# Patient Record
Sex: FEMALE | Race: WHITE | NOT HISPANIC OR LATINO | Employment: UNEMPLOYED | URBAN - METROPOLITAN AREA
[De-identification: names, ages, dates, MRNs, and addresses within clinical notes are randomized per-mention and may not be internally consistent; named-entity substitution may affect disease eponyms.]

---

## 2022-04-11 ENCOUNTER — HISTORICAL (OUTPATIENT)
Dept: ADMINISTRATIVE | Facility: HOSPITAL | Age: 62
End: 2022-04-11
Payer: COMMERCIAL

## 2022-04-27 VITALS
DIASTOLIC BLOOD PRESSURE: 72 MMHG | BODY MASS INDEX: 37.65 KG/M2 | WEIGHT: 212.5 LBS | SYSTOLIC BLOOD PRESSURE: 110 MMHG | OXYGEN SATURATION: 98 % | HEIGHT: 63 IN

## 2022-06-17 ENCOUNTER — CLINICAL SUPPORT (OUTPATIENT)
Dept: INTERNAL MEDICINE | Facility: CLINIC | Age: 62
End: 2022-06-17
Payer: COMMERCIAL

## 2022-06-17 VITALS — TEMPERATURE: 98 F

## 2022-06-17 DIAGNOSIS — Z23 VACCINE FOR VZV (VARICELLA-ZOSTER VIRUS): Primary | ICD-10-CM

## 2022-06-17 DIAGNOSIS — Z23 NEED FOR VACCINATION: Primary | ICD-10-CM

## 2022-06-17 PROCEDURE — 99211 OFF/OP EST MAY X REQ PHY/QHP: CPT | Mod: PBBFAC

## 2022-06-17 PROCEDURE — 90750 HZV VACC RECOMBINANT IM: CPT | Mod: PBBFAC

## 2022-06-17 NOTE — PROGRESS NOTES
[place] [facility] [place] Shingrix Vaccine  Shingrix  Powder(NDC 20767-762-15,  Lot # [id number], Expires 08/31/2023. MFTD by: Unocoin)  reconstituted with one vial of adujvant suspension component .  NDC 34949-808-21, Lot # [id number], expires : 08/31/2023 . 0.5 ml administered IM to left deltoid using aseptic technique. Patient reviewed and give copy of VIS statement. Patient tolerated procedure well with no adverse reactions noted. Patient will take second dose of vaccine at next apointment with provider . (09/ 23/2022 at 0715 am.)

## 2022-09-23 ENCOUNTER — OFFICE VISIT (OUTPATIENT)
Dept: INTERNAL MEDICINE | Facility: CLINIC | Age: 62
End: 2022-09-23
Payer: COMMERCIAL

## 2022-09-23 VITALS
WEIGHT: 201 LBS | DIASTOLIC BLOOD PRESSURE: 80 MMHG | RESPIRATION RATE: 20 BRPM | TEMPERATURE: 98 F | SYSTOLIC BLOOD PRESSURE: 120 MMHG | HEIGHT: 63 IN | HEART RATE: 80 BPM | BODY MASS INDEX: 35.61 KG/M2

## 2022-09-23 DIAGNOSIS — L40.50 PSORIATIC ARTHRITIS: ICD-10-CM

## 2022-09-23 DIAGNOSIS — E78.2 MIXED HYPERLIPIDEMIA: ICD-10-CM

## 2022-09-23 DIAGNOSIS — Z23 NEED FOR VACCINATION: Primary | ICD-10-CM

## 2022-09-23 DIAGNOSIS — Z00.00 WELLNESS EXAMINATION: ICD-10-CM

## 2022-09-23 DIAGNOSIS — Z12.39 ENCOUNTER FOR SCREENING FOR MALIGNANT NEOPLASM OF BREAST, UNSPECIFIED SCREENING MODALITY: ICD-10-CM

## 2022-09-23 PROBLEM — F17.200 TOBACCO DEPENDENCE: Status: ACTIVE | Noted: 2022-09-23

## 2022-09-23 PROCEDURE — 3008F PR BODY MASS INDEX (BMI) DOCUMENTED: ICD-10-PCS | Mod: CPTII,S$PBB,, | Performed by: NURSE PRACTITIONER

## 2022-09-23 PROCEDURE — 3074F PR MOST RECENT SYSTOLIC BLOOD PRESSURE < 130 MM HG: ICD-10-PCS | Mod: CPTII,S$PBB,, | Performed by: NURSE PRACTITIONER

## 2022-09-23 PROCEDURE — 3074F SYST BP LT 130 MM HG: CPT | Mod: CPTII,S$PBB,, | Performed by: NURSE PRACTITIONER

## 2022-09-23 PROCEDURE — 1160F PR REVIEW ALL MEDS BY PRESCRIBER/CLIN PHARMACIST DOCUMENTED: ICD-10-PCS | Mod: CPTII,S$PBB,, | Performed by: NURSE PRACTITIONER

## 2022-09-23 PROCEDURE — 90471 IMMUNIZATION ADMIN: CPT | Mod: PBBFAC | Performed by: NURSE PRACTITIONER

## 2022-09-23 PROCEDURE — 1159F PR MEDICATION LIST DOCUMENTED IN MEDICAL RECORD: ICD-10-PCS | Mod: CPTII,S$PBB,, | Performed by: NURSE PRACTITIONER

## 2022-09-23 PROCEDURE — 3079F PR MOST RECENT DIASTOLIC BLOOD PRESSURE 80-89 MM HG: ICD-10-PCS | Mod: CPTII,S$PBB,, | Performed by: NURSE PRACTITIONER

## 2022-09-23 PROCEDURE — 90686 IIV4 VACC NO PRSV 0.5 ML IM: CPT | Mod: PBBFAC | Performed by: NURSE PRACTITIONER

## 2022-09-23 PROCEDURE — 99213 PR OFFICE/OUTPT VISIT, EST, LEVL III, 20-29 MIN: ICD-10-PCS | Mod: S$PBB,,, | Performed by: NURSE PRACTITIONER

## 2022-09-23 PROCEDURE — 3079F DIAST BP 80-89 MM HG: CPT | Mod: CPTII,S$PBB,, | Performed by: NURSE PRACTITIONER

## 2022-09-23 PROCEDURE — 1160F RVW MEDS BY RX/DR IN RCRD: CPT | Mod: CPTII,S$PBB,, | Performed by: NURSE PRACTITIONER

## 2022-09-23 PROCEDURE — 3008F BODY MASS INDEX DOCD: CPT | Mod: CPTII,S$PBB,, | Performed by: NURSE PRACTITIONER

## 2022-09-23 PROCEDURE — 1159F MED LIST DOCD IN RCRD: CPT | Mod: CPTII,S$PBB,, | Performed by: NURSE PRACTITIONER

## 2022-09-23 PROCEDURE — 90472 IMMUNIZATION ADMIN EACH ADD: CPT | Mod: PBBFAC | Performed by: NURSE PRACTITIONER

## 2022-09-23 PROCEDURE — 90472 IMMUNIZATION ADMIN EACH ADD: CPT | Mod: PBBFAC

## 2022-09-23 PROCEDURE — 99215 OFFICE O/P EST HI 40 MIN: CPT | Mod: PBBFAC | Performed by: NURSE PRACTITIONER

## 2022-09-23 PROCEDURE — 99213 OFFICE O/P EST LOW 20 MIN: CPT | Mod: S$PBB,,, | Performed by: NURSE PRACTITIONER

## 2022-09-23 PROCEDURE — 90686 IIV4 VACC NO PRSV 0.5 ML IM: CPT | Mod: PBBFAC

## 2022-09-23 PROCEDURE — 90750 HZV VACC RECOMBINANT IM: CPT | Mod: PBBFAC | Performed by: NURSE PRACTITIONER

## 2022-09-23 RX ORDER — OMEGA-3-ACID ETHYL ESTERS 1 G/1
2 CAPSULE, LIQUID FILLED ORAL 2 TIMES DAILY
COMMUNITY

## 2022-09-23 NOTE — PROGRESS NOTES
ANGI Bruce   [facility]  [address] [facility] [address]      PATIENT NAME: Alma Rosa Disla  : 1960  DATE: 22  MRN: [id number]      Billing Provider: ANGI Bruce  Level of Service: VA OFFICE/OUTPT VISIT, EST, LEVL III, 20-29 MIN  Patient PCP Information       Provider PCP Type    ANGI Bruce General            Reason for Visit / Chief Complaint: Hyperlipidemia (Fasting, right elbow/knee pain, wants flu and 2nd shingrix )       History of Present Illness / Problem Focused Workflow     Alma Rosa Disla is a 62 y.o. White female presents to the clinic for HLD f/u. PMH obesity, and psoriatic arthritis (formerly seeing Dr. Baum). Followed by Dr. Feli Aleman, gyn. Continues to follow dash diet. Reports generalized joint and muscle pain daily with flare right elbow and right knee; stiffness lasting  mins. Used to have problems mostly during winter months but attributes current joint pain on age and weight. Previously seeing Rheumatology, stated was on humira at last visit; today reports was on remicade in the past with significant improvement. Last rheum visit approx 15 yrs. States has been drinking Almased powder in almond milk with some improvement. Had used about 7 yrs ago and was down to 140 lbs. Amenable to workup and referral today. Amendable to flu and shingrix #2 today. Reports taking female comfort (15 yrs) and thyroid support (2 yrs) supplements OTC. Denies chest pain, shortness of breath, cough, fever, headache, dizziness, weakness, abdominal pain, nausea, vomiting, diarrhea, constipation, dysuria, depression, anxiety, SI/HI.    Review of Systems     Review of Systems   Constitutional:  Negative for activity change and unexpected weight change.   HENT:  Negative for hearing loss, rhinorrhea and trouble swallowing.    Eyes:  Negative for discharge and visual disturbance.  "  Respiratory:  Negative for chest tightness and wheezing.    Cardiovascular:  Negative for chest pain and palpitations.   Gastrointestinal:  Negative for blood in stool, constipation, diarrhea and vomiting.   Endocrine: Negative for polydipsia and polyuria.   Genitourinary:  Negative for difficulty urinating, dysuria, hematuria and menstrual problem.   Musculoskeletal:  Positive for arthralgias and joint swelling. Negative for neck pain.   Neurological:  Negative for weakness and headaches.   Psychiatric/Behavioral:  Negative for dysphoric mood.       Medical / Social / Family History     Past Medical History:   Diagnosis Date    Hyperlipidemia        Past Surgical History:   Procedure Laterality Date    ADENOIDECTOMY      CHOLECYSTECTOMY      COLONOSCOPY      with biopsy    TONSILLECTOMY         Social History  Ms. Disla reports that she has never smoked. She has never used smokeless tobacco. She reports current alcohol use. She reports that she does not use drugs.    Family History  's family history includes Alzheimer's disease in her mother; Cancer in her father and sister; Diabetes in her brother and mother; Epilepsy in her brother; Heart disease in her mother.    Medications and Allergies     Medications  Medication List with Changes/Refills   Current Medications    CHOLECALCIFEROL, VITAMIN D3, 325 MCG (13,000 UNIT) CAP CAPSULE    Take by mouth 2 (two) times a day.    MULTIVITAMIN CAPSULE    Take 1 capsule by mouth once daily.    OMEGA-3 ACID ETHYL ESTERS (LOVAZA) 1 GRAM CAPSULE    Take 2 g by mouth 2 (two) times daily.         Allergies  Review of patient's allergies indicates:  No Known Allergies    Physical Examination   Vital Signs  Temp: 98.1 °F (36.7 °C)  Temp src: Oral  Pulse: 80  Resp: 20  BP: 120/80  BP Location: Left arm  Patient Position: Sitting  Pain Score:   4  Pain Loc: Elbow  Height and Weight  Height: 5' 3.39" (161 cm)  Weight: 91.2 kg (201 lb)  BSA (Calculated - sq m): 2.02 sq " meters  BMI (Calculated): 35.2  Weight in (lb) to have BMI = 25: 142.6]   Physical Exam  Vitals reviewed.   Constitutional:       Appearance: Normal appearance. She is obese.   HENT:      Head: Normocephalic.   Eyes:      Pupils: Pupils are equal, round, and reactive to light.   Cardiovascular:      Rate and Rhythm: Normal rate and regular rhythm.      Heart sounds: Normal heart sounds.   Pulmonary:      Effort: Pulmonary effort is normal.      Breath sounds: Normal breath sounds.   Abdominal:      General: Bowel sounds are normal.      Palpations: Abdomen is soft.   Musculoskeletal:      Right elbow: Decreased range of motion. Tenderness present in lateral epicondyle.      Right knee: Crepitus present. Decreased range of motion. Tenderness present over the patellar tendon.   Skin:     General: Skin is warm.   Neurological:      Mental Status: She is alert and oriented to person, place, and time.   Psychiatric:         Mood and Affect: Mood normal.         Speech: Speech normal.         Behavior: Behavior is cooperative.         Judgment: Judgment normal.         Results     Most recent labs on 3/25/22 in [facility] reviewed.    Assessment and Plan (including Health Maintenance)     Plan: Virtual visit in 4-6 weeks to discuss lab results.         Health Maintenance Due   Topic Date Due    Hepatitis C Screening  Never done    HIV Screening  Never done    TETANUS VACCINE  Never done    Mammogram  09/28/2021    COVID-19 Vaccine (3 - Booster for Pfizer series) 10/02/2021       Problem List Items Addressed This Visit          Cardiac/Vascular    Mixed hyperlipidemia    Current Assessment & Plan     FLP ordered.  Follow a low cholesterol, low saturated fat diet with less than 200 mg of cholesterol a day.   Avoid fried foods and high saturated fats (cameron, sausage, cookies, cakes, chips, cheese, whole milk, butter, mayonnaise, creamy dressings, gravy and cream sauces).  Add flax seed or fish oil supplements to diet.   Increase  dietary fiber.   Regular exercise improves cholesterol levels.  Physical activity 5 times a week for 30 minutes per day (or 150 minutes per week).   Stressed importance of dietary modifications.              ID    Need for vaccination - Primary    Current Assessment & Plan     Flu and shingles vaccines given.         Relevant Orders    Influenza - Quadrivalent (PF) (Completed)    Zoster Recombinant Vaccine (Completed)       Endocrine    BMI 35.0-35.9,adult    Current Assessment & Plan     BMI 35. Goal BMI <30.  Aerobic exercise 150 minutes per week.  Avoid soda, simple sugars, sweets, excessive rice, pasta, potatoes or bread.   Choose brown options when available and portion control.  Limit fast foods and fried foods.   Choose complex carbs in moderation (ex: green, leafy vegetables, beans, oatmeal).  Eat plenty of fresh fruits and vegetables with lean meats daily.   Consider permanent healthy lifestyle changes.              Orthopedic    Psoriatic arthritis    Current Assessment & Plan     Autoimmune workup ordered.  Will refer to rheumatology if indicated.          Relevant Orders    C-Reactive Protein (Completed)    Sedimentation rate (Completed)    RHEUMATOID ARTHRITIS PANEL    Anti-Smith Antibody    Uric Acid (Completed)     Other Visit Diagnoses       Encounter for screening for malignant neoplasm of breast, unspecified screening modality        Relevant Orders    Mammo Digital Screening Bilat    Wellness examination        Relevant Orders    Ambulatory referral/consult to Gynecology            Health Maintenance Topics with due status: Not Due       Topic Last Completion Date    Colorectal Cancer Screening 04/13/2018    Cervical Cancer Screening 07/29/2020    Lipid Panel 07/31/2020       Future Appointments   Date Time Provider Department Center   10/26/2022  1:15 PM ANGI Banda [facility]une[facility]            Signature:  ANGI Bruce  [facility]  [address] [facility] [address]    Date of encounter: 9/23/22    Answers submitted by the patient for this visit:  Review of Systems Questionnaire (Submitted on 9/16/2022)  activity change: No  unexpected weight change: No  neck pain: No  hearing loss: No  rhinorrhea: No  trouble swallowing: No  eye discharge: No  visual disturbance: No  chest tightness: No  wheezing: No  chest pain: No  palpitations: No  blood in stool: No  constipation: No  vomiting: No  diarrhea: No  polydipsia: No  polyuria: No  difficulty urinating: No  hematuria: No  menstrual problem: No  dysuria: No  joint swelling: Yes  arthralgias: Yes  headaches: No  weakness: No  dysphoric mood: No

## 2022-09-26 ENCOUNTER — LAB VISIT (OUTPATIENT)
Dept: LAB | Facility: HOSPITAL | Age: 62
End: 2022-09-26
Attending: NURSE PRACTITIONER
Payer: COMMERCIAL

## 2022-09-26 DIAGNOSIS — L40.50 PSORIATIC ARTHRITIS: ICD-10-CM

## 2022-09-26 LAB
CRP SERPL-MCNC: 12.8 MG/L
ERYTHROCYTE [SEDIMENTATION RATE] IN BLOOD: 49 MM/HR (ref 0–20)
URATE SERPL-MCNC: 5.6 MG/DL (ref 2.6–6)

## 2022-09-26 PROCEDURE — 86140 C-REACTIVE PROTEIN: CPT

## 2022-09-26 PROCEDURE — 86235 NUCLEAR ANTIGEN ANTIBODY: CPT

## 2022-09-26 PROCEDURE — 84550 ASSAY OF BLOOD/URIC ACID: CPT

## 2022-09-26 PROCEDURE — 36415 COLL VENOUS BLD VENIPUNCTURE: CPT

## 2022-09-26 PROCEDURE — 85651 RBC SED RATE NONAUTOMATED: CPT

## 2022-09-26 PROCEDURE — 86431 RHEUMATOID FACTOR QUANT: CPT

## 2022-09-26 PROCEDURE — 86200 CCP ANTIBODY: CPT

## 2022-09-27 LAB — ENA SM IGG SER-ACNC: <0.2 U

## 2022-09-27 NOTE — ASSESSMENT & PLAN NOTE
FLP ordered.  Follow a low cholesterol, low saturated fat diet with less than 200 mg of cholesterol a day.   Avoid fried foods and high saturated fats (cameron, sausage, cookies, cakes, chips, cheese, whole milk, butter, mayonnaise, creamy dressings, gravy and cream sauces).  Add flax seed or fish oil supplements to diet.   Increase dietary fiber.   Regular exercise improves cholesterol levels.  Physical activity 5 times a week for 30 minutes per day (or 150 minutes per week).   Stressed importance of dietary modifications.

## 2022-09-27 NOTE — ASSESSMENT & PLAN NOTE
BMI 35. Goal BMI <30.  Aerobic exercise 150 minutes per week.  Avoid soda, simple sugars, sweets, excessive rice, pasta, potatoes or bread.   Choose brown options when available and portion control.  Limit fast foods and fried foods.   Choose complex carbs in moderation (ex: green, leafy vegetables, beans, oatmeal).  Eat plenty of fresh fruits and vegetables with lean meats daily.   Consider permanent healthy lifestyle changes.

## 2022-09-29 LAB
CYCLIC CITRULLINATED PEPTIDE (CCP) (OLG): NEGATIVE
RHEUMATOID FACTOR IGA (OLG): NEGATIVE
RHEUMATOID FACTOR IGM (OLG): NEGATIVE

## 2022-11-06 DIAGNOSIS — N64.89 BREAST ASYMMETRY: Primary | ICD-10-CM

## 2022-11-07 NOTE — PROGRESS NOTES
Orders placed for diagnostic left MMG and left breast US per recommendations. Please fax to [facility] for scheduling. Thanks

## 2022-12-07 LAB — BCS RECOMMENDATION EXT: NORMAL

## 2023-01-31 ENCOUNTER — DOCUMENTATION ONLY (OUTPATIENT)
Dept: INTERNAL MEDICINE | Facility: CLINIC | Age: 63
End: 2023-01-31
Payer: COMMERCIAL

## 2023-07-13 ENCOUNTER — OFFICE VISIT (OUTPATIENT)
Dept: URGENT CARE | Facility: CLINIC | Age: 63
End: 2023-07-13
Payer: COMMERCIAL

## 2023-07-13 VITALS
RESPIRATION RATE: 20 BRPM | BODY MASS INDEX: 35.79 KG/M2 | HEART RATE: 85 BPM | DIASTOLIC BLOOD PRESSURE: 82 MMHG | HEIGHT: 63 IN | OXYGEN SATURATION: 98 % | SYSTOLIC BLOOD PRESSURE: 138 MMHG | TEMPERATURE: 98 F | WEIGHT: 202 LBS

## 2023-07-13 DIAGNOSIS — R39.9 SYMPTOMS OF URINARY TRACT INFECTION: Primary | ICD-10-CM

## 2023-07-13 DIAGNOSIS — N39.0 URINARY TRACT INFECTION WITH HEMATURIA, SITE UNSPECIFIED: ICD-10-CM

## 2023-07-13 DIAGNOSIS — R31.9 URINARY TRACT INFECTION WITH HEMATURIA, SITE UNSPECIFIED: ICD-10-CM

## 2023-07-13 LAB
APPEARANCE UR: CLEAR
BACTERIA #/AREA URNS AUTO: ABNORMAL /HPF
BILIRUB UR QL STRIP.AUTO: NEGATIVE
BILIRUB UR QL STRIP: NEGATIVE
COLOR UR: YELLOW
GLUCOSE UR QL STRIP.AUTO: NORMAL
GLUCOSE UR QL STRIP: NEGATIVE
HYALINE CASTS #/AREA URNS LPF: ABNORMAL /LPF
KETONES UR QL STRIP.AUTO: NEGATIVE
KETONES UR QL STRIP: NEGATIVE
LEUKOCYTE ESTERASE UR QL STRIP.AUTO: 250
LEUKOCYTE ESTERASE UR QL STRIP: POSITIVE
MUCOUS THREADS URNS QL MICRO: ABNORMAL /LPF
NITRITE UR QL STRIP.AUTO: NEGATIVE
PH UR STRIP.AUTO: 5.5 [PH]
PH, POC UA: 6
POC BLOOD, URINE: POSITIVE
POC NITRATES, URINE: NEGATIVE
PROT UR QL STRIP.AUTO: NEGATIVE
PROT UR QL STRIP: NEGATIVE
RBC #/AREA URNS AUTO: ABNORMAL /HPF
RBC UR QL AUTO: NEGATIVE
SP GR UR STRIP.AUTO: 1.01
SP GR UR STRIP: 1.01 (ref 1–1.03)
SQUAMOUS #/AREA URNS LPF: ABNORMAL /HPF
UROBILINOGEN UR STRIP-ACNC: 0.2 (ref 0.1–1.1)
UROBILINOGEN UR STRIP-ACNC: NORMAL
WBC #/AREA URNS AUTO: ABNORMAL /HPF

## 2023-07-13 PROCEDURE — 99214 OFFICE O/P EST MOD 30 MIN: CPT | Mod: PBBFAC | Performed by: FAMILY MEDICINE

## 2023-07-13 PROCEDURE — 99214 OFFICE O/P EST MOD 30 MIN: CPT | Mod: S$PBB,,, | Performed by: FAMILY MEDICINE

## 2023-07-13 PROCEDURE — 99214 PR OFFICE/OUTPT VISIT, EST, LEVL IV, 30-39 MIN: ICD-10-PCS | Mod: S$PBB,,, | Performed by: FAMILY MEDICINE

## 2023-07-13 PROCEDURE — 87088 URINE BACTERIA CULTURE: CPT | Performed by: FAMILY MEDICINE

## 2023-07-13 PROCEDURE — 81001 URINALYSIS AUTO W/SCOPE: CPT | Performed by: FAMILY MEDICINE

## 2023-07-13 PROCEDURE — 81003 URINALYSIS AUTO W/O SCOPE: CPT | Mod: PBBFAC,91 | Performed by: FAMILY MEDICINE

## 2023-07-13 RX ORDER — NITROFURANTOIN 25; 75 MG/1; MG/1
100 CAPSULE ORAL 2 TIMES DAILY
Qty: 14 CAPSULE | Refills: 0 | Status: SHIPPED | OUTPATIENT
Start: 2023-07-13 | End: 2023-07-24 | Stop reason: ALTCHOICE

## 2023-07-13 NOTE — PROGRESS NOTES
"Subjective:       Patient ID: Alma Rosa Disla is a 63 y.o. female.    Chief Complaint: Urinary Tract Infection (Symptoms x 1 week. Flank pain, dysuria and urgency)      Urinary Tract Infection     63-year-old female with 1 week of flank pain, dysuria, and increased urinary urgency.  Over-the-counter medications have been minimally effective.  Review of Systems   Genitourinary:         As above       Objective:       Vital Signs  Temp: 98.1 °F (36.7 °C)  Temp Source: Oral  Pulse: 85  Resp: 20  SpO2: 98 %  BP: 138/82  Pain Score:   7  Pain Loc: Back  Height and Weight  Height: 5' 3" (160 cm)  Weight: 91.6 kg (202 lb)  BSA (Calculated - sq m): 2.02 sq meters  BMI (Calculated): 35.8  Weight in (lb) to have BMI = 25: 140.8]  Physical Exam  Constitutional:       Appearance: Normal appearance.   HENT:      Head: Normocephalic and atraumatic.   Eyes:      Extraocular Movements: Extraocular movements intact.      Conjunctiva/sclera: Conjunctivae normal.   Cardiovascular:      Rate and Rhythm: Normal rate and regular rhythm.      Heart sounds: Normal heart sounds.   Pulmonary:      Breath sounds: Normal breath sounds.   Skin:     General: Skin is warm and dry.   Neurological:      General: No focal deficit present.      Mental Status: She is alert.   Psychiatric:         Mood and Affect: Mood and affect normal.         Speech: Speech normal.         Behavior: Behavior normal. Behavior is cooperative.         Thought Content: Thought content does not include homicidal or suicidal ideation.       Assessment:       Problem List Items Addressed This Visit    None  Visit Diagnoses       Symptoms of urinary tract infection    -  Primary    Relevant Orders    POCT Urinalysis, Dipstick, Automated, W/O Scope (Completed)    Urinary tract infection with hematuria, site unspecified        Relevant Medications    nitrofurantoin, macrocrystal-monohydrate, (MACROBID) 100 MG capsule    Other Relevant Orders    Urinalysis, Reflex to " Urine Culture            Plan:    Urine culture pending  Encouraged increased fluid intake  ER precautions  FU with PCP

## 2023-07-16 LAB — BACTERIA UR CULT: NORMAL

## 2023-07-24 ENCOUNTER — OFFICE VISIT (OUTPATIENT)
Dept: INTERNAL MEDICINE | Facility: CLINIC | Age: 63
End: 2023-07-24
Payer: COMMERCIAL

## 2023-07-24 VITALS
RESPIRATION RATE: 18 BRPM | BODY MASS INDEX: 35.79 KG/M2 | TEMPERATURE: 98 F | DIASTOLIC BLOOD PRESSURE: 67 MMHG | WEIGHT: 202 LBS | HEIGHT: 63 IN | HEART RATE: 65 BPM | SYSTOLIC BLOOD PRESSURE: 102 MMHG

## 2023-07-24 DIAGNOSIS — L40.50 PSORIATIC ARTHRITIS: Chronic | ICD-10-CM

## 2023-07-24 DIAGNOSIS — N30.90 CYSTITIS: ICD-10-CM

## 2023-07-24 DIAGNOSIS — E78.2 MIXED HYPERLIPIDEMIA: Primary | Chronic | ICD-10-CM

## 2023-07-24 DIAGNOSIS — Z11.3 ROUTINE SCREENING FOR STI (SEXUALLY TRANSMITTED INFECTION): ICD-10-CM

## 2023-07-24 PROBLEM — F17.200 TOBACCO DEPENDENCE: Chronic | Status: ACTIVE | Noted: 2022-09-23

## 2023-07-24 PROBLEM — F17.200 TOBACCO DEPENDENCE: Status: RESOLVED | Noted: 2022-09-23 | Resolved: 2023-07-24

## 2023-07-24 PROCEDURE — 99214 PR OFFICE/OUTPT VISIT, EST, LEVL IV, 30-39 MIN: ICD-10-PCS | Mod: S$PBB,,, | Performed by: NURSE PRACTITIONER

## 2023-07-24 PROCEDURE — 1160F RVW MEDS BY RX/DR IN RCRD: CPT | Mod: CPTII,,, | Performed by: NURSE PRACTITIONER

## 2023-07-24 PROCEDURE — 3008F BODY MASS INDEX DOCD: CPT | Mod: CPTII,,, | Performed by: NURSE PRACTITIONER

## 2023-07-24 PROCEDURE — 3008F PR BODY MASS INDEX (BMI) DOCUMENTED: ICD-10-PCS | Mod: CPTII,,, | Performed by: NURSE PRACTITIONER

## 2023-07-24 PROCEDURE — 3074F PR MOST RECENT SYSTOLIC BLOOD PRESSURE < 130 MM HG: ICD-10-PCS | Mod: CPTII,,, | Performed by: NURSE PRACTITIONER

## 2023-07-24 PROCEDURE — 1160F PR REVIEW ALL MEDS BY PRESCRIBER/CLIN PHARMACIST DOCUMENTED: ICD-10-PCS | Mod: CPTII,,, | Performed by: NURSE PRACTITIONER

## 2023-07-24 PROCEDURE — 99214 OFFICE O/P EST MOD 30 MIN: CPT | Mod: PBBFAC | Performed by: NURSE PRACTITIONER

## 2023-07-24 PROCEDURE — 1159F MED LIST DOCD IN RCRD: CPT | Mod: CPTII,,, | Performed by: NURSE PRACTITIONER

## 2023-07-24 PROCEDURE — 3078F PR MOST RECENT DIASTOLIC BLOOD PRESSURE < 80 MM HG: ICD-10-PCS | Mod: CPTII,,, | Performed by: NURSE PRACTITIONER

## 2023-07-24 PROCEDURE — 3074F SYST BP LT 130 MM HG: CPT | Mod: CPTII,,, | Performed by: NURSE PRACTITIONER

## 2023-07-24 PROCEDURE — 99214 OFFICE O/P EST MOD 30 MIN: CPT | Mod: S$PBB,,, | Performed by: NURSE PRACTITIONER

## 2023-07-24 PROCEDURE — 1159F PR MEDICATION LIST DOCUMENTED IN MEDICAL RECORD: ICD-10-PCS | Mod: CPTII,,, | Performed by: NURSE PRACTITIONER

## 2023-07-24 PROCEDURE — 3078F DIAST BP <80 MM HG: CPT | Mod: CPTII,,, | Performed by: NURSE PRACTITIONER

## 2023-07-24 NOTE — PROGRESS NOTES
Cindy Palacios, ANGI   [facility]  [address] [facility] [address]      PATIENT NAME: Alma Rosa Disla  : 1960  DATE: 23  MRN: [id number]      Reason for Visit / Chief Complaint: Follow-up (Fasting, completed ABT, c/o foul odor upon urination, refused TDAP)       History of Present Illness / Problem Focused Workflow     Alma Rosa Disla is a 63 y.o. White female presents to the clinic for [facility] f/u. PMH obesity,  and psoriatic arthritis (formerly seeing Dr. Baum). Followed by Dr. Cornelio Blackwood, GYN.    Pt reported to [facility] on 23 with c/o dysuria and was prescribed macrobid. Pt completed as prescribed and symptoms have resolved except for reported odor. Did not complete labs as ordered during last visit; will r/o today as pt is fasting. Received order for MMG in October from GYN. Declines tetanus vaccine today. Denies chest pain, shortness of breath, cough, fever, headache, dizziness, weakness, abdominal pain, nausea, vomiting, diarrhea, constipation, dysuria, depression, anxiety, SI/HI.    Review of Systems     Review of Systems     See HPI for details    Constitutional: Denies Change in appetite. Denies Chills. Denies Fever. Denies Night sweats.  Eye: Denies Blurred vision. Denies Discharge. Denies Eye pain.  ENT: Denies Decreased hearing. Denies Sore throat. Denies Swollen glands.  Respiratory: Denies Cough. Denies Shortness of breath. Denies Shortness of breath with exertion. Denies Wheezing.  Cardiovascular: Denies Chest pain at rest. Denies Chest pain with exertion. Denies Irregular heartbeat. Denies Palpitations. Denies Edema.  Gastrointestinal: Denies Abdominal pain. Denies Diarrhea. Denies Nausea. Denies Vomiting. Denies Hematemesis or Hematochezia.  Genitourinary: Denies Dysuria. Denies Urinary frequency. Denies Urinary urgency. Denies Blood in urine.  Endocrine: Denies Cold intolerance. Denies Excessive thirst.  Denies Heat intolerance. Denies Weight loss. Denies Weight gain.  Musculoskeletal: Admits Painful joints. Denies Weakness.  Integumentary: Denies Rash. Denies Itching. Denies Dry skin.  Neurologic: Denies Dizziness. Denies Fainting. Denies Headache.  Psychiatric: Denies Depression. Denies Anxiety. Denies Suicidal/Homicidal ideations.    All Other ROS: Negative except as stated in HPI.     Medical / Surgical / Social / Family History       ----------------------------  Hyperlipidemia  Menopause     Past Surgical History:   Procedure Laterality Date    ADENOIDECTOMY      CHOLECYSTECTOMY      COLONOSCOPY      with biopsy    TONSILLECTOMY         Social History     Socioeconomic History    Marital status:    Tobacco Use    Smoking status: Never    Smokeless tobacco: Never   Substance and Sexual Activity    Alcohol use: Not Currently     Comment: I ONLY WINE MAYBE TWICE year    Drug use: Never    Sexual activity: Not Currently     Partners: Male     Social Determinants of Health     Transportation Needs: No Transportation Needs    Lack of Transportation (Medical): No    Lack of Transportation (Non-Medical): No   Housing Stability: Low Risk     Unable to Pay for Housing in the Last Year: No    Number of Places Lived in the Last Year: 1    Unstable Housing in the Last Year: No        Family History   Problem Relation Age of Onset    Heart disease Mother     Diabetes Mother     Alzheimer's disease Mother     Stroke Mother     Cancer Father         lung    Heart failure Father     Cancer Sister         ovarian    Diabetes Sister     Rashes / Skin problems Sister     Diabetes Brother     Epilepsy Brother     Heart failure Maternal Grandmother     Osteoarthritis Maternal Aunt     Cancer Sister     Diabetes Sister     Ovarian cancer Sister     Diabetes Brother     Rashes / Skin problems Brother     Seizures Brother     Stroke Brother         Medications and Allergies     Medications  Current Outpatient Medications  "  Medication Instructions    cholecalciferol, vitamin D3, 325 mcg (13,000 unit) Cap capsule Oral, 2 times daily    herbal drugs Tab Oral, Over the counter herbal supplement    multivitamin capsule 1 capsule, Oral, Daily    omega-3 acid ethyl esters (LOVAZA) 2 g, Oral, 2 times daily    THYROID ORAL Oral, Over the counter herbal supplement         Allergies  Review of patient's allergies indicates:  No Known Allergies    Physical Examination     /67 (BP Location: Right arm, Patient Position: Sitting, BP Method: Large (Automatic))   Pulse 65   Temp 98.4 °F (36.9 °C) (Oral)   Resp 18   Ht 5' 2.99" (1.6 m)   Wt 91.6 kg (202 lb)   BMI 35.79 kg/m²     Physical Exam  Constitutional:       Appearance: She is obese.   Musculoskeletal:      Right hand: Swelling, deformity and tenderness present. Decreased strength.      Left hand: Swelling, deformity and tenderness present. Decreased strength.      Comments: + joint deformity to fingers of bilateral hands and dactylitis.       General: Alert and oriented, No acute distress.  Head: Normocephalic, Atraumatic.  Eye: Pupils are equal, round and reactive to light, Extraocular movements are intact, Sclera non-icteric.  Ears/Nose/Throat: Normal, Mucosa moist,Clear.  Neck/Thyroid: Supple, Non-tender, No carotid bruit, No lymphadenopathy, No JVD, Full range of motion.  Respiratory: Clear to auscultation bilaterally; No wheezes, rales or rhonchi,Non-labored respirations, Symmetrical chest wall expansion.  Cardiovascular: Regular rate and rhythm, S1/S2 normal, No murmurs, rubs or gallops.  Gastrointestinal: Soft, Non-tender, Non-distended, Normal bowel sounds, No palpable organomegaly.  Integumentary: Warm, Dry, Intact, No suspicious lesions or rashes.  Extremities: No clubbing, cyanosis   Neurologic: No focal deficits, Cranial Nerves II-XII are grossly intact, Motor strength normal upper and lower extremities, Sensory exam intact.  Psychiatric: Normal interaction, Coherent " speech, Appropriate affect       Results     No results found for: WBC, HGB, HCT, PLT, CHOL, TRIG, LDLDIRECT, ALT, AST, NA, K, CL, CREATININE, BUN, CO2, TSH, PSA, INR, GLUF, HGBA1C, MICROALBUR      Assessment and Plan (including Health Maintenance)     Plan:     1. Cystitis  Completed macrobid as prescribed.  UA ordered to test for cure.  Report any continuing signs such as nausea/vomiting, visible blood in urine, increased low back or flank pain, worsening burning upon urination after antibiotic completion or fever.  Drink plenty of water.  Avoid soda or carbonated beverages.   Urinate frequently; do not hold urine for extended periods of time.  Women: wipe front to back, urinate after sexual intercourse, and avoid scented or irritating feminine products, bubble baths, body washes, bath bombs, and soaps.    - Urinalysis, Reflex to Urine Culture; Future    2. Mixed hyperlipidemia  FLP ordered.  Follow a low cholesterol, low saturated fat diet with less than 200 mg of cholesterol a day.   Avoid fried foods and high saturated fats (cameron, sausage, cookies, cakes, chips, cheese, whole milk, butter, mayonnaise, creamy dressings, gravy and cream sauces).  Add flax seed or fish oil supplements to diet.   Increase dietary fiber.   Regular exercise improves cholesterol levels.  Physical activity 5 times a week for 30 minutes per day (or 150 minutes per week).   Stressed importance of dietary modifications.      3. BMI 35.0-35.9,adult  BMI 35. Goal BMI <30.  Aerobic exercise 150 minutes per week.  Avoid soda, simple sugars, sweets, excessive rice, pasta, potatoes or bread.   Choose brown options when available and portion control.  Limit fast foods and fried foods.   Choose complex carbs in moderation (ex: green, leafy vegetables, beans, oatmeal).  Eat plenty of fresh fruits and vegetables with lean meats daily.   Consider permanent healthy lifestyle changes.    - CBC Auto Differential; Future  - Comprehensive Metabolic  Panel; Future  - Hemoglobin A1C; Future  - Lipid Panel; Future  - TSH; Future    4. Psoriatic arthritis  Labs reordered.  Will refer to rheumatology to re-establish care.  Previously saw Dr. Baum > 15 yrs and in humira and remicade in the past.  +joint deformity to hands and dactylitis.  Denies any skin plaques.  - C-Reactive Protein; Future  - Sedimentation rate; Future  - Rheumatoid Factors, IgA, IgG, IgM; Future  - CYCLIC CITRULLINATED PEPTIDE (CCP) ANTIBODY; Future  - Antinuclear Antibody (JENNIFER), HEp-2, IgG; Future  - Uric Acid; Future      Problem List Items Addressed This Visit          Cardiac/Vascular    Mixed hyperlipidemia - Primary (Chronic)       Renal/    Cystitis    Relevant Orders    Urinalysis, Reflex to Urine Culture       Endocrine    BMI 35.0-35.9,adult (Chronic)    Relevant Orders    CBC Auto Differential    Comprehensive Metabolic Panel    Hemoglobin A1C    Lipid Panel    TSH       Orthopedic    Psoriatic arthritis (Chronic)    Relevant Orders    C-Reactive Protein    Sedimentation rate    Rheumatoid Factors, IgA, IgG, IgM    CYCLIC CITRULLINATED PEPTIDE (CCP) ANTIBODY    Antinuclear Antibody (JENNIFER), HEp-2, IgG    Uric Acid     Other Visit Diagnoses       Routine screening for STI (sexually transmitted infection)        Relevant Orders    Hepatitis Panel, Acute    HIV 1/2 Ag/Ab (4th Gen)              Health Maintenance Due   Topic Date Due    Hepatitis C Screening  Never done    HIV Screening  Never done    TETANUS VACCINE  Never done    COVID-19 Vaccine (3 - Pfizer series) 10/02/2021    Hemoglobin A1c (Diabetic Prevention Screening)  07/31/2023       Follow up in about 4 weeks (around 8/21/2023) for Wellness with labs completed prior to appt. .        Signature:  ANGI Bruce  [facility]  [address] [facility] [id number]

## 2023-08-24 ENCOUNTER — OFFICE VISIT (OUTPATIENT)
Dept: INTERNAL MEDICINE | Facility: CLINIC | Age: 63
End: 2023-08-24
Payer: COMMERCIAL

## 2023-08-24 VITALS
BODY MASS INDEX: 36.75 KG/M2 | HEART RATE: 75 BPM | DIASTOLIC BLOOD PRESSURE: 75 MMHG | HEIGHT: 63 IN | WEIGHT: 207.38 LBS | TEMPERATURE: 98 F | SYSTOLIC BLOOD PRESSURE: 114 MMHG | RESPIRATION RATE: 20 BRPM

## 2023-08-24 DIAGNOSIS — Z78.0 POST-MENOPAUSAL: ICD-10-CM

## 2023-08-24 DIAGNOSIS — Z00.00 WELLNESS EXAMINATION: ICD-10-CM

## 2023-08-24 DIAGNOSIS — E66.09 CLASS 2 OBESITY DUE TO EXCESS CALORIES WITHOUT SERIOUS COMORBIDITY WITH BODY MASS INDEX (BMI) OF 36.0 TO 36.9 IN ADULT: Chronic | ICD-10-CM

## 2023-08-24 DIAGNOSIS — E78.2 MIXED HYPERLIPIDEMIA: Chronic | ICD-10-CM

## 2023-08-24 DIAGNOSIS — R76.8 POSITIVE ANA (ANTINUCLEAR ANTIBODY): Chronic | ICD-10-CM

## 2023-08-24 DIAGNOSIS — Z23 NEED FOR VACCINATION: Primary | ICD-10-CM

## 2023-08-24 DIAGNOSIS — Z12.11 ENCOUNTER FOR SCREENING FOR MALIGNANT NEOPLASM OF COLON: ICD-10-CM

## 2023-08-24 PROBLEM — E66.812 CLASS 2 OBESITY DUE TO EXCESS CALORIES WITHOUT SERIOUS COMORBIDITY WITH BODY MASS INDEX (BMI) OF 36.0 TO 36.9 IN ADULT: Chronic | Status: ACTIVE | Noted: 2023-08-24

## 2023-08-24 PROCEDURE — 1160F PR REVIEW ALL MEDS BY PRESCRIBER/CLIN PHARMACIST DOCUMENTED: ICD-10-PCS | Mod: CPTII,,, | Performed by: NURSE PRACTITIONER

## 2023-08-24 PROCEDURE — 90715 TDAP VACCINE 7 YRS/> IM: CPT | Mod: PBBFAC

## 2023-08-24 PROCEDURE — 3078F DIAST BP <80 MM HG: CPT | Mod: CPTII,,, | Performed by: NURSE PRACTITIONER

## 2023-08-24 PROCEDURE — 90471 IMMUNIZATION ADMIN: CPT | Mod: PBBFAC

## 2023-08-24 PROCEDURE — 3044F PR MOST RECENT HEMOGLOBIN A1C LEVEL <7.0%: ICD-10-PCS | Mod: CPTII,,, | Performed by: NURSE PRACTITIONER

## 2023-08-24 PROCEDURE — 3008F PR BODY MASS INDEX (BMI) DOCUMENTED: ICD-10-PCS | Mod: CPTII,,, | Performed by: NURSE PRACTITIONER

## 2023-08-24 PROCEDURE — 1159F MED LIST DOCD IN RCRD: CPT | Mod: CPTII,,, | Performed by: NURSE PRACTITIONER

## 2023-08-24 PROCEDURE — 99396 PREV VISIT EST AGE 40-64: CPT | Mod: S$PBB,,, | Performed by: NURSE PRACTITIONER

## 2023-08-24 PROCEDURE — 3074F SYST BP LT 130 MM HG: CPT | Mod: CPTII,,, | Performed by: NURSE PRACTITIONER

## 2023-08-24 PROCEDURE — 3044F HG A1C LEVEL LT 7.0%: CPT | Mod: CPTII,,, | Performed by: NURSE PRACTITIONER

## 2023-08-24 PROCEDURE — 3008F BODY MASS INDEX DOCD: CPT | Mod: CPTII,,, | Performed by: NURSE PRACTITIONER

## 2023-08-24 PROCEDURE — 3078F PR MOST RECENT DIASTOLIC BLOOD PRESSURE < 80 MM HG: ICD-10-PCS | Mod: CPTII,,, | Performed by: NURSE PRACTITIONER

## 2023-08-24 PROCEDURE — 1159F PR MEDICATION LIST DOCUMENTED IN MEDICAL RECORD: ICD-10-PCS | Mod: CPTII,,, | Performed by: NURSE PRACTITIONER

## 2023-08-24 PROCEDURE — 1160F RVW MEDS BY RX/DR IN RCRD: CPT | Mod: CPTII,,, | Performed by: NURSE PRACTITIONER

## 2023-08-24 PROCEDURE — 99214 OFFICE O/P EST MOD 30 MIN: CPT | Mod: PBBFAC | Performed by: NURSE PRACTITIONER

## 2023-08-24 PROCEDURE — 99212 OFFICE O/P EST SF 10 MIN: CPT | Mod: 25,S$PBB,, | Performed by: NURSE PRACTITIONER

## 2023-08-24 PROCEDURE — 99396 PR PREVENTIVE VISIT,EST,40-64: ICD-10-PCS | Mod: S$PBB,,, | Performed by: NURSE PRACTITIONER

## 2023-08-24 PROCEDURE — 3074F PR MOST RECENT SYSTOLIC BLOOD PRESSURE < 130 MM HG: ICD-10-PCS | Mod: CPTII,,, | Performed by: NURSE PRACTITIONER

## 2023-08-24 PROCEDURE — 99212 PR OFFICE/OUTPT VISIT, EST, LEVL II, 10-19 MIN: ICD-10-PCS | Mod: 25,S$PBB,, | Performed by: NURSE PRACTITIONER

## 2023-08-24 NOTE — PROGRESS NOTES
Cindy Palacios, ANGI   [facility]  [address] [facility] [address] [facility] [address]      PATIENT NAME: Alma Rosa Disla  : 1960  DATE: 23  MRN: [id number]      Reason for Visit / Chief Complaint: Hyperlipidemia (Lab results, wants TDAP)       History of Present Illness / Problem Focused Workflow     Alma Rosa Disla is a 63 y.o. White female presents to the clinic for annual wellness exam. PMH obesity,  and psoriatic arthritis (formerly seeing Dr. Baum). Followed by Dr. Cornelio Blackwood, GYN.    Continues to report joint pain and swelling, worse in the mornings but improves during the day and returns at night; continues to deny any plaque lesions. Labs reviewed with pt. Amendable to tetanus vaccine today. Denies chest pain, shortness of breath, cough, fever, headache, dizziness, weakness, abdominal pain, nausea, vomiting, diarrhea, constipation, dysuria, depression, anxiety, SI/HI.    Review of Systems     Review of Systems     See HPI for details    Constitutional: Denies Change in appetite. Denies Chills. Denies Fever. Denies Night sweats.  Eye: Denies Blurred vision. Denies Discharge. Denies Eye pain.  ENT: Denies Decreased hearing. Denies Sore throat. Denies Swollen glands.  Respiratory: Denies Cough. Denies Shortness of breath. Denies Shortness of breath with exertion. Denies Wheezing.  Cardiovascular: Denies Chest pain at rest. Denies Chest pain with exertion. Denies Irregular heartbeat. Denies Palpitations. Denies Edema.  Gastrointestinal: Denies Abdominal pain. Denies Diarrhea. Denies Nausea. Denies Vomiting. Denies Hematemesis or Hematochezia.  Genitourinary: Denies Dysuria. Denies Urinary frequency. Denies Urinary urgency. Denies Blood in urine.  Endocrine: Denies Cold intolerance. Denies Excessive thirst. Denies Heat intolerance. Denies Weight loss. Denies Weight gain.  Musculoskeletal: Admits Painful joints. Denies  Weakness.  Integumentary: Denies Rash. Denies Itching. Denies Dry skin.  Neurologic: Denies Dizziness. Denies Fainting. Denies Headache.  Psychiatric: Denies Depression. Denies Anxiety. Denies Suicidal/Homicidal ideations.    All Other ROS: Negative except as stated in HPI.     Medical / Surgical / Social / Family History       ----------------------------  Hyperlipidemia  Menopause     Past Surgical History:   Procedure Laterality Date    ADENOIDECTOMY      CHOLECYSTECTOMY      COLONOSCOPY      with biopsy    TONSILLECTOMY         Social History     Socioeconomic History    Marital status:    Tobacco Use    Smoking status: Never     Passive exposure: Never    Smokeless tobacco: Never   Substance and Sexual Activity    Alcohol use: Not Currently     Comment: I ONLY WINE MAYBE TWICE year    Drug use: Never    Sexual activity: Not Currently     Partners: Male     Social Determinants of Health     Transportation Needs: No Transportation Needs (9/23/2022)    PRAPARE - Transportation     Lack of Transportation (Medical): No     Lack of Transportation (Non-Medical): No   Housing Stability: Low Risk  (9/23/2022)    Housing Stability Vital Sign     Unable to Pay for Housing in the Last Year: No     Number of Places Lived in the Last Year: 1     Unstable Housing in the Last Year: No        Family History   Problem Relation Age of Onset    Heart disease Mother     Diabetes Mother     Alzheimer's disease Mother     Stroke Mother     Cancer Father         lung    Heart failure Father     Cancer Sister         ovarian    Diabetes Sister     Rashes / Skin problems Sister     Diabetes Brother     Epilepsy Brother     Heart failure Maternal Grandmother     Osteoarthritis Maternal Aunt     Cancer Sister     Diabetes Sister     Ovarian cancer Sister     Diabetes Brother     Rashes / Skin problems Brother     Seizures Brother     Stroke Brother         Medications and Allergies     Medications  Current Outpatient Medications  "  Medication Instructions    cholecalciferol, vitamin D3, 325 mcg (13,000 unit) Cap capsule Oral, 2 times daily    herbal drugs Tab Oral, Over the counter herbal supplement    multivitamin capsule 1 capsule, Oral, Daily    omega-3 acid ethyl esters (LOVAZA) 2 g, Oral, 2 times daily    THYROID ORAL Oral, Over the counter herbal supplement         Allergies  Review of patient's allergies indicates:  No Known Allergies    Physical Examination     /75 (BP Location: Right arm, Patient Position: Sitting, BP Method: Medium (Automatic))   Pulse 75   Temp 98.4 °F (36.9 °C) (Oral)   Resp 20   Ht 5' 2.99" (1.6 m)   Wt 94.1 kg (207 lb 6.4 oz)   BMI 36.75 kg/m²     Physical Exam  Musculoskeletal:      Right hand: Deformity and tenderness present.      Left hand: Deformity and tenderness present.         General: Alert and oriented, No acute distress.  Head: Normocephalic, Atraumatic.  Eye: Pupils are equal, round and reactive to light, Extraocular movements are intact, Sclera non-icteric.  Ears/Nose/Throat: Normal, Mucosa moist,Clear.  Neck/Thyroid: Supple, Non-tender, No carotid bruit, No lymphadenopathy, No JVD, Full range of motion.  Respiratory: Clear to auscultation bilaterally; No wheezes, rales or rhonchi, Non-labored respirations, Symmetrical chest wall expansion.  Cardiovascular: Regular rate and rhythm, S1/S2 normal, No murmurs, rubs or gallops.  Gastrointestinal: Soft, Non-tender, Non-distended, Normal bowel sounds, No palpable organomegaly.  Integumentary: Warm, Dry, Intact, No suspicious lesions or rashes.  Extremities: No clubbing, cyanosis or edema  Neurologic: No focal deficits, Cranial Nerves II-XII are grossly intact, Motor strength normal upper and lower extremities, Sensory exam intact.  Psychiatric: Normal interaction, Coherent speech, Appropriate affect       Results     Lab Results   Component Value Date    WBC 5.34 07/24/2023    HGB 14.1 07/24/2023    HCT 43.5 07/24/2023     07/24/2023 "    CHOL 242 (H) 07/24/2023    TRIG 157 (H) 07/24/2023    ALT 23 07/24/2023    AST 19 07/24/2023     07/24/2023    K 4.1 07/24/2023    CREATININE 0.83 07/24/2023    BUN 14.5 07/24/2023    CO2 31 07/24/2023    TSH 4.392 07/24/2023    HGBA1C 4.7 07/24/2023      Latest Reference Range & Units 07/24/23 09:00   Pathology Review  No serological evidence of recent or past hepatitis A, B, or C infection.    Vikram Lindsey M.D.      Hep A IgM Nonreactive  Nonreactive   Hep B C IgM Nonreactive  Nonreactive   Hepatitis B Surface Antigen Nonreactive  Nonreactive   Hepatitis C Ab Nonreactive  Nonreactive   HIV Nonreactive  Nonreactive      Latest Reference Range & Units 07/24/23 09:00   JENNIFER Titer  1:80 !   JENNIFER Interpretive Comment  See Note   JENNIFER Pattern  Speckled !   Antinuclear Antibody (JENNIFER), HEp-2, IgG <1:80  Detected (H)   Rheumatoid Factor, IgA by AFSHIN <=6 Units <5   Rheumatoid Factor, IgG by AFSHIN <=6 Units <5   Rheumatoid Factor, IgM by AFSHIN <=6 Units <5   !: Data is abnormal  (H): Data is abnormally high  Narrative    MM 3D XENIA SCREENING MAMMOGRAM BILATERAL     INDICATION:   Screening for breast cancer. Lifetime risk of developing breast cancer in   accordance with Tyrer-Cuzick model is estimated at 9.1%.     COMPARISON:   Prior mammograms dated 9/28/2020, 5/18/2018, 5/5/2017. Breast ultrasounds   dated 4/5/2021 and 10/6/2020.     TECHNIQUE:   The following mammographic views were obtained using digital mammographic   technique: Bilateral craniocaudal and bilateral mediolateral oblique , and   right XCCL with and without Tomosynthesis. The examination was reviewed   utilizing the [employer] image [employer] computer aided detection (CAD) system.     DENSITY:   There are scattered fibroglandular densities.     FINDINGS:     Focal asymmetry within the upper outer left breast.     No new suspicious masses, microcalcifications, or other abnormalities are   seen within the right breast.     IMPRESSION:   Additional  evaluation recommended for focal asymmetry within the upper   outer left breast at posterior depth.     BI-RADS Category 0:   Incomplete-Need Additional Imaging Evaluation       RECOMMENDATIONS:   A left diagnostic mammogram is recommended. Pending additional   mammographic evaluation, a targeted left breast ultrasound may be   warranted. The [facility] breast imaging staff will schedule the diagnostic   examination.   Exam End: 10/28/22 08:28 Last Resulted: 11/02/22 12:49   Mammo Digital Diagnostic Left  Order: [id number]  Narrative    LEFT DIGITAL DIAGNOSTIC MAMMOGRAM AND DIAGNOSTIC LEFT BREAST ULTRASOUND     INDICATION:   Left breast focal asymmetry     COMPARISON:   Screening mammogram 10/28/2022     TECHNIQUE:   Left ML view and spot compression left CC and MLO views without and with   Tomosynthesis. R2 CAD utilized.       Targeted sonographic imaging of the left breasts assessing the 2:00   position, 6 cm from the nipple.     MAMMOGRAPHIC FINDINGS:   Persistent upper outer quadrant left breast mass with circumscribed and   lobulated margins.     SONOGRAPHIC FINDINGS:   Benign cyst cluster corresponds to region of mammographic concern. Cyst   cluster measures 9 mm in maximum diameter.     IMPRESSION:   Benign cyst cluster. No evidence of malignancy within the left breast.     BI-RADS Category 2:   BENIGN     RECOMMENDATIONS:   In the absence of significant clinical findings in the interval, recommend   annual follow-up screening mammography in October 2023.     Findings and recommendations were discussed directly with the patient   after exam completion.     We appreciate the opportunity to participate in the care of your patient.   Thank you for choosing [facility] breast imaging Center.  Exam End: 12/07/22 08:58 Last Resulted: 12/07/22 09:21   Outside [place] COLONOSCOPY,REMV LESN,SNARE Performed on Friday April 13, 2018     Provider Linked Diagnosis   [place] COLONOSCOPY,REMV LESN,SNARE performed at [place]  [employer]   [facility] [employer] received Sunday July 30, 2023   External Claim, received Sunday July 30, 2023    DRU DEMPSEY,   [facility] [employer]    Personal history of colonic polyps,   Diverticulosis of large intestine without perforation or abscess without bleeding,   Benign neoplasm of rectum               Assessment and Plan (including Health Maintenance)     Plan:     1. Wellness examination  Cervical Cancer Screening - Last Pap/pelvic on 7/13/23. Follow up with GYN Clinic for annual Pap/Pelvic.  Breast Cancer Screening - Last Mammogram on 10/28/22. Birads 0. Left Dx MMG/US on 12/7/22 Birads 2. Follow up annually.  Colon Cancer Screening - Colonoscopy on 4/13/18. F/U Colonoscopy in 10 yrs (4/2018).  Osteoporosis Screening - Last DEXA on 5 yrs ago. Results show Normal Bone Density. Dexa ordered.  Eye Exam - Last Eye Exam early 2023. Wears eyeglasses  Dental Exam - Has upper and lower dentures.  Vaccinations: Flu - / Covid - / Pneumonia - / Shingles - / Tetanus -  Labwork - UTD    2. Positive JENNIFER (antinuclear antibody)  Positive JENNIFER; speckled pattern, titer 1:80  Declines referral to rheum at this time.  Will refer if symptoms worsen; are tolerable at this time.     3. Mixed hyperlipidemia   / Trig 157 / HDL - 72  / Total chol - 242.  Declines med management; will continue dietary modifications.  Follow a low cholesterol, low saturated fat diet with less than 200 mg of cholesterol a day.   Avoid fried foods and high saturated fats (cameron, sausage, cookies, cakes, chips, cheese, whole milk, butter, mayonnaise, creamy dressings, gravy and cream sauces).  Add flax seed or fish oil supplements to diet.   Increase dietary fiber.   Regular exercise improves cholesterol levels.  Physical activity 5 times a week for 30 minutes per day (or 150 minutes per week).   Stressed importance of dietary modifications.    - Lipid Panel; Future    4. Class 2 obesity due to excess  calories without serious comorbidity with body mass index (BMI) of 36.0 to 36.9 in adult  BMI 36. Goal BMI <30.  Aerobic exercise 150 minutes per week.  Avoid soda, simple sugars, sweets, excessive rice, pasta, potatoes or bread.   Choose brown options when available and portion control.  Limit fast foods and fried foods.   Choose complex carbs in moderation (ex: green, leafy vegetables, beans, oatmeal).  Eat plenty of fresh fruits and vegetables with lean meats daily.   Consider permanent healthy lifestyle changes.      5. Need for vaccination  - Tdap Vaccine    6. Post-menopausal  Dexa scan ordered.        Problem List Items Addressed This Visit          Cardiac/Vascular    Mixed hyperlipidemia (Chronic)    Relevant Orders    Lipid Panel       ID    Need for vaccination - Primary    Relevant Orders    Tdap Vaccine (Completed)       Immunology/Multi System    Positive JENNIFER (antinuclear antibody) (Chronic)       Endocrine    Class 2 obesity due to excess calories without serious comorbidity with body mass index (BMI) of 36.0 to 36.9 in adult (Chronic)       Other    Wellness examination     Other Visit Diagnoses       Post-menopausal        Relevant Orders    DXA Bone Density Axial Skeleton 1 or more sites    Encounter for screening for malignant neoplasm of colon                  Health Maintenance Due   Topic Date Due    COVID-19 Vaccine (3 - Pfizer series) 10/02/2021       Follow up in about 6 months (around 2/24/2024) for Follow up, Lab Results.        Signature:  ANGI Bruce  [facility]  [address] [address]

## 2023-09-07 ENCOUNTER — PATIENT MESSAGE (OUTPATIENT)
Dept: RESEARCH | Facility: HOSPITAL | Age: 63
End: 2023-09-07
Payer: COMMERCIAL

## 2023-09-12 ENCOUNTER — HOSPITAL ENCOUNTER (OUTPATIENT)
Dept: RADIOLOGY | Facility: HOSPITAL | Age: 63
Discharge: HOME | End: 2023-09-12
Attending: NURSE PRACTITIONER
Payer: COMMERCIAL

## 2023-09-12 DIAGNOSIS — Z78.0 POST-MENOPAUSAL: ICD-10-CM

## 2023-09-12 PROCEDURE — 77080 DXA BONE DENSITY AXIAL: CPT | Mod: TC

## 2023-11-27 PROBLEM — Z00.00 WELLNESS EXAMINATION: Status: RESOLVED | Noted: 2023-08-24 | Resolved: 2023-11-27

## 2023-12-13 LAB — BCS RECOMMENDATION EXT: NORMAL

## 2024-02-26 ENCOUNTER — OFFICE VISIT (OUTPATIENT)
Dept: INTERNAL MEDICINE | Facility: CLINIC | Age: 64
End: 2024-02-26
Payer: MEDICAID

## 2024-02-26 ENCOUNTER — TELEPHONE (OUTPATIENT)
Dept: INTERNAL MEDICINE | Facility: CLINIC | Age: 64
End: 2024-02-26

## 2024-02-26 ENCOUNTER — LAB VISIT (OUTPATIENT)
Dept: LAB | Facility: HOSPITAL | Age: 64
End: 2024-02-26
Attending: NURSE PRACTITIONER
Payer: MEDICAID

## 2024-02-26 VITALS
RESPIRATION RATE: 18 BRPM | BODY MASS INDEX: 37.42 KG/M2 | TEMPERATURE: 98 F | WEIGHT: 211.19 LBS | SYSTOLIC BLOOD PRESSURE: 114 MMHG | HEIGHT: 63 IN | HEART RATE: 77 BPM | DIASTOLIC BLOOD PRESSURE: 64 MMHG

## 2024-02-26 DIAGNOSIS — R92.8 ABNORMALITY OF LEFT BREAST ON SCREENING MAMMOGRAM: ICD-10-CM

## 2024-02-26 DIAGNOSIS — R92.8 ABNORMAL MAMMOGRAM OF LEFT BREAST: ICD-10-CM

## 2024-02-26 DIAGNOSIS — E66.01 CLASS 2 SEVERE OBESITY DUE TO EXCESS CALORIES WITH SERIOUS COMORBIDITY AND BODY MASS INDEX (BMI) OF 37.0 TO 37.9 IN ADULT: Chronic | ICD-10-CM

## 2024-02-26 DIAGNOSIS — E78.2 MIXED HYPERLIPIDEMIA: Primary | Chronic | ICD-10-CM

## 2024-02-26 DIAGNOSIS — E78.2 MIXED HYPERLIPIDEMIA: Chronic | ICD-10-CM

## 2024-02-26 PROBLEM — E66.812 CLASS 2 SEVERE OBESITY DUE TO EXCESS CALORIES WITH SERIOUS COMORBIDITY AND BODY MASS INDEX (BMI) OF 37.0 TO 37.9 IN ADULT: Chronic | Status: ACTIVE | Noted: 2024-02-26

## 2024-02-26 LAB
CHOLEST SERPL-MCNC: 220 MG/DL
CHOLEST/HDLC SERPL: 3 {RATIO} (ref 0–5)
HDLC SERPL-MCNC: 69 MG/DL (ref 35–60)
LDLC SERPL CALC-MCNC: 130 MG/DL (ref 50–140)
TRIGL SERPL-MCNC: 104 MG/DL (ref 37–140)
VLDLC SERPL CALC-MCNC: 21 MG/DL

## 2024-02-26 PROCEDURE — 3074F SYST BP LT 130 MM HG: CPT | Mod: CPTII,,, | Performed by: NURSE PRACTITIONER

## 2024-02-26 PROCEDURE — 36415 COLL VENOUS BLD VENIPUNCTURE: CPT

## 2024-02-26 PROCEDURE — 99214 OFFICE O/P EST MOD 30 MIN: CPT | Mod: PBBFAC | Performed by: NURSE PRACTITIONER

## 2024-02-26 PROCEDURE — 3078F DIAST BP <80 MM HG: CPT | Mod: CPTII,,, | Performed by: NURSE PRACTITIONER

## 2024-02-26 PROCEDURE — 80061 LIPID PANEL: CPT

## 2024-02-26 PROCEDURE — 3008F BODY MASS INDEX DOCD: CPT | Mod: CPTII,,, | Performed by: NURSE PRACTITIONER

## 2024-02-26 PROCEDURE — 1159F MED LIST DOCD IN RCRD: CPT | Mod: CPTII,,, | Performed by: NURSE PRACTITIONER

## 2024-02-26 PROCEDURE — 1160F RVW MEDS BY RX/DR IN RCRD: CPT | Mod: CPTII,,, | Performed by: NURSE PRACTITIONER

## 2024-02-26 PROCEDURE — 99214 OFFICE O/P EST MOD 30 MIN: CPT | Mod: S$PBB,,, | Performed by: NURSE PRACTITIONER

## 2024-02-26 NOTE — PROGRESS NOTES
Cindy Palacios, ANGI   [facility]  [address] [facility] [address] [address]      PATIENT NAME: Alma Rosa Disla  : 1960  DATE: 24  MRN: [id number]      Reason for Visit / Chief Complaint: Follow-up (Labs, no c/o voiced)       History of Present Illness / Problem Focused Workflow     Alma Rosa Disla is a 63 y.o. White female presents to the clinic for HLD f/u. Medical problems include obesity, and psoriatic arthritis (formerly seeing Dr. Baum). Followed by Dr. Cornelio Blackwood, GYN.     Review of EMR revealed abnormal mammogram in Dec from [facility]. Pt reports that her insurance has changed and she cannot complete her dx left MMG at [facility]. Pt continues to take OTC supplements daily. Has resumed low fat diet; had indulged during the holidays and has lost some of the weight she gained. Labs drawn just before visit; will contact if abn and meds required. Is amenable if indicated. Denies chest pain, shortness of breath, cough, fever, headache, dizziness, weakness, abdominal pain, nausea, vomiting, diarrhea, constipation, dysuria, depression, anxiety, SI/HI.    Review of Systems     Review of Systems     See HPI for details    Constitutional: Denies Change in appetite. Denies Chills. Denies Fever. Denies Night sweats.  Eye: Denies Blurred vision. Denies Discharge. Denies Eye pain.  ENT: Denies Decreased hearing. Denies Sore throat. Denies Swollen glands.  Respiratory: Denies Cough. Denies Shortness of breath. Denies Shortness of breath with exertion. Denies Wheezing.  Cardiovascular: DeniesChest pain at rest. Denies Chest pain with exertion. Denies Irregular heartbeat. Denies Palpitations. Denies Edema.  Gastrointestinal: Denies Abdominal pain. Denies Diarrhea. Denies Nausea. Denies Vomiting. Denies Hematemesis or Hematochezia.  Genitourinary: Denies Dysuria. Denies Urinary frequency. Denies Urinary urgency. Denies Blood in urine.  Endocrine:  Denies Cold intolerance. Denies Excessive thirst. Denies Heat intolerance. Denies Weight loss. Denies Weight gain.  Musculoskeletal: Denies Painful joints. Denies Weakness.  Integumentary: Denies Rash. Denies Itching. Denies Dry skin.  Neurologic: Denies Dizziness. Denies Fainting. Denies Headache.  Psychiatric: Denies Depression. Denies Anxiety. Denies Suicidal/Homicidal ideations.    All Other ROS: Negative except as stated in HPI.     Medical / Surgical / Social / Family History       ----------------------------  Hyperlipidemia  Menopause     Past Surgical History:   Procedure Laterality Date    ADENOIDECTOMY      CHOLECYSTECTOMY      COLONOSCOPY      with biopsy    TONSILLECTOMY         Social History     Socioeconomic History    Marital status:    Tobacco Use    Smoking status: Never     Passive exposure: Never    Smokeless tobacco: Never   Substance and Sexual Activity    Alcohol use: Not Currently     Comment: I ONLY WINE MAYBE TWICE year    Drug use: Never    Sexual activity: Not Currently     Partners: Male     Social Determinants of Health     Financial Resource Strain: Low Risk  (2/26/2024)    Overall Financial Resource Strain (CARDIA)     Difficulty of Paying Living Expenses: Not hard at all   Food Insecurity: No Food Insecurity (2/26/2024)    Hunger Vital Sign     Worried About Running Out of Food in the Last Year: Never true     Ran Out of Food in the Last Year: Never true   Transportation Needs: No Transportation Needs (9/23/2022)    PRAPARE - Transportation     Lack of Transportation (Medical): No     Lack of Transportation (Non-Medical): No   Housing Stability: Low Risk  (9/23/2022)    Housing Stability Vital Sign     Unable to Pay for Housing in the Last Year: No     Number of Places Lived in the Last Year: 1     Unstable Housing in the Last Year: No        Family History   Problem Relation Age of Onset    Heart disease Mother     Diabetes Mother     Alzheimer's disease Mother     Stroke  "Mother     Cancer Father         lung    Heart failure Father     Cancer Sister         ovarian    Diabetes Sister     Rashes / Skin problems Sister     Diabetes Brother     Epilepsy Brother     Heart failure Maternal Grandmother     Osteoarthritis Maternal Aunt     Cancer Sister     Diabetes Sister     Ovarian cancer Sister     Diabetes Brother     Rashes / Skin problems Brother     Seizures Brother     Stroke Brother         Medications and Allergies     Medications  Current Outpatient Medications   Medication Instructions    cholecalciferol, vitamin D3, 325 mcg (13,000 unit) Cap capsule Oral, 2 times daily    herbal drugs Tab Oral, Over the counter herbal supplement    multivitamin capsule 1 capsule, Oral, Daily    omega-3 acid ethyl esters (LOVAZA) 2 g, Oral, 2 times daily    THYROID ORAL Oral, Over the counter herbal supplement         Allergies  Review of patient's allergies indicates:  No Known Allergies    Physical Examination     /64 (BP Location: Right arm, Patient Position: Lying, BP Method: Medium (Automatic))   Pulse 77   Temp 97.9 °F (36.6 °C) (Oral)   Resp 18   Ht 5' 2.99" (1.6 m)   Wt 95.8 kg (211 lb 3.2 oz)   BMI 37.42 kg/m²     Physical Exam  Constitutional:       Appearance: She is obese.         General: Alert and oriented, No acute distress.  Head: Normocephalic, Atraumatic.  Eye: Pupils are equal, round and reactive to light, Extraocular movements are intact, Sclera non-icteric.  Ears/Nose/Throat: Normal, Mucosa moist,Clear.  Neck/Thyroid: Supple, Non-tender, No carotid bruit, No lymphadenopathy, No JVD, Full range of motion.  Respiratory: Clear to auscultation bilaterally; No wheezes, rales or rhonchi,Non-labored respirations, Symmetrical chest wall expansion.  Cardiovascular: Regular rate and rhythm, S1/S2 normal, No murmurs, rubs or gallops.  Gastrointestinal: Soft, Non-tender, Non-distended, Normal bowel sounds, No palpable organomegaly.  Musculoskeletal: Normal range of " motion.  Integumentary: Warm, Dry, Intact, No suspicious lesions or rashes.  Extremities: No clubbing, cyanosis or edema  Neurologic: No focal deficits, Cranial Nerves II-XII are grossly intact, Motor strength normal upper and lower extremities, Sensory exam intact.  Psychiatric: Normal interaction, Coherent speech, Appropriate affect       Results     Lab Results   Component Value Date    WBC 5.34 07/24/2023    HGB 14.1 07/24/2023    HCT 43.5 07/24/2023     07/24/2023    CHOL 242 (H) 07/24/2023    TRIG 157 (H) 07/24/2023    ALT 23 07/24/2023    AST 19 07/24/2023     07/24/2023    K 4.1 07/24/2023    CREATININE 0.83 07/24/2023    BUN 14.5 07/24/2023    CO2 31 07/24/2023    TSH 4.392 07/24/2023    HGBA1C 4.7 07/24/2023         Assessment and Plan (including Health Maintenance)     Plan:     1. Mixed hyperlipidemia  FLP pending.  Pt amendable to meds if indicated.  Follow a low cholesterol, low saturated fat diet with less than 200 mg of cholesterol a day.   Avoid fried foods and high saturated fats (cameron, sausage, cookies, cakes, chips, cheese, whole milk, butter, mayonnaise, creamy dressings, gravy and cream sauces).  Add flax seed or fish oil supplements to diet.   Increase dietary fiber.   Regular exercise improves cholesterol levels.  Physical activity 5 times a week for 30 minutes per day (or 150 minutes per week).   Stressed importance of dietary modifications.      2. Class 2 severe obesity due to excess calories with serious comorbidity and body mass index (BMI) of 37.0 to 37.9 in adult  BMI 37. Goal BMI <30.  Aerobic exercise 150 minutes per week.  Avoid soda, simple sugars, sweets, excessive rice, pasta, potatoes or bread.   Choose brown options when available and portion control.  Limit fast foods and fried foods.   Choose complex carbs in moderation (ex: green, leafy vegetables, beans, oatmeal).  Eat plenty of fresh fruits and vegetables with lean meats daily.   Consider permanent healthy  lifestyle changes.    3. Abnormality of left breast on screening mammogram  MMG in Dec shows Birads 0 for left breast.  Release signed for records.   Left Dx MMG ordered.  Keep appt when scheduled.   - Mammo Digital Diagnostic Left with [facility]; Future      Problem List Items Addressed This Visit          Cardiac/Vascular    Mixed hyperlipidemia - Primary (Chronic)    Relevant Orders    Lipid Panel       Renal/    Abnormal mammogram of left breast       Endocrine    Class 2 severe obesity due to excess calories with serious comorbidity and body mass index (BMI) of 37.0 to 37.9 in adult (Chronic)    Relevant Orders    CBC Auto Differential    Comprehensive Metabolic Panel    Hemoglobin A1C    TSH    Urinalysis, Reflex to Urine Culture     Other Visit Diagnoses       Abnormality of left breast on screening mammogram        Relevant Orders    Mammo Digital Diagnostic Left with [facility]              Health Maintenance Due   Topic Date Due    RSV Vaccine (Age 60+ and Pregnant patients) (1 - 1-dose 60+ series) Never done    COVID-19 Vaccine (3 - 2023-24 season) 09/01/2023       Follow up in about 6 months (around 8/26/2024) for Follow up, Wellness, Lab Results.        Signature:  ANGI Bruce  [facility]  [address] [facility] [address]       patient

## 2024-02-26 NOTE — TELEPHONE ENCOUNTER
----- Message from Sabra Dumont LPN sent at 2/26/2024  2:30 PM CST -----  You saw pt today, she states she is having difficulty hearing and would like referral to ENT  ----- Message -----  From: Giselle Newman  Sent: 2/26/2024   2:21 PM CST  To: [facility] Staff    Type:  Patient Requesting Referral    Who Called: Pt  Does the patient already have the specialty appointment scheduled?: No  If yes, what is the date of that appointment?:  Referral to What Specialty: ENT  Reason for Referral: Hearing  Does the patient want the referral with a specific physician?: No  [facility]  Patient Requesting a Response?: Yes  Would the patient rather a call back or a response via [facility]? Call/My [facility]  Best Call Back Number: [phone]  Additional Information:

## 2024-02-26 NOTE — TELEPHONE ENCOUNTER
Inform the pt that I would have to assess her ears to ensure that she does not have any infection or cerumen impaction prior to sending an ENT referral. She can be scheduled on a Wednesday 3/6 or 3/13 for a face to face visit (both are virtual slots). If she cannot attend either time, schedule first available face to face visit. Thanks

## 2024-03-21 ENCOUNTER — OFFICE VISIT (OUTPATIENT)
Dept: INTERNAL MEDICINE | Facility: CLINIC | Age: 64
End: 2024-03-21
Payer: MEDICAID

## 2024-03-21 VITALS
OXYGEN SATURATION: 97 % | RESPIRATION RATE: 20 BRPM | TEMPERATURE: 98 F | WEIGHT: 214.38 LBS | SYSTOLIC BLOOD PRESSURE: 122 MMHG | HEIGHT: 62 IN | BODY MASS INDEX: 39.45 KG/M2 | HEART RATE: 84 BPM | DIASTOLIC BLOOD PRESSURE: 70 MMHG

## 2024-03-21 DIAGNOSIS — H93.13 TINNITUS OF BOTH EARS: Primary | ICD-10-CM

## 2024-03-21 DIAGNOSIS — H91.93 BILATERAL HEARING LOSS, UNSPECIFIED HEARING LOSS TYPE: ICD-10-CM

## 2024-03-21 PROCEDURE — 3078F DIAST BP <80 MM HG: CPT | Mod: CPTII,,, | Performed by: NURSE PRACTITIONER

## 2024-03-21 PROCEDURE — 3074F SYST BP LT 130 MM HG: CPT | Mod: CPTII,,, | Performed by: NURSE PRACTITIONER

## 2024-03-21 PROCEDURE — 3008F BODY MASS INDEX DOCD: CPT | Mod: CPTII,,, | Performed by: NURSE PRACTITIONER

## 2024-03-21 PROCEDURE — 99215 OFFICE O/P EST HI 40 MIN: CPT | Mod: PBBFAC | Performed by: NURSE PRACTITIONER

## 2024-03-21 PROCEDURE — 1160F RVW MEDS BY RX/DR IN RCRD: CPT | Mod: CPTII,,, | Performed by: NURSE PRACTITIONER

## 2024-03-21 PROCEDURE — 99213 OFFICE O/P EST LOW 20 MIN: CPT | Mod: S$PBB,,, | Performed by: NURSE PRACTITIONER

## 2024-03-21 PROCEDURE — 1159F MED LIST DOCD IN RCRD: CPT | Mod: CPTII,,, | Performed by: NURSE PRACTITIONER

## 2024-03-21 NOTE — PROGRESS NOTES
Cindy Palacios, ANGI   [facility]  [address] [facility] [address]      PATIENT NAME: Alma Rosa Disla  : 1960  DATE: 3/21/24  MRN: [id number]      Reason for Visit / Chief Complaint: Follow-up and Referral       History of Present Illness / Problem Focused Workflow     Alma Rosa Disla is a 63 y.o. White female presents to the clinic for ear evaluation and possible ENT referral. Medical problems include obesity, and psoriatic arthritis (formerly seeing Dr. Baum). Followed by Dr. Cornelio Blackwood, GYN.      Today, pt reports ringing in both ears x at least 10 yrs that she states is like normal. Also reports worsening hearing loss; feels like she has to turn the TV louder to hear it. States had frequent ear infections as a child. Last hearing test was about 15 yrs ago and was told hearing loss was mild at that time. Is requesting repeat hearing evaluation. Denies chest pain, shortness of breath, cough, fever, headache, dizziness, weakness, abdominal pain, nausea, vomiting, diarrhea, constipation, dysuria, depression, anxiety, SI/HI.    Review of Systems     Review of Systems     See HPI for details    Constitutional: Denies Change in appetite. Denies Chills. Denies Fever. Denies Night sweats.  Eye: Denies Blurred vision. Denies Discharge. Denies Eye pain.  ENT: Admits Decreased hearing. Denies Sore throat. Denies Swollen glands. Admits Ringing in ears.  Respiratory: Denies Cough. Denies Shortness of breath. Denies Shortness of breath with exertion. Denies Wheezing.  Cardiovascular: DeniesChest pain at rest. Denies Chest pain with exertion. Denies Irregular heartbeat. Denies Palpitations. Denies Edema.  Gastrointestinal: Denies Abdominal pain. Denies Diarrhea. Denies Nausea. Denies Vomiting. Denies Hematemesis or Hematochezia.  Genitourinary: Denies Dysuria. Denies Urinary frequency. Denies Urinary urgency. Denies Blood in urine.  Endocrine:  Denies Cold intolerance. Denies Excessive thirst. Denies Heat intolerance. Denies Weight loss. Denies Weight gain.  Musculoskeletal: Denies Painful joints. Denies Weakness.  Integumentary: Denies Rash. Denies Itching. Denies Dry skin.  Neurologic: Denies Dizziness. Denies Fainting. Denies Headache.  Psychiatric: Denies Depression. Denies Anxiety. Denies Suicidal/Homicidal ideations.    All Other ROS: Negative except as stated in HPI.     Medical / Surgical / Social / Family History       ----------------------------  Hyperlipidemia  Menopause     Past Surgical History:   Procedure Laterality Date    ADENOIDECTOMY      CHOLECYSTECTOMY      COLONOSCOPY      with biopsy    TONSILLECTOMY         Social History     Socioeconomic History    Marital status:    Tobacco Use    Smoking status: Never     Passive exposure: Never    Smokeless tobacco: Never   Substance and Sexual Activity    Alcohol use: Not Currently     Comment: I ONLY WINE MAYBE TWICE year    Drug use: Never    Sexual activity: Not Currently     Partners: Male     Social Determinants of Health     Financial Resource Strain: Low Risk  (3/20/2024)    Overall Financial Resource Strain (CARDIA)     Difficulty of Paying Living Expenses: Not very hard   Food Insecurity: No Food Insecurity (3/20/2024)    Hunger Vital Sign     Worried About Running Out of Food in the Last Year: Never true     Ran Out of Food in the Last Year: Never true   Transportation Needs: No Transportation Needs (3/20/2024)    PRAPARE - Transportation     Lack of Transportation (Medical): No     Lack of Transportation (Non-Medical): No   Social Connections: Unknown (3/20/2024)    Social Connection and Isolation Panel [NHANES]     Frequency of Communication with Friends and Family: Twice a week     Frequency of Social Gatherings with Friends and Family: Twice a week     Attends Club or Organization Meetings: Never     Marital Status:    Housing Stability: Unknown (3/20/2024)     "Housing Stability Vital Sign     Unable to Pay for Housing in the Last Year: No     Unstable Housing in the Last Year: No        Family History   Problem Relation Age of Onset    Heart disease Mother     Diabetes Mother     Alzheimer's disease Mother     Stroke Mother     Cancer Father         lung    Heart failure Father     Cancer Sister         ovarian    Diabetes Sister     Rashes / Skin problems Sister     Diabetes Brother     Epilepsy Brother     Heart failure Maternal Grandmother     Osteoarthritis Maternal Aunt     Cancer Sister     Diabetes Sister     Ovarian cancer Sister     Diabetes Brother     Rashes / Skin problems Brother     Seizures Brother     Stroke Brother         Medications and Allergies     Medications  Current Outpatient Medications   Medication Instructions    cholecalciferol, vitamin D3, 325 mcg (13,000 unit) Cap capsule Oral, 2 times daily    herbal drugs Tab Oral, Over the counter herbal supplement    multivitamin capsule 1 capsule, Oral, Daily    omega-3 acid ethyl esters (LOVAZA) 2 g, Oral, 2 times daily    THYROID ORAL Oral, Over the counter herbal supplement         Allergies  Review of patient's allergies indicates:  No Known Allergies    Physical Examination     /70 (BP Location: Right arm, Patient Position: Sitting, BP Method: Medium (Automatic))   Pulse 84   Temp 98.3 °F (36.8 °C) (Oral)   Resp 20   Ht 5' 2" (1.575 m)   Wt 97.3 kg (214 lb 6.4 oz)   SpO2 97%   BMI 39.21 kg/m²     Physical Exam  Constitutional:       Appearance: She is obese.         General: Alert and oriented, No acute distress.  Head: Normocephalic, Atraumatic.  Eye: Pupils are equal, round and reactive to light, Extraocular movements are intact, Sclera non-icteric.  Ears/Nose/Throat: Normal, Mucosa moist,Clear.  Neck/Thyroid: Supple, Non-tender, No carotid bruit, No lymphadenopathy, No JVD, Full range of motion.  Respiratory: Clear to auscultation bilaterally; No wheezes, rales or " rhonchi,Non-labored respirations, Symmetrical chest wall expansion.  Cardiovascular: Regular rate and rhythm, S1/S2 normal, No murmurs, rubs or gallops.  Gastrointestinal: Soft, Non-tender, Non-distended, Normal bowel sounds, No palpable organomegaly.  Musculoskeletal: Normal range of motion.  Integumentary: Warm, Dry, Intact, No suspicious lesions or rashes.  Extremities: No clubbing, cyanosis or edema  Neurologic: No focal deficits, Cranial Nerves II-XII are grossly intact, Motor strength normal upper and lower extremities, Sensory exam intact.  Psychiatric: Normal interaction, Coherent speech, Appropriate affect       Results     Lab Results   Component Value Date    WBC 5.34 07/24/2023    HGB 14.1 07/24/2023    HCT 43.5 07/24/2023     07/24/2023    CHOL 220 (H) 02/26/2024    TRIG 104 02/26/2024    ALT 23 07/24/2023    AST 19 07/24/2023     07/24/2023    K 4.1 07/24/2023    CREATININE 0.83 07/24/2023    BUN 14.5 07/24/2023    CO2 31 07/24/2023    TSH 4.392 07/24/2023    HGBA1C 4.7 07/24/2023         Assessment and Plan (including Health Maintenance)     Plan:     1. Tinnitus of both ears  Referral to audio to eval/treat.  Keep apt when scheduled.  - Ambulatory referral/consult to Audiology; Future    2. Bilateral hearing loss, unspecified hearing loss type  Referral to audio to eval/treat.  Keep apt when scheduled.  - Ambulatory referral/consult to Audiology; Future      Problem List Items Addressed This Visit          ENT    Tinnitus of both ears - Primary    Relevant Orders    Ambulatory referral/consult to Audiology    Bilateral hearing loss    Relevant Orders    Ambulatory referral/consult to Audiology         Health Maintenance Due   Topic Date Due    RSV Vaccine (Age 60+ and Pregnant patients) (1 - 1-dose 60+ series) Never done    COVID-19 Vaccine (3 - 2023-24 season) 09/01/2023       No follow-ups on file.        Signature:  ANGI Bruce  [facility]  [address] [facility] [address]

## 2024-03-25 ENCOUNTER — CLINICAL SUPPORT (OUTPATIENT)
Dept: AUDIOLOGY | Facility: HOSPITAL | Age: 64
End: 2024-03-25
Payer: MEDICAID

## 2024-03-25 DIAGNOSIS — H91.93 BILATERAL HEARING LOSS, UNSPECIFIED HEARING LOSS TYPE: ICD-10-CM

## 2024-03-25 DIAGNOSIS — H90.3 SENSORINEURAL HEARING LOSS, BILATERAL: Primary | ICD-10-CM

## 2024-03-25 DIAGNOSIS — H93.13 TINNITUS OF BOTH EARS: ICD-10-CM

## 2024-03-25 PROCEDURE — 92557 COMPREHENSIVE HEARING TEST: CPT | Performed by: AUDIOLOGIST

## 2024-03-25 PROCEDURE — 92567 TYMPANOMETRY: CPT | Performed by: AUDIOLOGIST

## 2024-03-25 NOTE — PROGRESS NOTES
Hearing Evaluation        Patient History: Mrs. Disla reports a gradual decrease in hearing, onset unknown. She also reports constant bilateral tinnitus. Vertigo and middle ear issues are denied. All additional history is unremarkable.        Test Results:                    Pure Tone Testing:      Right ear:       Mild sensorineural hearing loss from 250-8kHz. Speech reception threshold is in agreement with puretone findings.  Discrimination score of 100% is considered excellent.        Left ear:          Mild sensorineural hearing loss from 250-8kHz. Speech reception threshold is in agreement with puretone findings.  Discrimination score of 100% is considered excellent.                                                                            Tympanometry:                                           Right ear:       Type 'A' tymp, normal middle ear pressure/function     Left ear:          Type 'A' tymp, normal middle ear pressure/function                                           Interpretations:      Behavioral test findings indicate a mild SNHL, bilaterally. Speech reception thresholds obtained at 30dB, AU, and are in agreement with puretone findings. Speech discrimination scores of 100%, AU, are considered excellent.  Immittance measures indicate normal middle ear pressure/function, bilaterally.            Recommendations:   Return for a hearing evaluation if patient reports any changes in auditory status.

## 2024-04-03 ENCOUNTER — OFFICE VISIT (OUTPATIENT)
Dept: URGENT CARE | Facility: CLINIC | Age: 64
End: 2024-04-03
Payer: MEDICAID

## 2024-04-03 VITALS
BODY MASS INDEX: 38.27 KG/M2 | DIASTOLIC BLOOD PRESSURE: 74 MMHG | HEIGHT: 63 IN | TEMPERATURE: 99 F | HEART RATE: 90 BPM | OXYGEN SATURATION: 98 % | RESPIRATION RATE: 18 BRPM | WEIGHT: 216 LBS | SYSTOLIC BLOOD PRESSURE: 116 MMHG

## 2024-04-03 DIAGNOSIS — R30.0 BURNING WITH URINATION: ICD-10-CM

## 2024-04-03 DIAGNOSIS — R30.0 DYSURIA: Primary | ICD-10-CM

## 2024-04-03 LAB
APPEARANCE UR: CLEAR
BACTERIA #/AREA URNS AUTO: ABNORMAL /HPF
BILIRUB UR QL STRIP.AUTO: NEGATIVE
BILIRUB UR QL STRIP: NEGATIVE
COLOR UR AUTO: YELLOW
GLUCOSE UR QL STRIP.AUTO: NORMAL
GLUCOSE UR QL STRIP: NEGATIVE
HYALINE CASTS #/AREA URNS LPF: ABNORMAL /LPF
KETONES UR QL STRIP.AUTO: NEGATIVE
KETONES UR QL STRIP: NEGATIVE
LEUKOCYTE ESTERASE UR QL STRIP.AUTO: 25
LEUKOCYTE ESTERASE UR QL STRIP: NEGATIVE
MUCOUS THREADS URNS QL MICRO: ABNORMAL /LPF
NITRITE UR QL STRIP.AUTO: NEGATIVE
PH UR STRIP.AUTO: 5.5 [PH]
PH, POC UA: 5.5
POC BLOOD, URINE: NEGATIVE
POC NITRATES, URINE: NEGATIVE
PROT UR QL STRIP.AUTO: NEGATIVE
PROT UR QL STRIP: NEGATIVE
RBC #/AREA URNS AUTO: ABNORMAL /HPF
RBC UR QL AUTO: NEGATIVE
SP GR UR STRIP.AUTO: 1.02 (ref 1–1.03)
SP GR UR STRIP: 1.02 (ref 1–1.03)
SQUAMOUS #/AREA URNS LPF: ABNORMAL /HPF
UROBILINOGEN UR STRIP-ACNC: 0.2 (ref 0.1–1.1)
UROBILINOGEN UR STRIP-ACNC: NORMAL
WBC #/AREA URNS AUTO: ABNORMAL /HPF

## 2024-04-03 PROCEDURE — 99214 OFFICE O/P EST MOD 30 MIN: CPT | Mod: PBBFAC | Performed by: FAMILY MEDICINE

## 2024-04-03 PROCEDURE — 81001 URINALYSIS AUTO W/SCOPE: CPT | Performed by: FAMILY MEDICINE

## 2024-04-03 PROCEDURE — 81003 URINALYSIS AUTO W/O SCOPE: CPT | Mod: 59,PBBFAC | Performed by: FAMILY MEDICINE

## 2024-04-03 PROCEDURE — 87086 URINE CULTURE/COLONY COUNT: CPT | Performed by: FAMILY MEDICINE

## 2024-04-03 PROCEDURE — 99213 OFFICE O/P EST LOW 20 MIN: CPT | Mod: S$PBB,,, | Performed by: FAMILY MEDICINE

## 2024-04-03 NOTE — PROGRESS NOTES
"Subjective:       Patient ID: Alma Rosa Disla is a 63 y.o. female.    Vitals:  height is 5' 3" (1.6 m) and weight is 98 kg (216 lb). Her oral temperature is 98.5 °F (36.9 °C). Her blood pressure is 116/74 and her pulse is 90. Her respiration is 18 and oxygen saturation is 98%.     Chief Complaint: burning with urination (Burning with urination, lower back pain, urine odor x 1 day )    Patient noticed dysuria yesterday, mild burning, low back pain, possible urine odor.  Denies vaginal discharge or odor.  No fever.  Had a recent URI that she is recovering from.    All other systems are negative    Chart reviewed    Objective:   Physical Exam   Constitutional:  Non-toxic appearance. She does not appear ill. No distress.   Neck: Neck supple.   Abdominal: Normal appearance. She exhibits no distension. flat abdomen There is no abdominal tenderness. There is no rebound, no guarding, no left CVA tenderness and no right CVA tenderness.   Neurological: no focal deficit. She is alert.   Skin: Skin is warm, dry and not diaphoretic.   Psychiatric: Her behavior is normal. Mood normal.   Nursing note and vitals reviewed.      Results for orders placed or performed in visit on 04/03/24   POCT Urinalysis, Dipstick, Automated, W/O Scope   Result Value Ref Range    POC Blood, Urine Negative Negative    POC Bilirubin, Urine Negative Negative    POC Urobilinogen, Urine 0.2 0.1 - 1.1    POC Ketones, Urine Negative Negative    POC Protein, Urine Negative Negative    POC Nitrates, Urine Negative Negative    POC Glucose, Urine Negative Negative    pH, UA 5.5     POC Specific Gravity, Urine 1.020 1.003 - 1.029    POC Leukocytes, Urine Negative Negative       Assessment:     1. Dysuria    2. Burning with urination            Plan:   Discussed urine dip findings, exam is benign.  Patient is afebrile.  Will send urine for micro and reflex culture, notify if indicated.  Patient will drink plenty of fluids and monitor symptoms closely.  Return " to urgent care if needed.  ER precautions      Dysuria  -     Urinalysis, Reflex to Urine Culture    Burning with urination  -     POCT Urinalysis, Dipstick, Automated, W/O Scope        Please note: This chart was completed via voice to text dictation. It may contain typographical/word recognition errors. If there are any questions, please contact the provider for final clarification.

## 2024-04-05 LAB
BACTERIA UR CULT: ABNORMAL

## 2024-04-06 ENCOUNTER — TELEPHONE (OUTPATIENT)
Dept: URGENT CARE | Facility: CLINIC | Age: 64
End: 2024-04-06
Payer: MEDICAID

## 2024-04-06 NOTE — TELEPHONE ENCOUNTER
----- Message from Cornelius Olmstead MD sent at 4/5/2024  9:13 AM CDT -----  Please call and check on this patient.  Urine culture is not helpful, shows a poor specimen.  Patient should return to urgent care if still having significant symptoms.  If not, follow-up with PCP

## 2024-04-08 ENCOUNTER — TELEPHONE (OUTPATIENT)
Dept: URGENT CARE | Facility: CLINIC | Age: 64
End: 2024-04-08
Payer: MEDICAID

## 2024-04-08 NOTE — PROGRESS NOTES
Patient has returned call to clinic, patient reports that her symptoms are getting better and she is going to continue to drink plenty of water.

## 2024-05-28 ENCOUNTER — DOCUMENTATION ONLY (OUTPATIENT)
Dept: INTERNAL MEDICINE | Facility: CLINIC | Age: 64
End: 2024-05-28
Payer: MEDICAID

## 2024-08-21 ENCOUNTER — OFFICE VISIT (OUTPATIENT)
Dept: URGENT CARE | Facility: CLINIC | Age: 64
End: 2024-08-21
Payer: MEDICAID

## 2024-08-21 VITALS
RESPIRATION RATE: 18 BRPM | WEIGHT: 223 LBS | SYSTOLIC BLOOD PRESSURE: 126 MMHG | DIASTOLIC BLOOD PRESSURE: 75 MMHG | BODY MASS INDEX: 39.51 KG/M2 | HEART RATE: 86 BPM | OXYGEN SATURATION: 97 % | TEMPERATURE: 98 F | HEIGHT: 63 IN

## 2024-08-21 DIAGNOSIS — J02.9 SORE THROAT: ICD-10-CM

## 2024-08-21 DIAGNOSIS — J32.1 FRONTAL SINUSITIS, UNSPECIFIED CHRONICITY: Primary | ICD-10-CM

## 2024-08-21 LAB
CTP QC/QA: YES
MOLECULAR STREP A: NEGATIVE

## 2024-08-21 PROCEDURE — 99214 OFFICE O/P EST MOD 30 MIN: CPT | Mod: PBBFAC | Performed by: NURSE PRACTITIONER

## 2024-08-21 PROCEDURE — 99203 OFFICE O/P NEW LOW 30 MIN: CPT | Mod: S$PBB,,, | Performed by: NURSE PRACTITIONER

## 2024-08-21 PROCEDURE — 87651 STREP A DNA AMP PROBE: CPT | Mod: PBBFAC | Performed by: NURSE PRACTITIONER

## 2024-08-21 RX ORDER — PREDNISONE 20 MG/1
20 TABLET ORAL 2 TIMES DAILY
Qty: 10 TABLET | Refills: 0 | Status: SHIPPED | OUTPATIENT
Start: 2024-08-21 | End: 2024-08-26

## 2024-08-21 RX ORDER — FLUTICASONE PROPIONATE 50 MCG
2 SPRAY, SUSPENSION (ML) NASAL DAILY
Qty: 18.2 ML | Refills: 0 | Status: SHIPPED | OUTPATIENT
Start: 2024-08-21

## 2024-08-21 NOTE — PATIENT INSTRUCTIONS
Please follow instructions on patient education material.      Return to urgent care in 2 to 3 days if symptoms are not improving, immediately if you develop any new or worsening symptoms.   - Nasal saline lavage  -Warm salt water gargles to your throat  - OTC cold/flu products as desired for symptoms  - Plenty of fluids  - Humidified air  - Tylenol or Motrin for pain/fever    Go to the ER if you experience chest pain with shortness of breath, shortness of breath when moving around your house, high fevers 103.0+, excessive vomiting/diarrhea, or general distress.

## 2024-08-21 NOTE — PROGRESS NOTES
"Subjective:      Patient ID: Alma Rosa Disla is a 64 y.o. female.    Vitals:  height is 5' 2.99" (1.6 m) and weight is 101.2 kg (223 lb). Her temperature is 98.4 °F (36.9 °C). Her blood pressure is 126/75 and her pulse is 86. Her respiration is 18 and oxygen saturation is 97%.     Chief Complaint: URI (Sore throat and ear discomfort x1day.  cough congestion and runny nose x2 weeks. No fever)    URI      As stated in chief complaint.  Patient reports taking over-the-counter medications such as NyQuil and Mucinex without any relief of nasal congestion.  Patient denies any fever, visual disturbances chest pain or shortness for breath, dizziness, weakness, stomach pain, nausea or vomiting  ROS   Objective:     Physical Exam   Constitutional: She is oriented to person, place, and time. She appears well-developed. She is cooperative.  Non-toxic appearance. She does not appear ill. No distress. awake  HENT:   Head: Normocephalic and atraumatic.   Ears:   Right Ear: Tympanic membrane normal. No swelling or tenderness. Tympanic membrane is not injected, not erythematous, not retracted and not bulging. No middle ear effusion.   Left Ear: No swelling or tenderness. Tympanic membrane is not injected, not erythematous, not retracted and not bulging.  No middle ear effusion.   Nose: Rhinorrhea and congestion present. No purulent discharge. Right sinus exhibits frontal sinus tenderness. Right sinus exhibits no maxillary sinus tenderness. Left sinus exhibits frontal sinus tenderness. Left sinus exhibits no maxillary sinus tenderness.   Mouth/Throat: Uvula is midline and mucous membranes are normal. Mucous membranes are moist. Cobblestoning present. No oropharyngeal exudate or posterior oropharyngeal erythema.   Eyes: Conjunctivae are normal. Right eye exhibits no discharge. Left eye exhibits no discharge. Extraocular movement intact   Neck: Neck supple. No neck rigidity present.   Cardiovascular: Regular rhythm. "   Pulmonary/Chest: Effort normal and breath sounds normal. No respiratory distress. She has no wheezes. She has no rhonchi. She has no rales.   Abdominal: Normal appearance.   Lymphadenopathy:     She has no cervical adenopathy.   Neurological: She is alert and oriented to person, place, and time.   Skin: Skin is warm, dry and not diaphoretic. Capillary refill takes less than 2 seconds.   Psychiatric: Her behavior is normal. Mood, judgment and thought content normal.   Nursing note and vitals reviewed.      Assessment:     1. Frontal sinusitis, unspecified chronicity    2. Sore throat      Results for orders placed or performed in visit on 08/21/24   POCT Strep A, Molecular   Result Value Ref Range    Molecular Strep A, POC Negative Negative     Acceptable Yes          Plan:   ER precautions given and discussed  - Nasal saline lavage  -Warm salt water gargles to your throat  - OTC cold/flu products as desired for symptoms  - Plenty of fluids  - Humidified air  - Tylenol or Motrin for pain/fever    Frontal sinusitis, unspecified chronicity  -     predniSONE (DELTASONE) 20 MG tablet; Take 1 tablet (20 mg total) by mouth 2 (two) times daily. for 5 days  Dispense: 10 tablet; Refill: 0  -     fluticasone propionate (FLONASE) 50 mcg/actuation nasal spray; 2 sprays (100 mcg total) by Each Nostril route once daily.  Dispense: 18.2 mL; Refill: 0    Sore throat  -     POCT Strep A, Molecular  -     fluticasone propionate (FLONASE) 50 mcg/actuation nasal spray; 2 sprays (100 mcg total) by Each Nostril route once daily.  Dispense: 18.2 mL; Refill: 0

## 2024-12-10 NOTE — PROGRESS NOTES
ANGI Barakat   [facility]  [address] [facility] [address]      PATIENT NAME: Alma Rosa Disla  : 1960  DATE: 24  MRN: [id number]      Patient PCP Information       None on File            Reason for Visit / Chief Complaint: Hyperlipidemia       History of Present Illness / Problem Focused Workflow     Alma Rosa Disla presents to the clinic with Hyperlipidemia     64 y.o. White female presents to the clinic. Medical problems include HLD, sensorineural hearing loss, obesity, and psoriatic arthritis (formerly seeing Dr. Baum). Followed by Dr. Cornelio Blackwood, GYN.      24  Pt presents for over due follow up, wellness labs . She is agreeable to mammogram order. Last year abn mammogram and left diagnostic recommended which she did not complete. She reports it has happened before and it was scar tissue. No acute complaints, RTC 3 months for wellness.          Review of Systems     Review of Systems   Constitutional:  Negative for fatigue, fever and unexpected weight change.   HENT:  Negative for ear pain, hearing loss, trouble swallowing and voice change.    Respiratory:  Negative for cough and shortness of breath.    Cardiovascular:  Negative for chest pain and palpitations.   Gastrointestinal:  Negative for abdominal pain, diarrhea and vomiting.   Genitourinary:  Negative for dysuria.   Musculoskeletal:  Negative for gait problem.   Skin:  Negative for rash and wound.   Neurological:  Negative for weakness.   Psychiatric/Behavioral:  Negative for suicidal ideas.          Medications and Allergies     Medications  Current Outpatient Medications   Medication Instructions    cholecalciferol, vitamin D3, 325 mcg (13,000 unit) Cap capsule 2 times daily    fluticasone propionate (FLONASE) 100 mcg, Each Nostril, Daily    herbal drugs Tab Take by mouth. Over the counter herbal supplement    multivitamin capsule 1 capsule,  "Daily    omega-3 acid ethyl esters (LOVAZA) 2 g, 2 times daily    THYROID ORAL Take by mouth. Over the counter herbal supplement         Allergies  Review of patient's allergies indicates:  No Known Allergies    Physical Examination     Visit Vitals  /78 (BP Location: Right arm, Patient Position: Sitting)   Pulse 78   Temp 97.5 °F (36.4 °C) (Oral)   Resp 16   Ht 5' 2.99" (1.6 m)   Wt 95.2 kg (209 lb 14.4 oz)   SpO2 99%   BMI 37.19 kg/m²       Physical Exam  Vitals and nursing note reviewed.   Constitutional:       General: She is not in acute distress.     Appearance: She is not ill-appearing.   HENT:      Head: Normocephalic and atraumatic.      Mouth/Throat:      Mouth: Mucous membranes are moist.      Pharynx: Oropharynx is clear.   Eyes:      General: No scleral icterus.     Extraocular Movements: Extraocular movements intact.      Conjunctiva/sclera: Conjunctivae normal.      Pupils: Pupils are equal, round, and reactive to light.   Neck:      Vascular: No carotid bruit.   Cardiovascular:      Rate and Rhythm: Normal rate and regular rhythm.      Heart sounds: No murmur heard.     No friction rub. No gallop.   Pulmonary:      Effort: Pulmonary effort is normal. No respiratory distress.      Breath sounds: Normal breath sounds. No wheezing, rhonchi or rales.   Abdominal:      General: Abdomen is flat. Bowel sounds are normal. There is no distension.      Palpations: Abdomen is soft. There is no mass.      Tenderness: There is no abdominal tenderness.   Musculoskeletal:         General: Normal range of motion.      Cervical back: Normal range of motion and neck supple.   Skin:     General: Skin is warm and dry.   Neurological:      General: No focal deficit present.      Mental Status: She is alert.   Psychiatric:         Mood and Affect: Mood normal.           Results     Lab Results   Component Value Date    WBC 5.34 07/24/2023    RBC 4.62 07/24/2023    HGB 14.1 07/24/2023    HCT 43.5 07/24/2023    MCV " 94.2 (H) 07/24/2023    MCH 30.5 07/24/2023    MCHC 32.4 (L) 07/24/2023    RDW 12.6 07/24/2023     07/24/2023    MPV 11.2 (H) 07/24/2023     CMP  Sodium   Date Value Ref Range Status   07/24/2023 143 136 - 145 mmol/L Final     Potassium   Date Value Ref Range Status   07/24/2023 4.1 3.5 - 5.1 mmol/L Final     Chloride   Date Value Ref Range Status   07/24/2023 107 98 - 107 mmol/L Final     CO2   Date Value Ref Range Status   07/24/2023 31 23 - 31 mmol/L Final     Blood Urea Nitrogen   Date Value Ref Range Status   07/24/2023 14.5 9.8 - 20.1 mg/dL Final     Creatinine   Date Value Ref Range Status   07/24/2023 0.83 0.55 - 1.02 mg/dL Final     Calcium   Date Value Ref Range Status   07/24/2023 9.8 8.4 - 10.2 mg/dL Final     Albumin   Date Value Ref Range Status   07/24/2023 4.0 3.4 - 4.8 g/dL Final     Bilirubin Total   Date Value Ref Range Status   07/24/2023 0.4 <=1.5 mg/dL Final     ALP   Date Value Ref Range Status   07/24/2023 129 40 - 150 unit/L Final     AST   Date Value Ref Range Status   07/24/2023 19 5 - 34 unit/L Final     ALT   Date Value Ref Range Status   07/24/2023 23 0 - 55 unit/L Final     Lab Results   Component Value Date    CHOL 220 (H) 02/26/2024     Lab Results   Component Value Date    HDL 69 (H) 02/26/2024     Lab Results   Component Value Date    TRIG 104 02/26/2024     Lab Results   Component Value Date    VLDL 21 02/26/2024     Lab Results   Component Value Date    .00 02/26/2024     Lab Results   Component Value Date    TSH 4.392 07/24/2023         Assessment        ICD-10-CM ICD-9-CM   1. Mixed hyperlipidemia  E78.2 272.2   2. Breast cancer screening by mammogram  Z12.31 V76.12   3. Visual changes  H53.9 368.9   4. Well adult exam  Z00.00 V70.0        Plan      Problem List Items Addressed This Visit       Mixed hyperlipidemia - Primary (Chronic)    Current Assessment & Plan     FLP ordered  Stressed importance of dietary modifications. Follow a low cholesterol, low saturated  fat diet with less that 200mg of cholesterol a day.  Avoid fried foods and high saturated fats (high saturated fats less than 7% of calories).  Add Flax Seed/Fish Oil supplements to diet. Increase dietary fiber.  Regular exercise can reduce LDL and raise HDL. Stressed importance of physical activity 5 times per week for 30 minutes per day.            Visual changes    Current Assessment & Plan     Pt feels vision is not as sharp as it used to be  Previously would go to [facility]           Relevant Orders    Ambulatory referral/consult to Ophthalmology     Other Visit Diagnoses       Breast cancer screening by mammogram        Relevant Orders    Mammo Digital Screening Bilat    Well adult exam        Relevant Orders    CBC Auto Differential    Comprehensive Metabolic Panel    Hemoglobin A1C    Lipid Panel    TSH    T4, Free    Vitamin D    Urinalysis, Reflex to Urine Culture            Future Appointments   Date Time Provider Department Center   7/31/2025  3:15 PM Shilpa Vela NP [facility]        No follow-ups on file.      Signature:      [facility]  [address] [facility] [address]    Date of encounter: 12/12/24

## 2024-12-12 ENCOUNTER — OFFICE VISIT (OUTPATIENT)
Dept: INTERNAL MEDICINE | Facility: CLINIC | Age: 64
End: 2024-12-12
Payer: MEDICAID

## 2024-12-12 VITALS
HEIGHT: 63 IN | BODY MASS INDEX: 37.19 KG/M2 | HEART RATE: 78 BPM | DIASTOLIC BLOOD PRESSURE: 78 MMHG | RESPIRATION RATE: 16 BRPM | TEMPERATURE: 98 F | WEIGHT: 209.88 LBS | SYSTOLIC BLOOD PRESSURE: 117 MMHG | OXYGEN SATURATION: 99 %

## 2024-12-12 DIAGNOSIS — Z00.00 WELL ADULT EXAM: ICD-10-CM

## 2024-12-12 DIAGNOSIS — Z12.31 BREAST CANCER SCREENING BY MAMMOGRAM: ICD-10-CM

## 2024-12-12 DIAGNOSIS — H53.9 VISUAL CHANGES: ICD-10-CM

## 2024-12-12 DIAGNOSIS — E78.2 MIXED HYPERLIPIDEMIA: Primary | Chronic | ICD-10-CM

## 2024-12-12 PROBLEM — E66.812 CLASS 2 OBESITY DUE TO EXCESS CALORIES WITHOUT SERIOUS COMORBIDITY WITH BODY MASS INDEX (BMI) OF 36.0 TO 36.9 IN ADULT: Chronic | Status: RESOLVED | Noted: 2023-08-24 | Resolved: 2024-12-12

## 2024-12-12 PROBLEM — Z23 NEED FOR VACCINATION: Status: RESOLVED | Noted: 2022-09-23 | Resolved: 2024-12-12

## 2024-12-12 PROBLEM — E66.09 CLASS 2 OBESITY DUE TO EXCESS CALORIES WITHOUT SERIOUS COMORBIDITY WITH BODY MASS INDEX (BMI) OF 36.0 TO 36.9 IN ADULT: Chronic | Status: RESOLVED | Noted: 2023-08-24 | Resolved: 2024-12-12

## 2024-12-12 PROCEDURE — 99214 OFFICE O/P EST MOD 30 MIN: CPT | Mod: PBBFAC

## 2024-12-12 NOTE — ASSESSMENT & PLAN NOTE
FLP ordered  Stressed importance of dietary modifications. Follow a low cholesterol, low saturated fat diet with less that 200mg of cholesterol a day.  Avoid fried foods and high saturated fats (high saturated fats less than 7% of calories).  Add Flax Seed/Fish Oil supplements to diet. Increase dietary fiber.  Regular exercise can reduce LDL and raise HDL. Stressed importance of physical activity 5 times per week for 30 minutes per day.

## 2025-01-14 ENCOUNTER — HOSPITAL ENCOUNTER (OUTPATIENT)
Dept: RADIOLOGY | Facility: HOSPITAL | Age: 65
Discharge: HOME | End: 2025-01-14
Payer: MEDICAID

## 2025-01-14 DIAGNOSIS — Z12.31 BREAST CANCER SCREENING BY MAMMOGRAM: ICD-10-CM

## 2025-01-27 ENCOUNTER — HOSPITAL ENCOUNTER (OUTPATIENT)
Dept: RADIOLOGY | Facility: HOSPITAL | Age: 65
Discharge: HOME | End: 2025-01-27
Payer: MEDICAID

## 2025-01-27 DIAGNOSIS — R92.8 ABNORMAL MAMMOGRAM: Primary | ICD-10-CM

## 2025-01-27 DIAGNOSIS — R92.8 ABNORMAL MAMMOGRAM: ICD-10-CM

## 2025-01-27 PROCEDURE — 76642 ULTRASOUND BREAST LIMITED: CPT | Mod: TC,LT

## 2025-01-27 PROCEDURE — 77062 BREAST TOMOSYNTHESIS BI: CPT | Mod: 26,,, | Performed by: RADIOLOGY

## 2025-01-27 PROCEDURE — 77062 BREAST TOMOSYNTHESIS BI: CPT | Mod: TC

## 2025-01-27 PROCEDURE — 19083 BX BREAST 1ST LESION US IMAG: CPT | Mod: LT

## 2025-01-27 PROCEDURE — 77065 DX MAMMO INCL CAD UNI: CPT | Mod: TC,LT

## 2025-01-27 PROCEDURE — 77066 DX MAMMO INCL CAD BI: CPT | Mod: 26,,, | Performed by: RADIOLOGY

## 2025-01-27 PROCEDURE — 88305 TISSUE EXAM BY PATHOLOGIST: CPT | Mod: TC

## 2025-01-27 PROCEDURE — 76642 ULTRASOUND BREAST LIMITED: CPT | Mod: 26,LT,, | Performed by: RADIOLOGY

## 2025-01-28 ENCOUNTER — TELEPHONE (OUTPATIENT)
Dept: RADIOLOGY | Facility: HOSPITAL | Age: 65
End: 2025-01-28
Payer: MEDICAID

## 2025-01-28 NOTE — PROGRESS NOTES
Doing well.  No pain or bleeding.  Took dressing off last night but replaced with another one.  Back at work today.

## 2025-01-30 DIAGNOSIS — C50.912 INVASIVE DUCTAL CARCINOMA OF BREAST, FEMALE, LEFT: Primary | ICD-10-CM

## 2025-01-30 NOTE — PROGRESS NOTES
By phone, I notified Ms St of left breast biopsy results (invasive ductal carcinoma) and appointment with [facility] on Thursday 2/13/25 140pm per Dr. Librado Page.  All questions were answered and Ms St verbalized understanding of the results.

## 2025-01-30 NOTE — PROGRESS NOTES
Referral to [facility] for new diagnosis of left breast invasive ductal carcinoma

## 2025-02-03 LAB
DHEA SERPL-MCNC: NORMAL
ESTRIOL SERPL-MCNC: NORMAL NG/ML
ESTROGEN SERPL-MCNC: NORMAL PG/ML
INSULIN SERPL-ACNC: NORMAL U[IU]/ML
LAB AP CLINICAL INFORMATION: NORMAL
LAB AP GROSS DESCRIPTION: NORMAL
LAB AP REPORT FOOTNOTES: NORMAL

## 2025-02-13 ENCOUNTER — OFFICE VISIT (OUTPATIENT)
Dept: SURGERY | Facility: CLINIC | Age: 65
End: 2025-02-13
Payer: MEDICAID

## 2025-02-13 VITALS
RESPIRATION RATE: 20 BRPM | HEART RATE: 95 BPM | SYSTOLIC BLOOD PRESSURE: 139 MMHG | DIASTOLIC BLOOD PRESSURE: 81 MMHG | HEIGHT: 63 IN | WEIGHT: 222.38 LBS | TEMPERATURE: 98 F | OXYGEN SATURATION: 98 % | BODY MASS INDEX: 39.4 KG/M2

## 2025-02-13 DIAGNOSIS — C50.912 INVASIVE DUCTAL CARCINOMA OF BREAST, FEMALE, LEFT: ICD-10-CM

## 2025-02-13 PROCEDURE — 99215 OFFICE O/P EST HI 40 MIN: CPT | Mod: PBBFAC

## 2025-02-13 NOTE — PROGRESS NOTES
[facility] General Surgery Clinic Note    HPI:     Ms. 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 Today, she reports having a palpable L breast mass since her biopsy on 1/27/25 that is associated with pain/soreness with palpation. Otherwise, she denies skin changes on the breast, nipple discharge, and nipple retraction.  Menarche was at 13 and her last menstrual period was at 60 years old, and she denies menopausal symptoms except for irritability for the past 3-4 years. She has never taken hormone therapy and may have taken an OCP (last time used 36 years old). She has been pregnant four times, with two live births and two miscarriages. She  her second child. All other ROS is negative as below.  Patient denies any history of cardiac or lung disease, she does not smoke drink or use drugs.  Denies history of hypertension diabetes.    PMH:   Past Medical History:   Diagnosis Date    Hyperlipidemia     Menopause 4/27/20020      Meds:   Current Outpatient Medications:     cholecalciferol, vitamin D3, 325 mcg (13,000 unit) Cap capsule, Take by mouth 2 (two) times a day., Disp: , Rfl:     fluticasone propionate (FLONASE) 50 mcg/actuation nasal spray, 2 sprays (100 mcg total) by Each Nostril route once daily., Disp: 18.2 mL, Rfl: 0    herbal drugs Tab, Take by mouth. Over the counter herbal supplement, Disp: , Rfl:     multivitamin capsule, Take 1 capsule by mouth once daily., Disp: , Rfl:     omega-3 acid ethyl esters (LOVAZA) 1 gram capsule, Take 2 g by mouth 2 (two) times daily., Disp: , Rfl:     THYROID ORAL, Take by mouth. Over the counter herbal supplement, Disp: , Rfl:   Allergies: Review of patient's allergies indicates:  No Known Allergies  Social History:   Social History     Tobacco Use    Smoking status: Never     Passive exposure: Never    Smokeless tobacco: Never   Substance Use Topics     Alcohol use: Not Currently     Comment: I ONLY WINE MAYBE TWICE year    Drug use: Never     Family History:   Family History   Problem Relation Name Age of Onset    Heart disease Mother Zari Vinay     Diabetes Mother Zari Vinay     Alzheimer's disease Mother Zari Vinay     Stroke Mother Zari Vinay     Cancer Father HarrisEarl         lung    Heart failure Father HarrisEarl     Cancer Sister Rakesh         ovarian    Diabetes Sister Rakesh     Rashes / Skin problems Sister Rakesh     Diabetes Brother      Epilepsy Brother      Heart failure Maternal Grandmother Mary     Osteoarthritis Maternal Aunt Valentina     Cancer Sister Jeannine Jolley     Diabetes Sister Jeannine Jolley     Ovarian cancer Sister Jeannine Jolley     Diabetes Brother DOREEN FRAUSTO     Rashes / Skin problems Brother DOREEN FRAUSTO     Seizures Brother DOREEN FRAUSTO     Stroke Brother DOREEN FRAUSTO      Surgical History:   Past Surgical History:   Procedure Laterality Date    ADENOIDECTOMY      CHOLECYSTECTOMY      COLONOSCOPY      with biopsy    TONSILLECTOMY       Review of Systems:  As per HPI    Objective:    Vitals:  Vitals:    02/13/25 1418   BP: 139/81   Pulse: 95   Resp: 20   Temp: 97.9 °F (36.6 °C)        Physical Exam:  Gen: NAD  Neuro: Awake, alert, answering questions appropriately  CV: RRR  Resp: Non-labored breathing, LEESA  Abd: Soft, ND, NT  : Deferred  Ext: Moves all 4 spontaneously and purposefully  Skin: Warm, well perfused  Breast: No nipple inversion, discharge, skin changes, edema bilaterally, L breast palpable 2 cm mass in UOQ, R breast without prominent masses     Pertinent Labs:  CBC, CMP, Hemoglobin A1c ordered    Imaging:  Left US Breast Biopsy and Diagnostic Mammo (1/27/25)  Impression: Malignant  - Ultrasound guided biopsy of the mass in the 2:00 left breast, 7 cm from the nipple, with placement of a T1 barrel shaped clip was successful with no apparent post procedure complications.    - Pathology indicates malignant  invasive ductal carcinoma, grade 3. Pathology results are malignant and are concordant with the imaging findings.  BIRADS-6    All previous mammogram results are available in the media tab.     Micro/Path/Other:  Left Breast Mass (1/27/25)  - Location: 2:00, 7 cm from the nipple  - Invasive ductal carcinoma (grade 3, ER-, MO-, HER2-, Ki-67 high proliferation)    Assessment/Plan:  Ms. Disla is a 64-year-old female with a past medical history of HLD who presents to clinic today to establish care for invasive ductal carcinoma. By US-guided L breast biopsy, invasive ductal carcinoma (grade 3, ER-, MO-, HER2-, Ki-67 high proliferation) of the L breast. Pt with mass and pain to palpation since biopsy but otherwise asymptomatic.  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  T2 lesion would likely benefit from neoadjuvant chemotherapy, will refer to [facility].  If patient is in need of MediPort for neoadjuvant chemotherapy, please refer back to breast surgery and we will gladly place the port.    - referral placed to Dr. Reyes, heme Onc  - will place a MediPort if needed  - please refer back to breast surgery after neoadjuvant therapy is completed to discuss surgical options    Lou Snyder, MS3  [facility]    I have seen and examined the patient with the medical student. I agree with the assessment and plan and have made the appropriate edits to the note above.     Duarte Rodney MD  \[facility] [place] [facility]\"" General Surgery, PGY-1

## 2025-02-14 NOTE — PROGRESS NOTES
I have reviewed the notes, assessments, and/or procedures performed by the resident, I concur with her/his documentation of Alma Rosa Disla.     Newly diagnosed T2 triple negative breast cancer. Will refer to medical oncology for consideration of neoadjuvant chemotherapy.   Breast surgery would be happy to place mediport if neoadjuvant chemotherapy is agreed upon.     Farideh Noguera MD

## 2025-02-21 ENCOUNTER — TELEPHONE (OUTPATIENT)
Dept: HEMATOLOGY/ONCOLOGY | Facility: CLINIC | Age: 65
End: 2025-02-21
Payer: MEDICAID

## 2025-02-22 PROBLEM — Z80.41 FAMILY HISTORY OF OVARIAN CANCER: Status: ACTIVE | Noted: 2025-02-22

## 2025-02-22 PROBLEM — Z78.0 POSTMENOPAUSAL: Status: ACTIVE | Noted: 2025-02-22

## 2025-02-22 PROBLEM — Z80.1 FAMILY HISTORY OF LUNG CANCER: Status: ACTIVE | Noted: 2025-02-22

## 2025-02-22 PROBLEM — C50.919 TRIPLE NEGATIVE BREAST CANCER: Status: ACTIVE | Noted: 2025-02-22

## 2025-02-22 PROBLEM — E66.9 OBESITY (BMI 35.0-39.9 WITHOUT COMORBIDITY): Status: ACTIVE | Noted: 2025-02-22

## 2025-02-22 PROBLEM — C50.912 INVASIVE DUCTAL CARCINOMA OF LEFT BREAST: Status: ACTIVE | Noted: 2025-02-22

## 2025-02-22 PROBLEM — Z17.421 TRIPLE NEGATIVE BREAST CANCER: Status: ACTIVE | Noted: 2025-02-22

## 2025-02-22 NOTE — PROGRESS NOTES
History:  Past Medical History:   Diagnosis Date    Hyperlipidemia     Menopause 4/27/20020     Past Surgical History:   Procedure Laterality Date    ADENOIDECTOMY      CHOLECYSTECTOMY  1998    COLONOSCOPY      with biopsy    TONSILLECTOMY  1975      Social History     Socioeconomic History    Marital status:    Tobacco Use    Smoking status: Never     Passive exposure: Never    Smokeless tobacco: Never   Substance and Sexual Activity    Alcohol use: Not Currently     Comment: I ONLY WINE MAYBE TWICE year    Drug use: Never    Sexual activity: Not Currently     Partners: Male     Social Drivers of Health     Financial Resource Strain: Low Risk  (12/5/2024)    Overall Financial Resource Strain (CARDIA)     Difficulty of Paying Living Expenses: Not very hard   Food Insecurity: No Food Insecurity (12/5/2024)    Hunger Vital Sign     Worried About Running Out of Food in the Last Year: Never true     Ran Out of Food in the Last Year: Never true   Transportation Needs: No Transportation Needs (3/20/2024)    PRAPARE - Transportation     Lack of Transportation (Medical): No     Lack of Transportation (Non-Medical): No   Physical Activity: Unknown (12/5/2024)    Exercise Vital Sign     Days of Exercise per Week: 7 days   Stress: No Stress Concern Present (12/5/2024)    [employer] [place] [employer] - Occupational Stress Questionnaire     Feeling of Stress : Only a little   Housing Stability: Unknown (3/20/2024)    Housing Stability Vital Sign     Unable to Pay for Housing in the Last Year: No     Unstable Housing in the Last Year: No      Family History   Problem Relation Name Age of Onset    Heart disease Mother Zari Mclean     Diabetes Mother Zari Mclean     Alzheimer's disease Mother Zari Mclean     Stroke Mother Zari Mclean     Arthritis Mother Zari Mclean     Cancer Father LoboEarjacob         Lung cancer    Heart failure Father LoboEarjacob     Cancer Sister Rakesh         ovarian    Diabetes Sister  Rakesh     Rashes / Skin problems Sister Rakesh     Diabetes Brother Rakesh SLETTY     Epilepsy Brother Rakesh SLETTY     Heart failure Maternal Grandmother Mary     Diabetes Maternal Grandmother Mary     Osteoarthritis Maternal Aunt Valentina     Cancer Sister Jeannine Jolley         Ovarian  cancer    Diabetes Sister Jeannine Jolley     Ovarian cancer Sister Jeannine Jolley     Diabetes Brother DOREEN MCLEAN     Rashes / Skin problems Brother DOREEN MCLEAN     Seizures Brother DOREEN MCLEAN     Stroke Brother DOREEN MCLEAN     Arthritis Brother Brando Mclean     Diabetes Brother DOREEN MCLEAN     Rashes / Skin problems Brother DOREEN MCLEAN     Seizures Brother DOREEN MCLEAN     Stroke Brother DOREEN MCLEAN         Reason for Follow-up:  Reason for consultation:   -IDC left breast, biopsy 2025, triple negative, overall grade 3; cT2 cN0 MX, AJCC anatomic stage at least IIA, clinical prognostic stage at least IIB  -postmenopausal  -father experienced lung cancer; sister experienced ovarian cancer  -obesity    History of Present Illness:   Invasive ductal carcinoma        Oncologic/Hematologic History:  Oncology History   Invasive ductal carcinoma of left breast   2025 Cancer Staged    Staging form: Breast, AJCC 8th Edition  - Clinical stage from 2025: Stage IIB (cT2, cN0, cM0, G3, ER-, KS-, HER2-)     2025 Initial Diagnosis    Invasive ductal carcinoma of left breast     Past medical history: Dyslipidemia; postmenopausal; sensorineural hearing loss; obesity; psoriatic arthritis; JENNIFER positive; bilateral tinnitus  Procedure/surgical history: Adenoidectomy; cholecystectomy; colonoscopy; tonsillectomy    -2025:  Says that she underwent colonoscopy with [facility] 3 years ago, and that it showed 1 polyp; apparently, the recommended repeat colonoscopy in 5 years  Social history:  .  Lives in [place].  Has 1 child.  A 41-year-old son  from complications of diabetes mellitus.  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  She smoked <1 ppd x2 years; quit 40 years ago.  No alcohol or illicit drug abuse.  Family history: Father experienced lung cancer at age 84 (was a smoker); sister experienced ovarian cancer at age 54; maternal aunt experienced unknown kind of cancer in her 60s  Health maintenance:   -07/13/2023:  Thin prep cervical Pap smear:  NILM, high-risk HPV negative  -02/24/2025:  Says that she underwent colonoscopy with [facility] 3 years ago, and that it showed 1 polyp; apparently, the recommended repeat colonoscopy in 5 years  Menstrual/Ob gyn history: Menarche at age 13; LMP at age 60; no menopausal symptoms except for irritability x3-4 years; never took hormone replacement therapy; last OCP use at age 36 (used OCPs for 3 years); for pregnancies; 2 live births; 2 miscarriages;  her 2nd child for 6 months; 1st child at age 22      64-year-old lady, referred from [facility] breast [facility], with invasive ductal carcinoma.    Investigations reviewed:  -12/13/2023:  Bilateral screening mammogram (comparison:  10/23/2013 mammogram):  BI-RADS category 0-incomplete  -01/27/2025:  bilateral diagnostic mammogram, limited ultrasound left breast:  HISTORY: 64-year-old female presents for further evaluation of a mass in the upper-outer quadrant of the left breast recalled from screening at [facility] on 12/13/2023.    Overall study BI-RADS: 5-highly suggestive of malignancy (per mammogram, up to 23 mm mass; per ultrasound, 23 x 17 x 22 mm mass)  -01/27/2025:  left breast mass 2 o'clock, 7 cm from nipple, ultrasound-guided needle core biopsy:  IDC, overall grade 3  -ER negative (0%), UT negative (0%), HER2 negative (score 0), Ki-67 high proliferation (65%)    02/24/2025:    Pleasant, healthy-appearing lady who presents for initial medical oncology consultation, accompanied by .  In no acute discomfort.    Overall, feels well and healthy.  Great appetite.  Never noticed any breast lumps, breast  pain, nipple discharge, skin or nipple changes.  Never noticed regional lymphadenopathy.    No anorexia, unintentional weight loss, weakness, fatigue, unusual headaches, focal neurological symptoms, chest pain, cough, dyspnea, abdominal pain, nausea, vomiting, GI bleeding, change in bowel habits, bone pains, any urinary problems, postmenopausal spotting, etc..  ECOG 0.    Interval History:  [No matching plan found]   [No matching plan found]       Allergies as of 02/24/2025    (No Known Allergies)     Medications:  Medications Ordered Prior to Encounter[1]    Review of Systems:   All systems reviewed and found to be negative except for the symptoms detailed above    Physical Examination:   VITAL SIGNS:   Vitals:    02/24/25 1020   BP: 130/74   Pulse: 99   Resp: 18   Temp: 98 °F (36.7 °C)     GENERAL:  In no apparent distress.    HEAD:  No signs of head trauma.  EYES:  Pupils are equal.  Extraocular motions intact.    EARS:  Hearing grossly intact.  MOUTH:  Oropharynx is normal.   NECK:  No adenopathy, no JVD.     CHEST:  Chest with clear breath sounds bilaterally.  No wheezes, rales, rhonchi.    CARDIAC:  Regular rate and rhythm.  S1 and S2, without murmurs, gallops, rubs.  VASCULAR:  No Edema.  Peripheral pulses normal and equal in all extremities.  ABDOMEN:  Soft, without detectable tenderness.  No sign of distention.  No   rebound or guarding, and no masses palpated.   Bowel Sounds normal.  MUSCULOSKELETAL:  Good range of motion of all major joints. Extremities without clubbing, cyanosis or edema.    NEUROLOGIC EXAM:  Alert and oriented x 3.  No focal sensory or strength deficits.   Speech normal.  Follows commands.  PSYCHIATRIC:  Mood normal.    Results for orders placed or performed in visit on 07/24/23   CBC Auto Differential    Narrative    The following orders were created for panel order CBC Auto Differential.  Procedure                               Abnormality         Status                     ---------                                -----------         ------                     CBC with Differential[[id number]]        Abnormal            Final result                 Please view results for these tests on the individual orders.   CBC with Differential   Result Value Ref Range    WBC 5.34 4.50 - 11.50 x10(3)/mcL    RBC 4.62 4.20 - 5.40 x10(6)/mcL    Hgb 14.1 12.0 - 16.0 g/dL    Hct 43.5 37.0 - 47.0 %    MCV 94.2 (H) 80.0 - 94.0 fL    MCH 30.5 27.0 - 31.0 pg    MCHC 32.4 (L) 33.0 - 36.0 g/dL    RDW 12.6 11.5 - 17.0 %    Platelet 254 130 - 400 x10(3)/mcL    MPV 11.2 (H) 7.4 - 10.4 fL    Neut % 60.7 %    Lymph % 30.9 %    Mono % 5.6 %    Eos % 1.5 %    Basophil % 1.1 %    Lymph # 1.65 0.6 - 4.6 x10(3)/mcL    Neut # 3.24 2.1 - 9.2 x10(3)/mcL    Mono # 0.30 0.1 - 1.3 x10(3)/mcL    Eos # 0.08 0 - 0.9 x10(3)/mcL    Baso # 0.06 <=0.2 x10(3)/mcL    Imm Gran # 0.01 0 - 0.04 x10(3)/mcL    Imm Grans % 0.2 %    NRBC% 0.0 %     Results for orders placed or performed in visit on 07/24/23   Comprehensive Metabolic Panel   Result Value Ref Range    Sodium 143 136 - 145 mmol/L    Potassium 4.1 3.5 - 5.1 mmol/L    Chloride 107 98 - 107 mmol/L    CO2 31 23 - 31 mmol/L    Glucose 92 82 - 115 mg/dL    Blood Urea Nitrogen 14.5 9.8 - 20.1 mg/dL    Creatinine 0.83 0.55 - 1.02 mg/dL    Calcium 9.8 8.4 - 10.2 mg/dL    Protein Total 7.8 (H) 5.8 - 7.6 gm/dL    Albumin 4.0 3.4 - 4.8 g/dL    Globulin 3.8 (H) 2.4 - 3.5 gm/dL    Albumin/Globulin Ratio 1.1 1.1 - 2.0 ratio    Bilirubin Total 0.4 <=1.5 mg/dL     40 - 150 unit/L    ALT 23 0 - 55 unit/L    AST 19 5 - 34 unit/L    eGFR >60 mls/min/1.73/m2       Assessment:  Problem List Items Addressed This Visit       Triple negative breast cancer - Primary    Relevant Orders    Ambulatory Referral/Consult to Oncology Social Work    Ambulatory Referral/Consult to Hematology Oncology Behavioral Health    Invasive ductal carcinoma of left breast    Postmenopausal    Obesity (BMI 35.0-39.9 without  comorbidity)    Family history of ovarian cancer    Family history of lung cancer     Other Visit Diagnoses         Invasive ductal carcinoma of breast, female, left                Orders for 04/20/2025:   Genetic testing and counseling   Stage with contrast-enhanced CT scans of C/A/P and whole-body nuclear medicine bone scan at this time   Breast MRI with contrast  Assess LVEF with echocardiogram at this time  Refer to breast clinic for MediPort placement  Follow-up in 2 weeks, with results of requested investigations.    Above discussed at length with the patient.  All questions answered.  Discussed labs and scans and gave her copies of relevant results.  Pathology report discussed.  Gave her a copy.  Breast cancer staging discussed.  Plan of management discussed in detail.    She understands and agrees plan.  =============================    #IDC left breast, triple negative:  -screening mammogram 12/13/2023: BI-RADS 0  -diagnostic mammogram and ultrasound 01/27/2025:  BI-RADS: 5  -core biopsy 01/27/2025:  IDC, overall grade 3, triple negative  -ER negative (0%), AL negative (0%), HER2 negative (score 0), Ki-67 high proliferation (65%)  -radiologically, per mammogram and ultrasound 01/27/2025:  23 x 17 x 22 mm mass; no lymphadenopathy  -cT2 cN0 MX, AJCC anatomic stage at least IIA, clinical prognostic stage at least IIB  -02/24/2025: Initial consultation: ECOG 0  >>>  Plan:  Postmenopausal  Triple negative breast cancers; we will order genetic testing and counseling  Also, family history of ovarian cancer in sister; needs genetic testing  Stage with contrast-enhanced CT scans of C/A/P and whole-body nuclear medicine bone scan rule out metastases  Breast MRI with contrast (maybe useful for characterizing axillary and/or internal mammary merline disease)  By definition, if no metastases, then, patient has operable breast cancer  If no metastases, then, for TNBC, cT2, preoperative systemic therapy we will be  preferred  In any case, long-term outcomes are the same when patients are given chemotherapy preoperatively compared with postoperatively  Assess LVEF with TTE  Refer back breast clinic for MediPort placement for neoadjuvant chemotherapy  We will restage with breast MRI after 4 cycles of chemotherapy, then, after completion of 8 cycles of chemotherapy    Clinical prognostic stage at least IIB, therefore, preoperative followed by adjuvant:    Preoperative pembrolizumab/carboplatin/Taxol, followed by   preoperative pembrolizumab/Cyclophosphamide/doxorubicin or epirubicin, followed by   adjuvant pembrolizumab (category 1 recommendation)  (see below)  -If residual disease after preoperative therapy with taxane/alkylator/anthracycline based chemotherapy, then, capecitabine (patients in the Giacomo trial did not receive capecitabine, therefore, there are no data on sequencing or to guide selection of one agent over the other, i.e., capecitabine versus olaparib if BRCA1/2 mutation positive)  -If germline BRCA1/2 mutation positive, then, adjuvant olaparib if residual disease  (patients in the Giacomo trial did not receive capecitabine, therefore, there are no data on sequencing or to guide selection of one agent over the other)    Preoperative systemic therapy   Pembrolizumab 200 mg IV day 1   Paclitaxel 80 mg/m² IV days 1, 8, 15   Carboplatin AUC 5 IV day 1   (Cycle every 21 days x 4 cycles) (cycles 1-4)   >>   Followed by:   -Pembrolizumab 200 mg IV day 1   -Doxorubicin 60 mg/m² IV day 1   -Cyclophosphamide 600 mg/m² IV day 1   (Cycle every 21 days for 4 cycles (cycles 5-8)   Followed by:  Adjuvant:   Pembrolizumab 200 mg IV day 1   Cycle every 21 days x 9 cycles    Response assessment to preoperative systemic therapy:  Physical examination, mammogram, and/or breast ultrasound and/or breast MRI  MRI is more accurate than mammography for assessing tumor response to preoperative therapy    After preoperative systemic  therapy, if BCS and axillary staging, then:  1. Adjuvant systemic therapy +whole breast radiotherapy    After preoperative systemic therapy, if mastectomy and axillary staging, then:  1. Adjuvant systemic therapy +postmastectomy radiotherapy  -if cN+ and ypN0, then:  Strongly consider PMRT to chest wall and comprehensive RNI with inclusion of any portion of the undetected axilla at risk  -any ypN+: PMRT to chest wall +comprehensive RNI with inclusion of any portion of the undetected axilla at risk  -if cN0, ypN0, then:  If axilla was assessed by SLNB or axillary dissection, then, no PMRT    Adjuvant systemic therapy after preoperative systemic therapy:  -consider adjuvant bisphosphonate therapy for risk reduction of distant metastases for 3-5 years in postmenopausal patients with a high-risk node-negative or node positive tumors; we will consider in this patient's; she will need dental evaluation and preventive Dentistry prior, in order to prevent/minimize likelihood of osteonecrosis of the jaw  1. If ypT0N0 or PCR, then:  Adjuvant pembrolizumab (if pembrolizumab containing regimen was given preoperatively)    2. If ypT1-4, N0 or ypN=/>1, then:  -adjuvant pembrolizumab; and/or  -adjuvant capecitabine (6-8 cycles); and/or  -adjuvant olaparib x1 year if germline BRCA1/2 mutation positive (category 1 recommendation)    #Family history of lung cancer and ovarian cancer  Father experienced lung cancer at age 84 (was a smoker); sister experienced ovarian cancer at age 54; maternal aunt experienced unknown kind of cancer in her 60s    #Obesity:  -12/12/2024: BMI 37.2  -02/13/2025: BMI 39.4    History of dyslipidemia, bilateral tinnitus, sensorineural hearing loss, psoriatic arthritis       Follow-up:  No follow-ups on file.      Answers submitted by the patient for this visit:  Review of Systems Questionnaire (Submitted on 2/17/2025)  appetite change : No  unexpected weight change: No  mouth sores: No  visual disturbance:  No  cough: Yes  shortness of breath: No  chest pain: No  diarrhea: No  frequency: No  back pain: No  rash: No  headaches: No  adenopathy: No  nervous/ anxious: No         [1]   Current Outpatient Medications on File Prior to Visit   Medication Sig Dispense Refill    cholecalciferol, vitamin D3, 325 mcg (13,000 unit) Cap capsule Take by mouth 2 (two) times a day.      herbal drugs Tab Take by mouth. Over the counter herbal supplement      multivitamin capsule Take 1 capsule by mouth once daily.      omega-3 acid ethyl esters (LOVAZA) 1 gram capsule Take 2 g by mouth 2 (two) times daily.      THYROID ORAL Take by mouth. Over the counter herbal supplement      fluticasone propionate (FLONASE) 50 mcg/actuation nasal spray 2 sprays (100 mcg total) by Each Nostril route once daily. 18.2 mL 0     No current facility-administered medications on file prior to visit.

## 2025-02-24 ENCOUNTER — INFUSION (OUTPATIENT)
Dept: INFUSION THERAPY | Facility: HOSPITAL | Age: 65
End: 2025-02-24
Attending: INTERNAL MEDICINE
Payer: MEDICAID

## 2025-02-24 ENCOUNTER — OFFICE VISIT (OUTPATIENT)
Dept: HEMATOLOGY/ONCOLOGY | Facility: CLINIC | Age: 65
End: 2025-02-24
Attending: INTERNAL MEDICINE
Payer: MEDICAID

## 2025-02-24 ENCOUNTER — DOCUMENTATION ONLY (OUTPATIENT)
Dept: HEMATOLOGY/ONCOLOGY | Facility: CLINIC | Age: 65
End: 2025-02-24

## 2025-02-24 VITALS
BODY MASS INDEX: 38.98 KG/M2 | SYSTOLIC BLOOD PRESSURE: 130 MMHG | RESPIRATION RATE: 18 BRPM | WEIGHT: 220 LBS | OXYGEN SATURATION: 96 % | DIASTOLIC BLOOD PRESSURE: 74 MMHG | TEMPERATURE: 98 F | HEART RATE: 99 BPM | HEIGHT: 63 IN

## 2025-02-24 VITALS
TEMPERATURE: 98 F | WEIGHT: 220 LBS | RESPIRATION RATE: 20 BRPM | OXYGEN SATURATION: 96 % | BODY MASS INDEX: 38.98 KG/M2 | HEART RATE: 73 BPM | SYSTOLIC BLOOD PRESSURE: 130 MMHG | HEIGHT: 63 IN | DIASTOLIC BLOOD PRESSURE: 65 MMHG

## 2025-02-24 DIAGNOSIS — C50.912 INVASIVE DUCTAL CARCINOMA OF LEFT BREAST: ICD-10-CM

## 2025-02-24 DIAGNOSIS — Z17.421 TRIPLE NEGATIVE BREAST CANCER: Primary | ICD-10-CM

## 2025-02-24 DIAGNOSIS — C50.912 INVASIVE DUCTAL CARCINOMA OF BREAST, FEMALE, LEFT: ICD-10-CM

## 2025-02-24 DIAGNOSIS — C50.919 TRIPLE NEGATIVE BREAST CANCER: Primary | ICD-10-CM

## 2025-02-24 DIAGNOSIS — Z78.0 POSTMENOPAUSAL: ICD-10-CM

## 2025-02-24 DIAGNOSIS — E66.9 OBESITY (BMI 35.0-39.9 WITHOUT COMORBIDITY): ICD-10-CM

## 2025-02-24 DIAGNOSIS — Z80.41 FAMILY HISTORY OF OVARIAN CANCER: ICD-10-CM

## 2025-02-24 DIAGNOSIS — Z80.1 FAMILY HISTORY OF LUNG CANCER: ICD-10-CM

## 2025-02-24 PROCEDURE — 90471 IMMUNIZATION ADMIN: CPT | Performed by: INTERNAL MEDICINE

## 2025-02-24 PROCEDURE — 63600175 PHARM REV CODE 636 W HCPCS: Performed by: INTERNAL MEDICINE

## 2025-02-24 PROCEDURE — 99215 OFFICE O/P EST HI 40 MIN: CPT | Mod: PBBFAC | Performed by: INTERNAL MEDICINE

## 2025-02-24 PROCEDURE — 1160F RVW MEDS BY RX/DR IN RCRD: CPT | Mod: CPTII,,, | Performed by: INTERNAL MEDICINE

## 2025-02-24 PROCEDURE — 99205 OFFICE O/P NEW HI 60 MIN: CPT | Mod: S$PBB,,, | Performed by: INTERNAL MEDICINE

## 2025-02-24 PROCEDURE — 96372 THER/PROPH/DIAG INJ SC/IM: CPT

## 2025-02-24 PROCEDURE — 3078F DIAST BP <80 MM HG: CPT | Mod: CPTII,,, | Performed by: INTERNAL MEDICINE

## 2025-02-24 PROCEDURE — 3008F BODY MASS INDEX DOCD: CPT | Mod: CPTII,,, | Performed by: INTERNAL MEDICINE

## 2025-02-24 PROCEDURE — 90677 PCV20 VACCINE IM: CPT | Performed by: INTERNAL MEDICINE

## 2025-02-24 PROCEDURE — 3075F SYST BP GE 130 - 139MM HG: CPT | Mod: CPTII,,, | Performed by: INTERNAL MEDICINE

## 2025-02-24 PROCEDURE — 1159F MED LIST DOCD IN RCRD: CPT | Mod: CPTII,,, | Performed by: INTERNAL MEDICINE

## 2025-02-24 RX ADMIN — PNEUMOCOCCAL 20-VALENT CONJUGATE VACCINE 0.5 ML
2.2; 2.2; 2.2; 2.2; 2.2; 2.2; 2.2; 2.2; 2.2; 2.2; 2.2; 2.2; 2.2; 2.2; 2.2; 2.2; 4.4; 2.2; 2.2; 2.2 INJECTION, SUSPENSION INTRAMUSCULAR at 12:02

## 2025-02-24 NOTE — NURSING
[id number]  Pt  was in clinic for initial visit.  Pt to infusion for pneumonia vaccine.  Pt denies any pain.  Does report nausea and she thinks it is from her nerves.

## 2025-02-24 NOTE — Clinical Note
Orders for 04/20/2025:  Genetic testing and counseling  Stage with contrast-enhanced CT scans of C/A/P and whole-body nuclear medicine bone scan at this time  Breast MRI with contrast Assess LVEF with echocardiogram at this time Refer to breast clinic for MediPort placement Follow-up in 2 weeks, with results of requested investigations.

## 2025-02-25 NOTE — NURSING
"MARQUITA Gallo met with patient today after her consult appointment with Dr. Fox. Patient attended appointment accompanied by her spouse. Provided patient with RN Sabino contact and explained role in patient's care. NCCN Distress Screen completed with a reported distress score of 7. Patient indicates she feels better after receiving information from Dr. Fox. Says she is anxious to start treatment. Denies any resource needs at this time. Social support reported as good. Provided information on in-house and community support/counseling services. Provided education on Pneumonia Vaccine. Patient agreed to receive vaccine today. Pharmacy and Infusion clinic notified. Educated on importance of dental services. Patient says she has full dentures. Answered all patient questions regarding treatment and expectations. Provided cancer center packet with clinic team, contact numbers, and information on cancer treatment. Patient verbalized understanding and agrees to contact MARQUITA Gallo if any needs or concerns arise.     Oncology Navigation   Intake  Cancer Type: Breast  Appointment Date: 02/24/25     Treatment  Current Status: Active; Staging work-up    Surgical Oncologist: Dr. Noguera    Medical Oncologist: Maximo Fox MD  Consult Date: 02/24/25  Chemotherapy: Planned  Chemotherapy Regimen: Preoperative pembrolizumab/carboplatin/Taxol, followed by   preoperative pembrolizumab/Cyclophosphamide/doxorubicin or epirubicin, followed by   adjuvant pembrolizumab       Procedures: CT; Bone scan; MRI; Echo; Port / PICC  Bone Scan Schedule Date: 03/06/25  CT Schedule Date: 03/06/25  Echo Schedule Date: 03/05/25  Port / PICC Schedule Date: 02/27/25    General Referrals: [employer]    [place]: Negative  [place]: Negative  Her2: Negative       Support Systems: Spouse/significant other; Family members; Jainism / brooke community; Friends / neighbors  Barriers of Care: Barriers to Care "Assessment completed-no barriers noted"   "   Acuity  Stage: 1  Systemic Treatment - predicted or initiated: Chemotherapy Regimen with Multiple drugs (+1)  Treatment Tolerability: Has not started treatment yet/treatment fully completed and side effects resolved  ECO  Comorbidities in Medical History: 1  Hospitalization Within the Past Month: 0   Needed: 0  Support: 0  Verbalizes Financial Concerns: 0  Transportation: 0  Mental Health: PHQ Score: 0  History of noncompliance/frequent no shows and cancellations: 0  Verbalizes the need for more education: 0  Navigation Acuity: 2     Follow Up  No follow-ups on file.

## 2025-02-26 DIAGNOSIS — Z17.421 TRIPLE NEGATIVE BREAST CANCER: Primary | ICD-10-CM

## 2025-02-26 DIAGNOSIS — C50.912 INVASIVE DUCTAL CARCINOMA OF BREAST, FEMALE, LEFT: ICD-10-CM

## 2025-02-26 DIAGNOSIS — C50.919 TRIPLE NEGATIVE BREAST CANCER: Primary | ICD-10-CM

## 2025-02-26 DIAGNOSIS — C50.912 INVASIVE DUCTAL CARCINOMA OF LEFT BREAST: ICD-10-CM

## 2025-02-27 ENCOUNTER — OFFICE VISIT (OUTPATIENT)
Dept: SURGERY | Facility: CLINIC | Age: 65
End: 2025-02-27
Payer: MEDICAID

## 2025-02-27 VITALS
HEART RATE: 73 BPM | BODY MASS INDEX: 39.34 KG/M2 | RESPIRATION RATE: 19 BRPM | DIASTOLIC BLOOD PRESSURE: 83 MMHG | OXYGEN SATURATION: 97 % | TEMPERATURE: 98 F | SYSTOLIC BLOOD PRESSURE: 128 MMHG | WEIGHT: 222 LBS | HEIGHT: 63 IN

## 2025-02-27 DIAGNOSIS — C50.912 INVASIVE DUCTAL CARCINOMA OF BREAST, FEMALE, LEFT: ICD-10-CM

## 2025-02-27 DIAGNOSIS — C50.919 TRIPLE NEGATIVE BREAST CANCER: ICD-10-CM

## 2025-02-27 DIAGNOSIS — C50.912 INVASIVE DUCTAL CARCINOMA OF LEFT BREAST: ICD-10-CM

## 2025-02-27 DIAGNOSIS — Z17.421 TRIPLE NEGATIVE BREAST CANCER: ICD-10-CM

## 2025-02-27 PROCEDURE — 99215 OFFICE O/P EST HI 40 MIN: CPT | Mod: PBBFAC

## 2025-02-27 RX ORDER — CEFAZOLIN SODIUM 2 G/50ML
2 SOLUTION INTRAVENOUS ONCE
OUTPATIENT
Start: 2025-03-24

## 2025-02-27 RX ORDER — HEPARIN SODIUM 5000 [USP'U]/ML
5000 INJECTION, SOLUTION INTRAVENOUS; SUBCUTANEOUS ONCE
OUTPATIENT
Start: 2025-03-24

## 2025-02-27 RX ORDER — SODIUM CHLORIDE 9 MG/ML
INJECTION, SOLUTION INTRAVENOUS CONTINUOUS
OUTPATIENT
Start: 2025-02-27

## 2025-02-27 NOTE — PROGRESS NOTES
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  She feels well and has no complaints.  She is still undergoing pre-neoadjuvant chemotherapy testing, however is supposed to start chemotherapy soon.  She has never had a central line or a pacemaker.  She has never had surgery on her neck or chest.  She denies any cardiac, pulmonary, kidney, liver, or intestinal history.    PMH:   Past Medical History:   Diagnosis Date    Hyperlipidemia     Menopause 4/27/20020      Meds: Current Medications[1]  Allergies: Review of patient's allergies indicates:  No Known Allergies  Social History: Social History[2]  Family History:   Family History   Problem Relation Name Age of Onset    Heart disease Mother Zari Vinay     Diabetes Mother Zari Vinay     Alzheimer's disease Mother Zari Vinay     Stroke Mother Zari Vinay     Arthritis Mother Zari Vinay     Cancer Father HarrisEarl         Lung cancer    Heart failure Father HarrisEarl     Cancer Sister Rakesh         ovarian    Diabetes Sister Rakesh     Rashes / Skin problems Sister Rakesh     Diabetes Brother Rakesh SLETTY     Epilepsy Brother Rakesh SLETTY     Heart failure Maternal Grandmother Mary     Diabetes Maternal Grandmother Mary     Osteoarthritis Maternal Aunt Valentina     Cancer Sister Jeannine  Jolley         Ovarian  cancer    Diabetes Sister Jeannine  Jolley     Ovarian cancer Sister Jeannine  Jolley     Diabetes Brother DOREEN FRAUSTO     Rashes / Skin problems Brother DOREEN FRAUSTO     Seizures Brother DOREEN FRAUSTO     Stroke Brother DOREEN FRAUSTO     Arthritis Brother Brando Vinay     Diabetes Brother DOREEN FRAUSTO     Rashes / Skin problems Brother DOREEN FRAUSTO     Seizures Brother DOREEN FRAUSTO     Stroke Brother DOREEN FRAUSTO      Surgical History:   Past Surgical History:   Procedure Laterality Date     ADENOIDECTOMY      CHOLECYSTECTOMY  1998    COLONOSCOPY      with biopsy    TONSILLECTOMY  1975     Review of Systems:  Skin: No rashes or itching.  Head: Denies headache or recent trauma.  Eyes: Denies eye pain or double vision.  Neck: Denies swelling or hoarseness of voice.  Respiratory: Denies shortness of breath or chest pain  Cardiac: Denies palpitations or swelling in hands/feet.  Gastrointestinal: Denies nausea, denies vomiting.   Urinary: Denies dysuria or hematuria.  Vascular: Denies claudication or leg swelling.  Neuro: Denies motor deficits. Denies weakness.  Endocrine: Denies excessive sweating or cold intolerance.  Psych: Denies memory problems. Denies anxiety.    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     -discussed risks, benefits, alternatives to surgery with patient.  She is interested in MediPort placement initiation of chemotherapy.  Offered surgery dates, she has interested in a date closer to the end of March given her current work schedule.  She reports there is not yet a planned date for initiation of chemo.  She would like port placed 03/24/2025.  -surgery consents reviewed and signed.  Preop orders placed    Marlene Cardoso MD  [employer] Surgery PGY5  02/27/2025           [1]   Current Outpatient Medications:     cholecalciferol, vitamin D3, 325 mcg (13,000 unit) Cap capsule, Take by mouth 2 (two) times a day., Disp: , Rfl:     herbal drugs Tab, Take by mouth. Over the counter herbal  supplement, Disp: , Rfl:     multivitamin capsule, Take 1 capsule by mouth once daily., Disp: , Rfl:     omega-3 acid ethyl esters (LOVAZA) 1 gram capsule, Take 2 g by mouth 2 (two) times daily., Disp: , Rfl:     THYROID ORAL, Take by mouth. Over the counter herbal supplement, Disp: , Rfl:     fluticasone propionate (FLONASE) 50 mcg/actuation nasal spray, 2 sprays (100 mcg total) by Each Nostril route once daily., Disp: 18.2 mL, Rfl: 0  [2]   Social History  Tobacco Use    Smoking status: Never     Passive exposure: Never    Smokeless tobacco: Never   Substance Use Topics    Alcohol use: Not Currently     Comment: I ONLY WINE MAYBE TWICE year    Drug use: Never

## 2025-02-27 NOTE — PROGRESS NOTES
Patient seen by Dr. Bocanegra. Patient instructed to RTC as needed. Surgery is scheduled for 3/24. Consent signed.

## 2025-02-27 NOTE — H&P (VIEW-ONLY)
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  She feels well and has no complaints.  She is still undergoing pre-neoadjuvant chemotherapy testing, however is supposed to start chemotherapy soon.  She has never had a central line or a pacemaker.  She has never had surgery on her neck or chest.  She denies any cardiac, pulmonary, kidney, liver, or intestinal history.    PMH:   Past Medical History:   Diagnosis Date    Hyperlipidemia     Menopause 4/27/20020      Meds: Current Medications[1]  Allergies: Review of patient's allergies indicates:  No Known Allergies  Social History: Social History[2]  Family History:   Family History   Problem Relation Name Age of Onset    Heart disease Mother Zari Vinay     Diabetes Mother Zari Vinay     Alzheimer's disease Mother Zari Vinay     Stroke Mother Zari Vinay     Arthritis Mother Zari Vinay     Cancer Father HarrisEarl         Lung cancer    Heart failure Father HarrisEarl     Cancer Sister Rakesh         ovarian    Diabetes Sister Rakesh     Rashes / Skin problems Sister Rakesh     Diabetes Brother Rakesh SLETTY     Epilepsy Brother Rakesh SLETTY     Heart failure Maternal Grandmother Mary     Diabetes Maternal Grandmother Mary     Osteoarthritis Maternal Aunt Valentina     Cancer Sister Jeannine  Jolley         Ovarian  cancer    Diabetes Sister Jeannine  Jolley     Ovarian cancer Sister Jeannine  Jolley     Diabetes Brother DOREEN FRAUSTO     Rashes / Skin problems Brother DOREEN FRAUSTO     Seizures Brother DOREEN FRAUSTO     Stroke Brother DOREEN FRAUSTO     Arthritis Brother Brando Vinay     Diabetes Brother DOREEN FRAUSTO     Rashes / Skin problems Brother DOREEN FRAUSTO     Seizures Brother DOREEN FRAUSTO     Stroke Brother DOREEN FRAUSTO      Surgical History:   Past Surgical History:   Procedure Laterality Date     ADENOIDECTOMY      CHOLECYSTECTOMY  1998    COLONOSCOPY      with biopsy    TONSILLECTOMY  1975     Review of Systems:  Skin: No rashes or itching.  Head: Denies headache or recent trauma.  Eyes: Denies eye pain or double vision.  Neck: Denies swelling or hoarseness of voice.  Respiratory: Denies shortness of breath or chest pain  Cardiac: Denies palpitations or swelling in hands/feet.  Gastrointestinal: Denies nausea, denies vomiting.   Urinary: Denies dysuria or hematuria.  Vascular: Denies claudication or leg swelling.  Neuro: Denies motor deficits. Denies weakness.  Endocrine: Denies excessive sweating or cold intolerance.  Psych: Denies memory problems. Denies anxiety.    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     -discussed risks, benefits, alternatives to surgery with patient.  She is interested in MediPort placement initiation of chemotherapy.  Offered surgery dates, she has interested in a date closer to the end of March given her current work schedule.  She reports there is not yet a planned date for initiation of chemo.  She would like port placed 03/24/2025.  -surgery consents reviewed and signed.  Preop orders placed    Marlene Cardoso MD  [employer] Surgery PGY5  02/27/2025           [1]   Current Outpatient Medications:     cholecalciferol, vitamin D3, 325 mcg (13,000 unit) Cap capsule, Take by mouth 2 (two) times a day., Disp: , Rfl:     herbal drugs Tab, Take by mouth. Over the counter herbal  supplement, Disp: , Rfl:     multivitamin capsule, Take 1 capsule by mouth once daily., Disp: , Rfl:     omega-3 acid ethyl esters (LOVAZA) 1 gram capsule, Take 2 g by mouth 2 (two) times daily., Disp: , Rfl:     THYROID ORAL, Take by mouth. Over the counter herbal supplement, Disp: , Rfl:     fluticasone propionate (FLONASE) 50 mcg/actuation nasal spray, 2 sprays (100 mcg total) by Each Nostril route once daily., Disp: 18.2 mL, Rfl: 0  [2]   Social History  Tobacco Use    Smoking status: Never     Passive exposure: Never    Smokeless tobacco: Never   Substance Use Topics    Alcohol use: Not Currently     Comment: I ONLY WINE MAYBE TWICE year    Drug use: Never

## 2025-03-05 ENCOUNTER — HOSPITAL ENCOUNTER (OUTPATIENT)
Dept: CARDIOLOGY | Facility: HOSPITAL | Age: 65
Discharge: HOME | End: 2025-03-05
Attending: INTERNAL MEDICINE
Payer: MEDICAID

## 2025-03-05 DIAGNOSIS — C50.912 INVASIVE DUCTAL CARCINOMA OF LEFT BREAST: ICD-10-CM

## 2025-03-05 DIAGNOSIS — C50.919 TRIPLE NEGATIVE BREAST CANCER: ICD-10-CM

## 2025-03-05 DIAGNOSIS — Z17.421 TRIPLE NEGATIVE BREAST CANCER: ICD-10-CM

## 2025-03-05 DIAGNOSIS — C50.912 INVASIVE DUCTAL CARCINOMA OF BREAST, FEMALE, LEFT: ICD-10-CM

## 2025-03-05 PROCEDURE — 93356 MYOCRD STRAIN IMG SPCKL TRCK: CPT | Mod: ,,, | Performed by: STUDENT IN AN ORGANIZED HEALTH CARE EDUCATION/TRAINING PROGRAM

## 2025-03-05 PROCEDURE — 93356 MYOCRD STRAIN IMG SPCKL TRCK: CPT

## 2025-03-05 PROCEDURE — 93306 TTE W/DOPPLER COMPLETE: CPT | Mod: 26,,, | Performed by: STUDENT IN AN ORGANIZED HEALTH CARE EDUCATION/TRAINING PROGRAM

## 2025-03-06 ENCOUNTER — HOSPITAL ENCOUNTER (OUTPATIENT)
Dept: RADIOLOGY | Facility: HOSPITAL | Age: 65
Discharge: HOME | End: 2025-03-06
Attending: INTERNAL MEDICINE
Payer: MEDICAID

## 2025-03-06 DIAGNOSIS — C50.919 TRIPLE NEGATIVE BREAST CANCER: ICD-10-CM

## 2025-03-06 DIAGNOSIS — C50.912 INVASIVE DUCTAL CARCINOMA OF BREAST, FEMALE, LEFT: ICD-10-CM

## 2025-03-06 DIAGNOSIS — C50.912 INVASIVE DUCTAL CARCINOMA OF LEFT BREAST: ICD-10-CM

## 2025-03-06 DIAGNOSIS — Z17.421 TRIPLE NEGATIVE BREAST CANCER: ICD-10-CM

## 2025-03-06 LAB
APICAL FOUR CHAMBER EJECTION FRACTION: 68 %
APICAL TWO CHAMBER EJECTION FRACTION: 49 %
CREAT SERPL-MCNC: 0.78 MG/DL (ref 0.55–1.02)
CV ECHO LV RWT: 0.58 CM
DOP CALC LVOT AREA: 2.5 CM2
DOP CALC LVOT DIAMETER: 1.8 CM
DOP CALC LVOT PEAK VEL: 0.7 M/S
DOP CALC LVOT STROKE VOLUME: 34.6 CM3
DOP CALC MV VTI: 25.1 CM
DOP CALCLVOT PEAK VEL VTI: 13.6 CM
E WAVE DECELERATION TIME: 174 MSEC
E/A RATIO: 0.64
E/E' RATIO: 7 M/S
ECHO LV POSTERIOR WALL: 1.1 CM (ref 0.6–1.1)
FRACTIONAL SHORTENING: 26.3 % (ref 28–44)
GFR SERPLBLD CREATININE-BSD FMLA CKD-EPI: >60 ML/MIN/1.73/M2
GLOBAL LONGITUIDAL STRAIN: 20.3 %
INTERVENTRICULAR SEPTUM: 1 CM (ref 0.6–1.1)
LEFT ATRIUM AREA SYSTOLIC (APICAL 2 CHAMBER): 14 CM2
LEFT ATRIUM AREA SYSTOLIC (APICAL 4 CHAMBER): 22.8 CM2
LEFT ATRIUM SIZE: 2.8 CM
LEFT INTERNAL DIMENSION IN SYSTOLE: 2.8 CM (ref 2.1–4)
LEFT VENTRICLE DIASTOLIC VOLUME: 62 ML
LEFT VENTRICLE END DIASTOLIC VOLUME APICAL 2 CHAMBER: 36.3 ML
LEFT VENTRICLE END DIASTOLIC VOLUME APICAL 4 CHAMBER: 52.5 ML
LEFT VENTRICLE END SYSTOLIC VOLUME APICAL 2 CHAMBER: 36.2 ML
LEFT VENTRICLE END SYSTOLIC VOLUME APICAL 4 CHAMBER: 71.9 ML
LEFT VENTRICLE SYSTOLIC VOLUME: 30 ML
LEFT VENTRICULAR INTERNAL DIMENSION IN DIASTOLE: 3.8 CM (ref 3.5–6)
LEFT VENTRICULAR MASS: 125.8 G
LV LATERAL E/E' RATIO: 5.8 M/S
LV SEPTAL E/E' RATIO: 7.4 M/S
LVED V (TEICH): 62 ML
LVES V (TEICH): 29.6 ML
LVOT MG: 1 MMHG
LVOT MV: 0.49 CM/S
MV MEAN GRADIENT: 2 MMHG
MV PEAK A VEL: 0.81 M/S
MV PEAK E VEL: 0.52 M/S
MV PEAK GRADIENT: 5 MMHG
MV STENOSIS PRESSURE HALF TIME: 96 MS
MV VALVE AREA BY CONTINUITY EQUATION: 1.38 CM2
MV VALVE AREA P 1/2 METHOD: 2.29 CM2
OHS CV RV/LV RATIO: 0.68 CM
OHS LV EJECTION FRACTION SIMPSONS BIPLANE MOD: 61 %
PISA TR MAX VEL: 1.4 M/S
PV PEAK GRADIENT: 4 MMHG
PV PEAK VELOCITY: 0.96 M/S
RIGHT VENTRICLE DIASTOLIC BASEL DIMENSION: 2.6 CM
RIGHT VENTRICULAR END-DIASTOLIC DIMENSION: 2.6 CM
TDI LATERAL: 0.09 M/S
TDI SEPTAL: 0.07 M/S
TDI: 0.08 M/S
TRICUSPID ANNULAR PLANE SYSTOLIC EXCURSION: 2.87 CM

## 2025-03-06 PROCEDURE — 74177 CT ABD & PELVIS W/CONTRAST: CPT | Mod: TC

## 2025-03-06 PROCEDURE — 82565 ASSAY OF CREATININE: CPT | Performed by: INTERNAL MEDICINE

## 2025-03-06 PROCEDURE — A9503 TC99M MEDRONATE: HCPCS | Performed by: INTERNAL MEDICINE

## 2025-03-06 PROCEDURE — 78306 BONE IMAGING WHOLE BODY: CPT | Mod: TC

## 2025-03-06 PROCEDURE — 25500020 PHARM REV CODE 255

## 2025-03-06 RX ORDER — TC 99M MEDRONATE 20 MG/10ML
25 INJECTION, POWDER, LYOPHILIZED, FOR SOLUTION INTRAVENOUS
Status: COMPLETED | OUTPATIENT
Start: 2025-03-06 | End: 2025-03-06

## 2025-03-06 RX ADMIN — IOHEXOL 100 ML: 350 INJECTION, SOLUTION INTRAVENOUS at 10:03

## 2025-03-06 RX ADMIN — TECHNETIUM TC 99M MEDRONATE 25.8 MILLICURIE: 20 INJECTION, POWDER, LYOPHILIZED, FOR SOLUTION INTRAVENOUS at 09:03

## 2025-03-10 ENCOUNTER — ANESTHESIA EVENT (OUTPATIENT)
Dept: SURGERY | Facility: HOSPITAL | Age: 65
End: 2025-03-10
Payer: MEDICAID

## 2025-03-10 NOTE — ANESTHESIA PREPROCEDURE EVALUATION
Alma Rosa Disla is a 64 y.o. female PRESENTING FOR [id number]-CATH (Chest) with a history of   -Invasive ductal carcinoma of left breast     Vitals:    03/24/25 0654 03/24/25 0705 03/24/25 0900   BP:  133/73 122/68   Pulse:  74 72   Resp:   20   Temp:  36.9 °C (98.5 °F) 36.3 °C (97.3 °F)   TempSrc:  Oral Temporal   SpO2:  95% 99%   Weight: 99.4 kg (219 lb 3.2 oz)         -HLD  -OBESITY, BMI 39  -psoriatic arthritis       BETA-BLOCKER: NONE    New Orders for Anesthesia: NONE    Active Ambulatory Problems     Diagnosis Date Noted    Psoriatic arthritis 09/23/2022    Mixed hyperlipidemia 09/23/2022    Cystitis 07/24/2023    Positive JENNIFER (antinuclear antibody) 08/24/2023    Class 2 severe obesity due to excess calories with serious comorbidity and body mass index (BMI) of 37.0 to 37.9 in adult 02/26/2024    Abnormal mammogram of left breast 02/26/2024    Tinnitus of both ears 03/21/2024    Bilateral hearing loss 03/21/2024    Visual changes 12/12/2024    Triple negative breast cancer 02/22/2025    Invasive ductal carcinoma of left breast 02/22/2025    Postmenopausal 02/22/2025    Obesity (BMI 35.0-39.9 without comorbidity) 02/22/2025    Family history of ovarian cancer 02/22/2025    Family history of lung cancer 02/22/2025     Resolved Ambulatory Problems     Diagnosis Date Noted    BMI 35.0-35.9,adult 09/23/2022    Tobacco dependence 09/23/2022    Need for vaccination 09/23/2022    Wellness examination 08/24/2023    Class 2 obesity due to excess calories without serious comorbidity with body mass index (BMI) of 36.0 to 36.9 in adult 08/24/2023     Past Medical History:   Diagnosis Date    Breast cancer     Hyperlipidemia     Menopause 4/27/20020     Pre-op Assessment    I have reviewed the Patient Summary Reports.     I have reviewed the Nursing Notes. I have reviewed the NPO Status.   I have reviewed the Medications.     Review of Systems  Anesthesia Hx:  No problems with previous Anesthesia   History of prior  surgery of interest to airway management or planning:          Denies Family Hx of Anesthesia complications.    Denies Personal Hx of Anesthesia complications.                    Social:  Smoker       Hematology/Oncology:  Hematology Normal                     Current/Recent Cancer.                EENT/Dental:  EENT/Dental Normal           Cardiovascular:                hyperlipidemia                               Pulmonary:  Pulmonary Normal                       Renal/:  Renal/ Normal                 Hepatic/GI:  Hepatic/GI Normal                    Musculoskeletal:  Musculoskeletal Normal                Neurological:  Neurology Normal                                      Endocrine:  Endocrine Normal          Obesity / BMI > 30  Dermatological:  Skin Normal    Psych:  Psychiatric Normal                    Physical Exam  General: Alert    Airway:  Mallampati: I / I  Mouth Opening: Normal  TM Distance: Normal  Tongue: Normal  Neck ROM: Normal ROM    Dental:  Intact      Lab Results   Component Value Date    WBC 5.34 07/24/2023    HGB 14.1 07/24/2023    HCT 43.5 07/24/2023    MCV 94.2 (H) 07/24/2023     07/24/2023       CMP  Sodium   Date Value Ref Range Status   07/24/2023 143 136 - 145 mmol/L Final     Potassium   Date Value Ref Range Status   07/24/2023 4.1 3.5 - 5.1 mmol/L Final     Chloride   Date Value Ref Range Status   07/24/2023 107 98 - 107 mmol/L Final     CO2   Date Value Ref Range Status   07/24/2023 31 23 - 31 mmol/L Final     Blood Urea Nitrogen   Date Value Ref Range Status   07/24/2023 14.5 9.8 - 20.1 mg/dL Final     Creatinine   Date Value Ref Range Status   03/06/2025 0.78 0.55 - 1.02 mg/dL Final     Calcium   Date Value Ref Range Status   07/24/2023 9.8 8.4 - 10.2 mg/dL Final     Albumin   Date Value Ref Range Status   07/24/2023 4.0 3.4 - 4.8 g/dL Final     Bilirubin Total   Date Value Ref Range Status   07/24/2023 0.4 <=1.5 mg/dL Final     ALP   Date Value Ref Range Status    07/24/2023 129 40 - 150 unit/L Final     AST   Date Value Ref Range Status   07/24/2023 19 5 - 34 unit/L Final     ALT   Date Value Ref Range Status   07/24/2023 23 0 - 55 unit/L Final     eGFR   Date Value Ref Range Status   03/06/2025 >60 mL/min/1.73/m2 Final     Comment:     Estimated GFR calculated using the CKD-EPI creatinine (2021) equation.           Anesthesia Plan  Type of Anesthesia, risks & benefits discussed:    Anesthesia Type: MAC  Intra-op Monitoring Plan: Standard ASA Monitors  Post Op Pain Control Plan: IV/PO Opioids PRN  (medical reason for not using multimodal pain management)  Induction:  IV  Informed Consent: Informed consent signed with the Patient and all parties understand the risks and agree with anesthesia plan.  All questions answered. Patient consented to blood products? No  ASA Score: 3  Day of Surgery Review of History & Physical: H&P Update referred to the surgeon/provider.    Ready For Surgery From Anesthesia Perspective.     .

## 2025-03-21 ENCOUNTER — PATIENT MESSAGE (OUTPATIENT)
Dept: SURGERY | Facility: HOSPITAL | Age: 65
End: 2025-03-21
Payer: MEDICAID

## 2025-03-23 RX ORDER — SODIUM CHLORIDE, SODIUM LACTATE, POTASSIUM CHLORIDE, CALCIUM CHLORIDE 600; 310; 30; 20 MG/100ML; MG/100ML; MG/100ML; MG/100ML
INJECTION, SOLUTION INTRAVENOUS CONTINUOUS
OUTPATIENT
Start: 2025-03-24

## 2025-03-24 ENCOUNTER — ANESTHESIA (OUTPATIENT)
Dept: SURGERY | Facility: HOSPITAL | Age: 65
End: 2025-03-24
Payer: MEDICAID

## 2025-03-24 ENCOUNTER — HOSPITAL ENCOUNTER (OUTPATIENT)
Facility: HOSPITAL | Age: 65
Discharge: HOME | End: 2025-03-24
Attending: SURGERY | Admitting: SURGERY
Payer: MEDICAID

## 2025-03-24 DIAGNOSIS — Z01.818 PREOP EXAMINATION: ICD-10-CM

## 2025-03-24 DIAGNOSIS — C50.912 INVASIVE DUCTAL CARCINOMA OF LEFT BREAST: Primary | ICD-10-CM

## 2025-03-24 DIAGNOSIS — C50.919 TRIPLE NEGATIVE BREAST CANCER: ICD-10-CM

## 2025-03-24 DIAGNOSIS — Z17.421 TRIPLE NEGATIVE BREAST CANCER: ICD-10-CM

## 2025-03-24 LAB
ALBUMIN SERPL-MCNC: 3.7 G/DL (ref 3.4–4.8)
ALBUMIN/GLOB SERPL: 1 RATIO (ref 1.1–2)
ALP SERPL-CCNC: 125 UNIT/L (ref 40–150)
ALT SERPL-CCNC: 24 UNIT/L (ref 0–55)
ANION GAP SERPL CALC-SCNC: 6 MEQ/L
AST SERPL-CCNC: 19 UNIT/L (ref 11–45)
BASOPHILS # BLD AUTO: 0.05 X10(3)/MCL
BASOPHILS NFR BLD AUTO: 0.9 %
BILIRUB SERPL-MCNC: 0.4 MG/DL
BUN SERPL-MCNC: 15.8 MG/DL (ref 9.8–20.1)
CALCIUM SERPL-MCNC: 9.4 MG/DL (ref 8.4–10.2)
CHLORIDE SERPL-SCNC: 110 MMOL/L (ref 98–107)
CO2 SERPL-SCNC: 26 MMOL/L (ref 23–31)
CREAT SERPL-MCNC: 0.8 MG/DL (ref 0.55–1.02)
CREAT/UREA NIT SERPL: 20
EOSINOPHIL # BLD AUTO: 0.09 X10(3)/MCL (ref 0–0.9)
EOSINOPHIL NFR BLD AUTO: 1.7 %
ERYTHROCYTE [DISTWIDTH] IN BLOOD BY AUTOMATED COUNT: 13.4 % (ref 11.5–17)
GFR SERPLBLD CREATININE-BSD FMLA CKD-EPI: >60 ML/MIN/1.73/M2
GLOBULIN SER-MCNC: 3.8 GM/DL (ref 2.4–3.5)
GLUCOSE SERPL-MCNC: 93 MG/DL (ref 82–115)
HCT VFR BLD AUTO: 40.8 % (ref 37–47)
HGB BLD-MCNC: 13.7 G/DL (ref 12–16)
IMM GRANULOCYTES # BLD AUTO: 0.01 X10(3)/MCL (ref 0–0.04)
IMM GRANULOCYTES NFR BLD AUTO: 0.2 %
LYMPHOCYTES # BLD AUTO: 1.66 X10(3)/MCL (ref 0.6–4.6)
LYMPHOCYTES NFR BLD AUTO: 30.9 %
MCH RBC QN AUTO: 31.1 PG (ref 27–31)
MCHC RBC AUTO-ENTMCNC: 33.6 G/DL (ref 33–36)
MCV RBC AUTO: 92.7 FL (ref 80–94)
MONOCYTES # BLD AUTO: 0.39 X10(3)/MCL (ref 0.1–1.3)
MONOCYTES NFR BLD AUTO: 7.2 %
NEUTROPHILS # BLD AUTO: 3.18 X10(3)/MCL (ref 2.1–9.2)
NEUTROPHILS NFR BLD AUTO: 59.1 %
NRBC BLD AUTO-RTO: 0 %
PLATELET # BLD AUTO: 243 X10(3)/MCL (ref 130–400)
PMV BLD AUTO: 10.7 FL (ref 7.4–10.4)
POTASSIUM SERPL-SCNC: 4.3 MMOL/L (ref 3.5–5.1)
PROT SERPL-MCNC: 7.5 GM/DL (ref 5.8–7.6)
RBC # BLD AUTO: 4.4 X10(6)/MCL (ref 4.2–5.4)
SODIUM SERPL-SCNC: 142 MMOL/L (ref 136–145)
WBC # BLD AUTO: 5.38 X10(3)/MCL (ref 4.5–11.5)

## 2025-03-24 PROCEDURE — D9220A PRA ANESTHESIA: Mod: ANES,,, | Performed by: SPECIALIST

## 2025-03-24 PROCEDURE — D9220A PRA ANESTHESIA: Mod: CRNA,,,

## 2025-03-24 PROCEDURE — 93005 ELECTROCARDIOGRAM TRACING: CPT

## 2025-03-24 PROCEDURE — 37000009 HC ANESTHESIA EA ADD 15 MINS: Performed by: SURGERY

## 2025-03-24 PROCEDURE — 71000015 HC POSTOP RECOV 1ST HR: Performed by: SURGERY

## 2025-03-24 PROCEDURE — 25000003 PHARM REV CODE 250: Performed by: ANESTHESIOLOGY

## 2025-03-24 PROCEDURE — 37000008 HC ANESTHESIA 1ST 15 MINUTES: Performed by: SURGERY

## 2025-03-24 PROCEDURE — 63600175 PHARM REV CODE 636 W HCPCS

## 2025-03-24 PROCEDURE — 25000003 PHARM REV CODE 250

## 2025-03-24 PROCEDURE — 36000706: Performed by: SURGERY

## 2025-03-24 PROCEDURE — 63600175 PHARM REV CODE 636 W HCPCS: Performed by: SURGERY

## 2025-03-24 PROCEDURE — 36000707: Performed by: SURGERY

## 2025-03-24 PROCEDURE — 80053 COMPREHEN METABOLIC PANEL: CPT

## 2025-03-24 PROCEDURE — 71000016 HC POSTOP RECOV ADDL HR: Performed by: SURGERY

## 2025-03-24 PROCEDURE — 63600175 PHARM REV CODE 636 W HCPCS: Performed by: ANESTHESIOLOGY

## 2025-03-24 PROCEDURE — 85025 COMPLETE CBC W/AUTO DIFF WBC: CPT

## 2025-03-24 PROCEDURE — C1788 PORT, INDWELLING, IMP: HCPCS | Performed by: SURGERY

## 2025-03-24 DEVICE — PORT POWER CLEAR VIEW: Type: IMPLANTABLE DEVICE | Site: CHEST | Status: FUNCTIONAL

## 2025-03-24 RX ORDER — HEPARIN SOD,PORCINE/0.9 % NACL 1000/500ML
INTRAVENOUS SOLUTION INTRAVENOUS
Status: DISCONTINUED | OUTPATIENT
Start: 2025-03-24 | End: 2025-03-24 | Stop reason: HOSPADM

## 2025-03-24 RX ORDER — ACETAMINOPHEN 325 MG/1
650 TABLET ORAL ONCE
Status: COMPLETED | OUTPATIENT
Start: 2025-03-24 | End: 2025-03-24

## 2025-03-24 RX ORDER — HEPARIN 100 UNIT/ML
SYRINGE INTRAVENOUS
Status: DISCONTINUED | OUTPATIENT
Start: 2025-03-24 | End: 2025-03-24 | Stop reason: HOSPADM

## 2025-03-24 RX ORDER — SODIUM CHLORIDE 9 MG/ML
INJECTION, SOLUTION INTRAVENOUS CONTINUOUS
Status: DISCONTINUED | OUTPATIENT
Start: 2025-03-24 | End: 2025-03-24 | Stop reason: HOSPADM

## 2025-03-24 RX ORDER — CEFAZOLIN SODIUM 1 G/3ML
2 INJECTION, POWDER, FOR SOLUTION INTRAMUSCULAR; INTRAVENOUS ONCE
Status: DISCONTINUED | OUTPATIENT
Start: 2025-03-24 | End: 2025-03-24 | Stop reason: HOSPADM

## 2025-03-24 RX ORDER — HEPARIN SODIUM 5000 [USP'U]/ML
5000 INJECTION, SOLUTION INTRAVENOUS; SUBCUTANEOUS ONCE
Status: COMPLETED | OUTPATIENT
Start: 2025-03-24 | End: 2025-03-24

## 2025-03-24 RX ORDER — ACETAMINOPHEN 500 MG
500 TABLET ORAL EVERY 6 HOURS PRN
Qty: 10 TABLET | Refills: 0 | Status: SHIPPED | OUTPATIENT
Start: 2025-03-24

## 2025-03-24 RX ORDER — MIDAZOLAM HYDROCHLORIDE 2 MG/2ML
2 INJECTION, SOLUTION INTRAMUSCULAR; INTRAVENOUS ONCE AS NEEDED
Status: COMPLETED | OUTPATIENT
Start: 2025-03-24 | End: 2025-03-24

## 2025-03-24 RX ORDER — PROPOFOL 10 MG/ML
INJECTION, EMULSION INTRAVENOUS CONTINUOUS PRN
Status: DISCONTINUED | OUTPATIENT
Start: 2025-03-24 | End: 2025-03-24

## 2025-03-24 RX ORDER — LIDOCAINE HYDROCHLORIDE 20 MG/ML
INJECTION INTRAVENOUS
Status: DISCONTINUED | OUTPATIENT
Start: 2025-03-24 | End: 2025-03-24

## 2025-03-24 RX ORDER — LIDOCAINE HYDROCHLORIDE 10 MG/ML
INJECTION, SOLUTION EPIDURAL; INFILTRATION; INTRACAUDAL; PERINEURAL
Status: DISCONTINUED | OUTPATIENT
Start: 2025-03-24 | End: 2025-03-24 | Stop reason: HOSPADM

## 2025-03-24 RX ORDER — CEFAZOLIN SODIUM 1 G/3ML
INJECTION, POWDER, FOR SOLUTION INTRAMUSCULAR; INTRAVENOUS
Status: DISCONTINUED | OUTPATIENT
Start: 2025-03-24 | End: 2025-03-24

## 2025-03-24 RX ORDER — KETAMINE HCL IN 0.9 % NACL 50 MG/5 ML
SYRINGE (ML) INTRAVENOUS
Status: DISCONTINUED | OUTPATIENT
Start: 2025-03-24 | End: 2025-03-24

## 2025-03-24 RX ADMIN — Medication 20 MG: at 10:03

## 2025-03-24 RX ADMIN — HEPARIN SODIUM 5000 UNITS: 5000 INJECTION, SOLUTION INTRAVENOUS; SUBCUTANEOUS at 07:03

## 2025-03-24 RX ADMIN — PROPOFOL 120 MCG/KG/MIN: 10 INJECTION, EMULSION INTRAVENOUS at 09:03

## 2025-03-24 RX ADMIN — ACETAMINOPHEN 650 MG: 325 TABLET, FILM COATED ORAL at 12:03

## 2025-03-24 RX ADMIN — Medication 10 MG: at 10:03

## 2025-03-24 RX ADMIN — LIDOCAINE HYDROCHLORIDE 100 MG: 20 INJECTION INTRAVENOUS at 09:03

## 2025-03-24 RX ADMIN — CEFAZOLIN 2 G: 330 INJECTION, POWDER, FOR SOLUTION INTRAMUSCULAR; INTRAVENOUS at 09:03

## 2025-03-24 RX ADMIN — MIDAZOLAM HYDROCHLORIDE 2 MG: 1 INJECTION, SOLUTION INTRAMUSCULAR; INTRAVENOUS at 09:03

## 2025-03-24 NOTE — OP NOTE
PATIENT NAME: Alma Rosa Disla  MRN: [id number]  ADMIT DATE: 3/24/2025  PROCEDURE DATE: 03/24/2025`    SURGEON: Vik Lynch Jr., MD    RESIDENT:   MD Ceferino Tran MD Taylor Dartez, L3    PREOPERATIVE DIAGNOSES:  Breast cancer    POSTOPERATIVE DIAGNOSES:  Same    PROCEDURE PERFORMED:  Left internal jugular mediport placement    INDICATION: For chemotherapy    FINDINGS: None    ANESTHESIA: MAC and local    DRAINS: None    SPECIMEN: None    ESTIMATED BLOOD LOSS: 10 mL    COMPLICATIONS: None    TECHNIQUE:   Informed consented was obtained prior to the procedure. All risks, benefits, alternatives were explained in detail to the patient. The patient was wheeled into the operating theatre. The patient was placed in the supine position with a bump. The bilateral neck and chest were prepped and draped in the standard fashion. A formal timeout was held confirming correct patient, procedure, site, preoperative antibiotics, VTE prophylaxis, and all operating room staff.     US was used to confirm viability of Right IJ and a decision was made to pursue placement there. Local anesthetic was injected. US-guided percutaneous puncture of Right IJ vein was performed. A guidewire was placed and confirmed to be in excellent position under fluoroscopy and secured with a hemostat.    Local anesthetic was applied to the mediport insertion site. A #15 blade was used to make an incision which was carried down to the subcutaneous tissues with bovie electrocautery. A subcutaneous pocket was then created with blunt dissection to confirm mediport placement. Hemostasis was ensured. 2-0 Prolene suture were then placed on the medial and lateral edges of the pocket to prepare the mediport for parachuting.    Attention was then directed to the guidewire over which a peel-away sheath was introduced under fluorscopy. The catheter was then adjusted until the distal edge was at the border of the SVC and Right atrium. After  placement was confirmed with fluorscopy, the mediport was parachuted down into the pocket and the catheter was attached and locked. The catheter was flushed with a dilute heparin solution followed by heparin ensuring the catheter was not dislodged. The catheter and port were secured to the pectoralis fascia in two locations with 2-0 Prolene suture. For the pocket, deep tissue were closed with 2-0 vicryl deep dermals and 4-0 monocryl subcutaneous. The neck was closed with a 4-0 monocryl deep dermal. Dermabond was applied.    At the conclusion of the procedure all counts were correct x 2. The patient tolerated the procedure well and was transferred to PACU in stable condition.    Staff Vik Lynch Jr., MD was present for all critical portions of the procedure.    Ceferino Bo MD  [facility] Surgery PGY-1

## 2025-03-24 NOTE — MEDICAL/APP STUDENT
DISCHARGE SUMMARY    Admit Date: 3/24/2025  Discharge Date: 3/24/2025  Admitting Physician: Vik Lynch Jr.,*  Consulting Physicians(s):     Admission HPI:   64 year old female with pmhx of HLD, psoriatic arthritis, and newly diagnosed L triple negative intraductal papilloma is here for Mediport placement to the R chest.    Hospital Course:   Patient was assessed on arrival and risks, benefits, and alternative options were discussed and understood by the patient/family. Patient was consented for the procedure, and the appropriate site was marked with the involvement of the patient. The procedure occurred uneventfully and the patient was discharged home.    Procedures Performed:   Mediport placement to R chest via R IJ vein    Discharge Condition: good    Disposition: Home or Self Care    Follow-Up Plan:   No clinic follow-up necessary.  Patient has been instructed to call general surgery clinic or come to hospital for ED services should complications (inappropriate pain, swelling, fever, induration, copious discharge, either bloody or purulent) arise.     Discharge Instructions:   Activity:  No heavy lifting (>10 lbs) for 2 weeks  Diet:  Adult regular diet  Wound Care:  See wound care instructions in discharge orders    Discharge Medications:     Medication List        START taking these medications      acetaminophen 500 MG tablet  Commonly known as: TYLENOL  Take 1 tablet (500 mg total) by mouth every 6 (six) hours as needed for Pain.            CONTINUE taking these medications      cholecalciferol (vitamin D3) 325 mcg (13,000 unit) Cap capsule     herbal drugs Tab     multivitamin capsule     omega-3 acid ethyl esters 1 gram capsule  Commonly known as: LOVAZA     THYROID ORAL            ASK your doctor about these medications      fluticasone propionate 50 mcg/actuation nasal spray  Commonly known as: FLONASE  2 sprays (100 mcg total) by Each Nostril route once daily.               Where to Get Your  Medications        These medications were sent to [place], [place] - [address]  [address] [facility] [address]      Phone: [phone]   acetaminophen 500 MG tablet          Haylee [place], L3  [facility] [place] [place]  3/24/2025    I have reviewed the notes, assessments, and/or procedures performed by medical student, I concur with her/his documentation of Alma Rosa Disla.    Ceferino Bo MD  [facility] Surgery PGY-1

## 2025-03-24 NOTE — INTERVAL H&P NOTE
The patient has been examined and the H&P has been reviewed:    I concur with the findings and no changes have occurred since H&P was written.  Surgery risks, benefits and alternative options discussed and understood by patient/family.  Pt is consented. Pt has been NPO since 5 pm 3/23. All questions and concerns addressed.     Yamini Newman MD  [facility] Surgery PGY-1

## 2025-03-24 NOTE — MEDICAL/APP STUDENT
DISCHARGE SUMMARY    Admit Date: 3/24/2025  Discharge Date: 3/24/2025  Admitting Physician: Vik Lynch Jr.,*  Consulting Physicians(s):     Admission HPI:   64 year old female with pmhx of HLD, psoriatic arthritis, and newly diagnosed L triple negative intraductal papilloma is here for Mediport placement to the R chest.    Hospital Course:   Patient was assessed on arrival and risks, benefits, and alternative options were discussed and understood by the patient/family. Patient was consented for the procedure, and the appropriate site was marked with the involvement of the patient.    Procedures Performed:   Mediport placement to R chest via R IJ vein    Discharge Condition: good    Disposition: Home or Self Care    Follow-Up Plan:   No clinic follow-up necessary.  Patient has been instructed to call general surgery clinic or come to hospital for ED services should complications (inappropriate pain, swelling, fever, induration, copious discharge, either bloody or purulent) arise.     Discharge Instructions:   Activity:  No heavy lifting (>10 lbs) for 2 weeks  Diet:  Adult regular diet  Wound Care:  See wound care instructions in discharge orders    Discharge Medications:     Medication List        ASK your doctor about these medications      cholecalciferol (vitamin D3) 325 mcg (13,000 unit) Cap capsule     fluticasone propionate 50 mcg/actuation nasal spray  Commonly known as: FLONASE  2 sprays (100 mcg total) by Each Nostril route once daily.     herbal drugs Tab     multivitamin capsule     omega-3 acid ethyl esters 1 gram capsule  Commonly known as: LOVAZA     THYROID ORAL               Haylee Vernon, L3  [employer] [facility], [place]  3/24/2025

## 2025-03-24 NOTE — TRANSFER OF CARE
Anesthesia Transfer of Care Note    Patient: Alma Rosa Disla    Procedure(s) Performed: Procedure(s) (LRB):  [id number] (N/A)    Patient location: PACU    Anesthesia Type: MAC    Transport from OR: Transported from OR on room air with adequate spontaneous ventilation    Post pain: adequate analgesia    Post assessment: no apparent anesthetic complications    Post vital signs: stable    Level of consciousness: sedated    Nausea/Vomiting: no nausea/vomiting    Complications: none    Transfer of care protocol was followed      Last vitals: Visit Vitals  /76   Pulse 87   Temp 36.2 °C (97.2 °F) (Temporal)   Resp 20   Wt 99.4 kg (219 lb 3.2 oz)   SpO2 98%   Breastfeeding No   BMI 38.83 kg/m²

## 2025-03-25 LAB
OHS QRS DURATION: 80 MS
OHS QTC CALCULATION: 416 MS

## 2025-03-25 NOTE — DISCHARGE SUMMARY
DISCHARGE SUMMARY     Admit Date: 3/24/2025  Discharge Date: 3/24/2025  Admitting Physician: Vik Lynch Jr.,*  Consulting Physicians(s):      Admission HPI:   64 year old female with pmhx of HLD, psoriatic arthritis, and newly diagnosed L triple negative intraductal papilloma is here for Mediport placement to the R chest.     Hospital Course:   Patient was assessed on arrival and risks, benefits, and alternative options were discussed and understood by the patient/family. Patient was consented for the procedure, and the appropriate site was marked with the involvement of the patient. The procedure occurred uneventfully and the patient was discharged home.     Procedures Performed:   Mediport placement to R chest via R IJ vein     Discharge Condition: good     Disposition: Home or Self Care     Follow-Up Plan:   No clinic follow-up necessary.  Patient has been instructed to call general surgery clinic or come to hospital for ED services should complications (inappropriate pain, swelling, fever, induration, copious discharge, either bloody or purulent) arise.      Discharge Instructions:   Activity:  No heavy lifting (>10 lbs) for 2 weeks  Diet:  Adult regular diet  Wound Care:  See wound care instructions in discharge orders     Discharge Medications:      Medication List          START taking these medications       acetaminophen 500 MG tablet  Commonly known as: TYLENOL  Take 1 tablet (500 mg total) by mouth every 6 (six) hours as needed for Pain.                CONTINUE taking these medications       cholecalciferol (vitamin D3) 325 mcg (13,000 unit) Cap capsule      herbal drugs Tab      multivitamin capsule      omega-3 acid ethyl esters 1 gram capsule  Commonly known as: LOVAZA      THYROID ORAL                ASK your doctor about these medications       fluticasone propionate 50 mcg/actuation nasal spray  Commonly known as: FLONASE  2 sprays (100 mcg total) by Each Nostril route once daily.                     Where to Get Your Medications          These medications were sent to [place], [place] - [address]  [address] [facility] [address]        Phone: [phone]   acetaminophen 500 MG tablet            Haylee Vernon, L3  [facility]  [facility] [place], [place]  3/24/2025     I have reviewed the notes, assessments, and/or procedures performed by medical student, I concur with her/his documentation of Alma Rosa Disla.     Ceferino Bo MD  [facility] Surgery PGY-1

## 2025-03-26 VITALS
SYSTOLIC BLOOD PRESSURE: 130 MMHG | OXYGEN SATURATION: 99 % | TEMPERATURE: 98 F | HEART RATE: 80 BPM | BODY MASS INDEX: 38.83 KG/M2 | RESPIRATION RATE: 20 BRPM | DIASTOLIC BLOOD PRESSURE: 76 MMHG | WEIGHT: 219.19 LBS

## 2025-03-26 NOTE — ANESTHESIA POSTPROCEDURE EVALUATION
Anesthesia Post Evaluation    Patient: Alma Rosa Disla    Procedure(s) Performed: Procedure(s) (LRB):  [id number] (N/A)    Final Anesthesia Type: general      Patient location during evaluation: PACU  Patient participation: Yes- Able to Participate  Level of consciousness: awake and responds to stimulation  Post-procedure vital signs: reviewed and stable  Pain management: adequate  Airway patency: patent    PONV status at discharge: No PONV  Anesthetic complications: no      Cardiovascular status: blood pressure returned to baseline  Respiratory status: unassisted  Hydration status: euvolemic  Follow-up not needed.          Vitals Value Taken Time   /76 03/24/25 12:18   Temp 36.7 °C (98.1 °F) 03/24/25 12:18   Pulse 80 03/24/25 12:18   Resp 20 03/24/25 12:18   SpO2 99 % 03/24/25 12:18         No case tracking events are documented in the log.      Pain/Tung Score: No data recorded

## 2025-04-02 ENCOUNTER — TELEPHONE (OUTPATIENT)
Dept: HEMATOLOGY/ONCOLOGY | Facility: CLINIC | Age: 65
End: 2025-04-02
Payer: MEDICARE

## 2025-04-03 ENCOUNTER — TELEPHONE (OUTPATIENT)
Dept: HEMATOLOGY/ONCOLOGY | Facility: CLINIC | Age: 65
End: 2025-04-03
Payer: MEDICARE

## 2025-04-03 ENCOUNTER — OFFICE VISIT (OUTPATIENT)
Dept: HEMATOLOGY/ONCOLOGY | Facility: CLINIC | Age: 65
End: 2025-04-03
Attending: INTERNAL MEDICINE
Payer: MEDICARE

## 2025-04-03 VITALS
OXYGEN SATURATION: 97 % | WEIGHT: 226 LBS | HEART RATE: 75 BPM | DIASTOLIC BLOOD PRESSURE: 75 MMHG | SYSTOLIC BLOOD PRESSURE: 125 MMHG | TEMPERATURE: 98 F | HEIGHT: 63 IN | BODY MASS INDEX: 40.04 KG/M2 | RESPIRATION RATE: 18 BRPM

## 2025-04-03 DIAGNOSIS — C50.912 INVASIVE DUCTAL CARCINOMA OF LEFT BREAST: Primary | ICD-10-CM

## 2025-04-03 DIAGNOSIS — C50.912 INVASIVE DUCTAL CARCINOMA OF LEFT BREAST: ICD-10-CM

## 2025-04-03 DIAGNOSIS — E66.01 CLASS 2 SEVERE OBESITY DUE TO EXCESS CALORIES WITH SERIOUS COMORBIDITY AND BODY MASS INDEX (BMI) OF 37.0 TO 37.9 IN ADULT: ICD-10-CM

## 2025-04-03 DIAGNOSIS — E66.812 CLASS 2 SEVERE OBESITY DUE TO EXCESS CALORIES WITH SERIOUS COMORBIDITY AND BODY MASS INDEX (BMI) OF 37.0 TO 37.9 IN ADULT: ICD-10-CM

## 2025-04-03 DIAGNOSIS — E66.9 OBESITY (BMI 35.0-39.9 WITHOUT COMORBIDITY): ICD-10-CM

## 2025-04-03 DIAGNOSIS — Z17.421 TRIPLE NEGATIVE BREAST CANCER: ICD-10-CM

## 2025-04-03 DIAGNOSIS — C50.912 INVASIVE DUCTAL CARCINOMA OF BREAST, FEMALE, LEFT: ICD-10-CM

## 2025-04-03 DIAGNOSIS — C50.919 TRIPLE NEGATIVE BREAST CANCER: ICD-10-CM

## 2025-04-03 DIAGNOSIS — Z80.1 FAMILY HISTORY OF LUNG CANCER: ICD-10-CM

## 2025-04-03 DIAGNOSIS — Z80.41 FAMILY HISTORY OF OVARIAN CANCER: ICD-10-CM

## 2025-04-03 DIAGNOSIS — Z78.0 POSTMENOPAUSAL: Primary | ICD-10-CM

## 2025-04-03 PROCEDURE — 99214 OFFICE O/P EST MOD 30 MIN: CPT | Mod: PBBFAC | Performed by: INTERNAL MEDICINE

## 2025-04-03 PROCEDURE — 99214 OFFICE O/P EST MOD 30 MIN: CPT | Mod: S$PBB,,, | Performed by: INTERNAL MEDICINE

## 2025-04-03 NOTE — PROGRESS NOTES
History:  Past Medical History:   Diagnosis Date    Breast cancer     left    Hyperlipidemia     Menopause 4/27/20020     Past Surgical History:   Procedure Laterality Date    ADENOIDECTOMY      CHOLECYSTECTOMY  1998    COLONOSCOPY      with biopsy    INSERTION OF TUNNELED CENTRAL VENOUS CATHETER (CVC) WITH SUBCUTANEOUS PORT N/A 3/24/2025    Procedure: [id number];  Surgeon: Vik Lynch Jr., MD;  Location: [facility];  Service: General;  Laterality: N/A;  Likely right    OCCLUSION, FALLOPIAN TUBE, USING DEVICE, BY VAGINAL OR SUPRAPUBIC APPROACH Bilateral     TONSILLECTOMY  1975      Social History     Socioeconomic History    Marital status:    Tobacco Use    Smoking status: Never     Passive exposure: Never    Smokeless tobacco: Never   Substance and Sexual Activity    Alcohol use: Not Currently     Comment: I ONLY WINE MAYBE TWICE year    Drug use: Never    Sexual activity: Not Currently     Partners: Male     Social Drivers of Health     Financial Resource Strain: Low Risk  (12/5/2024)    Overall Financial Resource Strain (CARDIA)     Difficulty of Paying Living Expenses: Not very hard   Food Insecurity: No Food Insecurity (12/5/2024)    Hunger Vital Sign     Worried About Running Out of Food in the Last Year: Never true     Ran Out of Food in the Last Year: Never true   Transportation Needs: No Transportation Needs (3/20/2024)    PRAPARE - Transportation     Lack of Transportation (Medical): No     Lack of Transportation (Non-Medical): No   Physical Activity: Unknown (12/5/2024)    Exercise Vital Sign     Days of Exercise per Week: 7 days   Stress: No Stress Concern Present (12/5/2024)    [employer] - Occupational Stress Questionnaire     Feeling of Stress : Only a little   Housing Stability: Unknown (3/20/2024)    Housing Stability Vital Sign     Unable to Pay for Housing in the Last Year: No     Unstable Housing in the Last Year: No      Family History    Problem Relation Name Age of Onset    Heart disease Mother Zaridarlin Mclean     Diabetes Mother Zari Jett     Alzheimer's disease Mother Zari Jett     Stroke Mother Zari Jett     Arthritis Mother Zari Jett     Cancer Father LoboEarjacob         Lung cancer    Heart failure Father HarrisEarl     Cancer Sister Rakesh         ovarian    Diabetes Sister Rakesh     Rashes / Skin problems Sister Rakesh     Diabetes Brother Rakesh SLETTY     Epilepsy Brother Rakesh SLETTY     Heart failure Maternal Grandmother Mary     Diabetes Maternal Grandmother Mary     Osteoarthritis Maternal Aunt Valentina     Cancer Sister Jeannine Jolley         Ovarian  cancer    Diabetes Sister Jeannine  Jolley     Ovarian cancer Sister Jeannine  Jolley     Diabetes Brother DOREEN JETT     Rashes / Skin problems Brother DOREEN JETT     Seizures Brother DOREEN JETT     Stroke Brother DOREEN JETT     Arthritis Brother Brando Jett     Diabetes Brother DOREEN JETT     Rashes / Skin problems Brother DOREEN JETT     Seizures Brother DOREEN JETT     Stroke Brother DOREEN JETT     Arthritis Brother Brando Jett         Reason for Follow-up:  -IDC left breast, biopsy 01/27/2025, triple negative, overall grade 3; cT2 cN0 MX, AJCC anatomic stage at least IIA, clinical prognostic stage at least IIB  -postmenopausal  -father experienced lung cancer; sister experienced ovarian cancer  -obesity    History of Present Illness:   Triple negative breast cancer      Oncologic/Hematologic History:  Oncology History   Invasive ductal carcinoma of left breast   1/27/2025 Cancer Staged    Staging form: Breast, AJCC 8th Edition  - Clinical stage from 1/27/2025: Stage IIB (cT2, cN0, cM0, G3, ER-, RI-, HER2-)     2/22/2025 Initial Diagnosis    Invasive ductal carcinoma of left breast     Past medical history: Dyslipidemia; postmenopausal; sensorineural hearing loss; obesity; psoriatic arthritis; JENNIFER positive; bilateral tinnitus  Procedure/surgical history: Adenoidectomy;  cholecystectomy; colonoscopy; tonsillectomy    -2025:  Says that she underwent colonoscopy with [facility] 3 years ago, and that it showed 1 polyp; apparently, the recommended repeat colonoscopy in 5 years  Social history:  .  Lives in [place].  Has 1 child.  A 41-year-old son  from complications of diabetes mellitus.  She cleans houses.  She smoked <1 ppd x2 years; quit 40 years ago.  No alcohol or illicit drug abuse.  Family history: Father experienced lung cancer at age 84 (was a smoker); sister experienced ovarian cancer at age 54; maternal aunt experienced unknown kind of cancer in her 60s  Health maintenance:   -2023:  Thin prep cervical Pap smear:  NILM, high-risk HPV negative  -2025:  Says that she underwent colonoscopy with [facility] 3 years ago, and that it showed 1 polyp; apparently, the recommended repeat colonoscopy in 5 years  Menstrual/Ob gyn history: Menarche at age 13; LMP at age 60; no menopausal symptoms except for irritability x3-4 years; never took hormone replacement therapy; last OCP use at age 36 (used OCPs for 3 years); for pregnancies; 2 live births; 2 miscarriages;  her 2nd child for 6 months; 1st child at age 22      64-year-old lady, referred from [facility], with invasive ductal carcinoma.    Investigations reviewed:  -2023:  Bilateral screening mammogram (comparison:  10/23/2013 mammogram):  BI-RADS category 0-incomplete  -2025:  bilateral diagnostic mammogram, limited ultrasound left breast:  HISTORY: 64-year-old female presents for further evaluation of a mass in the upper-outer quadrant of the left breast recalled from screening at [facility] on 2023.    Overall study BI-RADS: 5-highly suggestive of malignancy (per mammogram, up to 23 mm mass; per ultrasound, 23 x 17 x 22 mm mass)  -2025:  left breast mass 2 o'clock, 7 cm from nipple, ultrasound-guided needle core  biopsy:  IDC, overall grade 3  -ER negative (0%), MD negative (0%), HER2 negative (score 0), Ki-67 high proliferation (65%)    02/24/2025:    Pleasant, healthy-appearing lady who presents for initial medical oncology consultation, accompanied by .  In no acute discomfort.    Overall, feels well and healthy.  Great appetite.  Never noticed any breast lumps, breast pain, nipple discharge, skin or nipple changes.  Never noticed regional lymphadenopathy.    No anorexia, unintentional weight loss, weakness, fatigue, unusual headaches, focal neurological symptoms, chest pain, cough, dyspnea, abdominal pain, nausea, vomiting, GI bleeding, change in bowel habits, bone pains, any urinary problems, postmenopausal spotting, etc..  ECOG 0.    Interval History:  [No matching plan found]   [No matching plan found]     04/03/2025:   -03/05/2025:  Pre chemotherapy TTE: LVEF 55-60%  -03/06/2025: Staging CTs C/A/P with contrast:  Lobulated left breast nodule consistent with patient's known primary breast malignancy with no evidence of metastatic disease on this exam.  -03/06/2025:  Staging whole-body nuclear medicine bone scan: No bone metastases  -03/13/2025: Bilateral breast MRI with and without contrast: Left breast lesion #1, 2.4 x 2.2 x 2.9 cm, biopsy-proven malignancy, BI-RADS 6; no suspicious left axillary or internal mammary adenopathy; no evidence of malignancy in right breast  -03/24/2025:  Left IJ MediPort placed  -03/24/2025: CBC unremarkable; CMP unremarkable  Presents for a follow-up visit, accompanied by a male .  In no acute discomfort.  She feels that the breast lump is enlarging.  No pain.  No nipple discharge.  No skin changes.  No ulceration or satellite nodules.  No regional lymphadenopathy.  No weakness, fatigue, malaise, unusual headaches, focal neurological symptoms, chest pain, cough, dyspnea, abdominal pain, nausea, vomiting, change in bowel habits, GI bleeding, bone pains, etc..  ECOG 0.  We  discussed all labs and scans in detail.    Immunization History   Administered Date(s) Administered    COVID-19, MRNA, LN-S, PF (Pfizer) (Purple Cap) 07/17/2021, 08/07/2021    Influenza (FLUBLOK) - Quadrivalent - Recombinant - PF *Preferred* (egg allergy) 11/29/2020    Influenza - Quadrivalent - MDCK - PF 01/03/2022, 11/30/2023    Influenza - Quadrivalent - PF *Preferred* (6 months and older) 11/04/2017, 09/23/2022    Influenza - Trivalent - Afluria, Fluzone MDV 11/07/2011    Influenza - Trivalent - Fluarix, Flulaval, Fluzone, Afluria - PF 11/07/2011, 12/15/2014, 11/16/2016, 11/04/2017, 11/29/2020, 01/03/2022    Pneumococcal Conjugate - 20 Valent 02/24/2025    Tdap 08/24/2023    Zoster Recombinant 06/17/2022, 09/23/2022     Allergies as of 04/03/2025    (No Known Allergies)     Medications:  Medications Ordered Prior to Encounter[1]    Review of Systems:   All systems reviewed and found to be negative except for the symptoms detailed above    Physical Examination:   VITAL SIGNS:   Vitals:    04/03/25 0830   BP: 125/75   Pulse: 75   Resp: 18   Temp: 98.1 °F (36.7 °C)       GENERAL:  In no apparent distress.    HEAD:  No signs of head trauma.  EYES:  Pupils are equal.  Extraocular motions intact.    EARS:  Hearing grossly intact.  MOUTH:  Oropharynx is normal.   NECK:  No adenopathy, no JVD.     CHEST:  Chest with clear breath sounds bilaterally.  No wheezes, rales, rhonchi.    CARDIAC:  Regular rate and rhythm.  S1 and S2, without murmurs, gallops, rubs.  VASCULAR:  No Edema.  Peripheral pulses normal and equal in all extremities.  ABDOMEN:  Soft, without detectable tenderness.  No sign of distention.  No   rebound or guarding, and no masses palpated.   Bowel Sounds normal.  MUSCULOSKELETAL:  Good range of motion of all major joints. Extremities without clubbing, cyanosis or edema.    NEUROLOGIC EXAM:  Alert and oriented x 3.  No focal sensory or strength deficits.   Speech normal.  Follows commands.  PSYCHIATRIC:   Mood normal.    Assessment:  Problem List Items Addressed This Visit       Class 2 severe obesity due to excess calories with serious comorbidity and body mass index (BMI) of 37.0 to 37.9 in adult (Chronic)    Triple negative breast cancer    Invasive ductal carcinoma of left breast    Postmenopausal - Primary    Obesity (BMI 35.0-39.9 without comorbidity)    Family history of ovarian cancer    Family history of lung cancer       Orders for 04/03/2025:   Genetic testing  Start chemotherapy ASAP  Pharmacy are in the process of making chemotherapy plan  Chemo teaching with nursing staff ASAP  Follow-up with NP within a week  Re-stage with bilateral breast MRI with and without contrast after completion of 4 cycles of neoadjuvant chemotherapy     Above discussed with the patient.  All questions answered.    Discussed labs and scans and gave her copies of relevant results.  She will have a formal chemotherapy teaching class with our nursing staff.  She understands and agrees with this plan.  ====================================    #IDC left breast, triple negative:  -screening mammogram 12/13/2023: BI-RADS 0  -diagnostic mammogram and ultrasound 01/27/2025:  BI-RADS: 5  -core biopsy 01/27/2025:  IDC, overall grade 3, triple negative  -ER negative (0%), MO negative (0%), HER2 negative (score 0), Ki-67 high proliferation (65%)  -radiologically, per mammogram and ultrasound 01/27/2025:  23 x 17 x 22 mm mass; no lymphadenopathy  -02/24/2025: Initial consultation: ECOG 0  -pre chemotherapy TTE 03/05/2025:  LVEF 55-60%  -staging CTs C/A/P and bone scan 03/06/2025: No distant metastases  -breast MRI 03/13/2025:  Left breast lesion 2.4 x 2.2 x 2.9 cm  -left IJ MediPort placed 03/24/2025  -left breast tumor 23 mm per mammogram and ultrasound, 2.9 cm per MRI, triple negative; G3  -cT2 cN0 MX, AJCC anatomic stage at least IIA, clinical prognostic stage at least IIB  >>>  Plan:  Postmenopausal  Triple negative breast cancers;  ordered genetic testing and counseling 02/24/2025  Also, family history of ovarian cancer in sister; needs genetic testing  By definition, operable breast cancer  For TNBC, cT2, preoperative systemic therapy will be preferred  In any case, long-term outcomes are the same when patients are given chemotherapy preoperatively compared with postoperatively  We will restage with breast MRI after 4 cycles of chemotherapy, then, after completion of 8 cycles of chemotherapy    Clinical prognostic stage at least IIB, therefore, preoperative followed by adjuvant:    Preoperative pembrolizumab/carboplatin/Taxol, followed by   preoperative pembrolizumab/Cyclophosphamide/doxorubicin or epirubicin, followed by   adjuvant pembrolizumab (category 1 recommendation)  (see below)  -If residual disease after preoperative therapy with taxane/alkylator/anthracycline based chemotherapy, then, capecitabine (patients in the Giacomo trial did not receive capecitabine, therefore, there are no data on sequencing or to guide selection of one agent over the other, i.e., capecitabine versus olaparib if BRCA1/2 mutation positive)  -If germline BRCA1/2 mutation positive, then, adjuvant olaparib if residual disease after neoadjuvant chemotherapy  (patients in the Giacomo trial did not receive capecitabine, therefore, there are no data on sequencing or to guide selection of one agent over the other)    Preoperative systemic therapy  Pembrolizumab 200 mg IV day 1  Paclitaxel 80 mg/m² IV days 1, 8, 15  Carboplatin AUC 5 IV day 1  (Cycle every 21 days x 4 cycles) (cycles 1-4)  >>>  Followed by:  -Pembrolizumab 200 mg IV day 1  -Doxorubicin 60 mg/m² IV day 1  -Cyclophosphamide 600 mg/m² IV day 1  (Cycle every 21 days for 4 cycles (cycles 5-8)  Followed by:  Adjuvant:  Pembrolizumab 200 mg IV day 1  Cycle every 21 days x 9 cycles    Response assessment to preoperative systemic therapy:  Physical examination, mammogram, and/or breast ultrasound and/or  breast MRI  MRI is more accurate than mammography for assessing tumor response to preoperative therapy    After preoperative systemic therapy, if BCS and axillary staging, then:  Adjuvant systemic therapy +whole breast radiotherapy    After preoperative systemic therapy, if mastectomy and axillary staging, then:  1. Adjuvant systemic therapy +postmastectomy radiotherapy  -if any ypN+: PMRT to chest wall +comprehensive RNI with inclusion of any portion of the undetected axilla at risk  -if cN0, ypN0, then:  If axilla was assessed by SLNB or axillary dissection, then, no PMRT    Adjuvant systemic therapy after preoperative systemic therapy:  -consider adjuvant bisphosphonate therapy for risk reduction of distant metastases for 3-5 years in postmenopausal patients with a high-risk node-negative or node positive tumors; we will consider in this patient's; she will need dental evaluation and preventive Dentistry prior, in order to prevent/minimize likelihood of osteonecrosis of the jaw  1. If ypT0N0 or PCR, then:  Adjuvant pembrolizumab (if pembrolizumab containing regimen was given preoperatively)    2. If ypT1-4, N0 or ypN=/>1, then:  -adjuvant pembrolizumab; and/or  -adjuvant capecitabine (6-8 cycles); and/or  -adjuvant olaparib x1 year if germline BRCA1/2 mutation positive (category 1 recommendation)    #Family history of lung cancer and ovarian cancer:  Father experienced lung cancer at age 84 (was a smoker); sister experienced ovarian cancer at age 54; maternal aunt experienced unknown kind of cancer in her 60s  >>>  -have ordered genetic testing and counseling    #Obesity:  -12/12/2024: BMI 37.2  -02/13/2025: BMI 39.4    History of dyslipidemia, bilateral tinnitus, sensorineural hearing loss, psoriatic arthritis       Follow-up:  No follow-ups on file.      Answers submitted by the patient for this visit:  Review of Systems Questionnaire (Submitted on 2/17/2025)  appetite change : No  unexpected weight change:  No  mouth sores: No  visual disturbance: No  cough: Yes  shortness of breath: No  chest pain: No  diarrhea: No  frequency: No  back pain: No  rash: No  headaches: No  adenopathy: No  nervous/ anxious: No      Answers submitted by the patient for this visit:  Review of Systems Questionnaire (Submitted on 3/27/2025)  appetite change : No  unexpected weight change: No  mouth sores: No  visual disturbance: No  cough: No  shortness of breath: No  chest pain: No  abdominal pain: No  diarrhea: No  frequency: No  back pain: No  rash: No  headaches: No  adenopathy: No  nervous/ anxious: No         [1]   Current Outpatient Medications on File Prior to Visit   Medication Sig Dispense Refill    acetaminophen (TYLENOL) 500 MG tablet Take 1 tablet (500 mg total) by mouth every 6 (six) hours as needed for Pain. 10 tablet 0    cholecalciferol, vitamin D3, 325 mcg (13,000 unit) Cap capsule Take by mouth 2 (two) times a day.      herbal drugs Tab Take by mouth. Over the counter herbal supplement.  Female Comfort by [employer]      multivitamin capsule Take 1 capsule by mouth once daily.      omega-3 acid ethyl esters (LOVAZA) 1 gram capsule Take 2 g by mouth 2 (two) times daily.      THYROID ORAL Take by mouth. Over the counter herbal supplement, by [employer] Thyroid Support      fluticasone propionate (FLONASE) 50 mcg/actuation nasal spray 2 sprays (100 mcg total) by Each Nostril route once daily. 18.2 mL 0     No current facility-administered medications on file prior to visit.

## 2025-04-03 NOTE — TELEPHONE ENCOUNTER
Contacted regarding referral to Hem/Onc SW department for a distress score of 7 from Dr. Fox on 2/24. Pts most recent distress score on 4/2 was at 0/10. Pt stated her distress score prior to speaking with Dr. Fox on 2/24 was at 7 due to treatment questions and concerns, but her anxiety was lowered at the same appt after speaking with Dr. Fox about treatment. SW intern explained the reason for contacting pt and therapy services Jazmine foster. Pt denied speaking with LCSW. SW intern will remain available.      4/2/2025     5:34 PM 2/24/2025    10:25 AM   DISTRESS SCREENING   Distress Score 0 - No Distress 7   Practical Concerns Taking care of others    Social Concerns None of these    Emotional Concerns None of these    Spiritual or Sikhism Concerns None of these    Physical Concerns None of these

## 2025-04-03 NOTE — Clinical Note
Orders for 04/03/2025:  Genetic testing Start chemotherapy [facility] are in the process of making chemotherapy plan Chemo teaching with nursing staff ASAP Follow-up with NP within a week Re-stage with bilateral breast MRI with and without contrast after completion of 4 cycles of neoadjuvant chemotherapy

## 2025-04-07 ENCOUNTER — LAB VISIT (OUTPATIENT)
Dept: LAB | Facility: HOSPITAL | Age: 65
End: 2025-04-07
Payer: MEDICARE

## 2025-04-07 DIAGNOSIS — Z00.00 WELL ADULT EXAM: ICD-10-CM

## 2025-04-07 DIAGNOSIS — R79.9 ABNORMAL FINDING OF BLOOD CHEMISTRY, UNSPECIFIED: ICD-10-CM

## 2025-04-08 ENCOUNTER — TELEPHONE (OUTPATIENT)
Dept: INTERNAL MEDICINE | Facility: CLINIC | Age: 65
End: 2025-04-08
Payer: MEDICARE

## 2025-04-08 ENCOUNTER — RESULTS FOLLOW-UP (OUTPATIENT)
Dept: INTERNAL MEDICINE | Facility: CLINIC | Age: 65
End: 2025-04-08

## 2025-04-08 NOTE — TELEPHONE ENCOUNTER
----- Message from ANGI Figueroa sent at 4/8/2025  8:49 AM CDT -----  Please call and inquire if pt is experiencing any urinary burning, urgency, frequency, back pain or fever. If she is I am going to send and antibiotic to her pharmacy. If not, encouraged her to   ensure adequate water intake and inform clinic if she develops any symptoms of UTI. Let me know what she says, thanks!  ----- Message -----  From: Lab, Background User  Sent: 4/7/2025   2:13 PM CDT  To: ANGI Barakat

## 2025-04-08 NOTE — TELEPHONE ENCOUNTER
Placed all to patient to assess if she is having any urinary symptoms patient at hospital with son. Left a message with her daughter to have patient return call to clinic.

## 2025-04-08 NOTE — PROGRESS NOTES
Please call and inquire if pt is experiencing any urinary burning, urgency, frequency, back pain or fever. If she is I am going to send and antibiotic to her pharmacy. If not, encouraged her to ensure adequate water intake and inform clinic if she develops any symptoms of UTI. Let me know what she says, thanks!

## 2025-04-10 ENCOUNTER — LAB VISIT (OUTPATIENT)
Dept: LAB | Facility: HOSPITAL | Age: 65
End: 2025-04-10
Payer: MEDICARE

## 2025-04-10 ENCOUNTER — RESULTS FOLLOW-UP (OUTPATIENT)
Dept: INTERNAL MEDICINE | Facility: CLINIC | Age: 65
End: 2025-04-10

## 2025-04-10 ENCOUNTER — OFFICE VISIT (OUTPATIENT)
Dept: HEMATOLOGY/ONCOLOGY | Facility: CLINIC | Age: 65
End: 2025-04-10
Payer: MEDICARE

## 2025-04-10 VITALS
BODY MASS INDEX: 40.67 KG/M2 | OXYGEN SATURATION: 98 % | TEMPERATURE: 98 F | SYSTOLIC BLOOD PRESSURE: 142 MMHG | DIASTOLIC BLOOD PRESSURE: 74 MMHG | HEIGHT: 62 IN | WEIGHT: 221 LBS | HEART RATE: 83 BPM

## 2025-04-10 DIAGNOSIS — C50.912 INVASIVE DUCTAL CARCINOMA OF LEFT BREAST: Primary | ICD-10-CM

## 2025-04-10 DIAGNOSIS — Z00.00 WELL ADULT EXAM: ICD-10-CM

## 2025-04-10 DIAGNOSIS — R79.9 ABNORMAL FINDING OF BLOOD CHEMISTRY, UNSPECIFIED: ICD-10-CM

## 2025-04-10 LAB
25(OH)D3+25(OH)D2 SERPL-MCNC: 41 NG/ML (ref 30–80)
ALBUMIN SERPL-MCNC: 3.8 G/DL (ref 3.4–4.8)
ALBUMIN/GLOB SERPL: 1 RATIO (ref 1.1–2)
ALP SERPL-CCNC: 130 UNIT/L (ref 40–150)
ALT SERPL-CCNC: 23 UNIT/L (ref 0–55)
ANION GAP SERPL CALC-SCNC: 6 MEQ/L
AST SERPL-CCNC: 21 UNIT/L (ref 11–45)
BASOPHILS # BLD AUTO: 0.05 X10(3)/MCL
BASOPHILS NFR BLD AUTO: 0.9 %
BILIRUB SERPL-MCNC: 0.5 MG/DL
BUN SERPL-MCNC: 17.1 MG/DL (ref 9.8–20.1)
CALCIUM SERPL-MCNC: 9.6 MG/DL (ref 8.4–10.2)
CHLORIDE SERPL-SCNC: 108 MMOL/L (ref 98–107)
CHOLEST SERPL-MCNC: 224 MG/DL
CHOLEST/HDLC SERPL: 4 {RATIO} (ref 0–5)
CO2 SERPL-SCNC: 28 MMOL/L (ref 23–31)
CREAT SERPL-MCNC: 0.81 MG/DL (ref 0.55–1.02)
CREAT/UREA NIT SERPL: 21
EOSINOPHIL # BLD AUTO: 0.11 X10(3)/MCL (ref 0–0.9)
EOSINOPHIL NFR BLD AUTO: 2 %
ERYTHROCYTE [DISTWIDTH] IN BLOOD BY AUTOMATED COUNT: 13.1 % (ref 11.5–17)
EST. AVERAGE GLUCOSE BLD GHB EST-MCNC: 108.3 MG/DL
GFR SERPLBLD CREATININE-BSD FMLA CKD-EPI: >60 ML/MIN/1.73/M2
GLOBULIN SER-MCNC: 3.9 GM/DL (ref 2.4–3.5)
GLUCOSE SERPL-MCNC: 96 MG/DL (ref 82–115)
HBA1C MFR BLD: 5.4 %
HCT VFR BLD AUTO: 43.2 % (ref 37–47)
HDLC SERPL-MCNC: 64 MG/DL (ref 35–60)
HGB BLD-MCNC: 14.4 G/DL (ref 12–16)
IMM GRANULOCYTES # BLD AUTO: 0.01 X10(3)/MCL (ref 0–0.04)
IMM GRANULOCYTES NFR BLD AUTO: 0.2 %
LDLC SERPL CALC-MCNC: 138 MG/DL (ref 50–140)
LYMPHOCYTES # BLD AUTO: 2.19 X10(3)/MCL (ref 0.6–4.6)
LYMPHOCYTES NFR BLD AUTO: 39.8 %
MCH RBC QN AUTO: 30.3 PG (ref 27–31)
MCHC RBC AUTO-ENTMCNC: 33.3 G/DL (ref 33–36)
MCV RBC AUTO: 90.9 FL (ref 80–94)
MONOCYTES # BLD AUTO: 0.38 X10(3)/MCL (ref 0.1–1.3)
MONOCYTES NFR BLD AUTO: 6.9 %
NEUTROPHILS # BLD AUTO: 2.76 X10(3)/MCL (ref 2.1–9.2)
NEUTROPHILS NFR BLD AUTO: 50.2 %
NRBC BLD AUTO-RTO: 0 %
PLATELET # BLD AUTO: 248 X10(3)/MCL (ref 130–400)
PMV BLD AUTO: 11.2 FL (ref 7.4–10.4)
POTASSIUM SERPL-SCNC: 4.1 MMOL/L (ref 3.5–5.1)
PROT SERPL-MCNC: 7.7 GM/DL (ref 5.8–7.6)
RBC # BLD AUTO: 4.75 X10(6)/MCL (ref 4.2–5.4)
SODIUM SERPL-SCNC: 142 MMOL/L (ref 136–145)
T4 FREE SERPL-MCNC: 0.86 NG/DL (ref 0.7–1.48)
TRIGL SERPL-MCNC: 111 MG/DL (ref 37–140)
TSH SERPL-ACNC: 5.61 UIU/ML (ref 0.35–4.94)
VLDLC SERPL CALC-MCNC: 22 MG/DL
WBC # BLD AUTO: 5.5 X10(3)/MCL (ref 4.5–11.5)

## 2025-04-10 PROCEDURE — 80053 COMPREHEN METABOLIC PANEL: CPT

## 2025-04-10 PROCEDURE — 84439 ASSAY OF FREE THYROXINE: CPT

## 2025-04-10 PROCEDURE — 84443 ASSAY THYROID STIM HORMONE: CPT

## 2025-04-10 PROCEDURE — 82306 VITAMIN D 25 HYDROXY: CPT

## 2025-04-10 PROCEDURE — 80061 LIPID PANEL: CPT

## 2025-04-10 PROCEDURE — 36415 COLL VENOUS BLD VENIPUNCTURE: CPT

## 2025-04-10 PROCEDURE — 99213 OFFICE O/P EST LOW 20 MIN: CPT | Mod: PBBFAC

## 2025-04-10 PROCEDURE — 83036 HEMOGLOBIN GLYCOSYLATED A1C: CPT

## 2025-04-10 PROCEDURE — 85025 COMPLETE CBC W/AUTO DIFF WBC: CPT

## 2025-04-10 RX ORDER — DEXAMETHASONE 4 MG/1
8 TABLET ORAL DAILY
Qty: 24 TABLET | Refills: 2 | Status: SHIPPED | OUTPATIENT
Start: 2025-04-11

## 2025-04-10 RX ORDER — OLANZAPINE 5 MG/1
5 TABLET ORAL NIGHTLY
Qty: 30 TABLET | Refills: 1 | Status: SHIPPED | OUTPATIENT
Start: 2025-04-10

## 2025-04-10 RX ORDER — ONDANSETRON 8 MG/1
8 TABLET, ORALLY DISINTEGRATING ORAL EVERY 8 HOURS PRN
Qty: 60 TABLET | Refills: 5 | Status: SHIPPED | OUTPATIENT
Start: 2025-04-10

## 2025-04-10 NOTE — PROGRESS NOTES
THERAPY EDUCATION: PEMBROLIZUMAB + CARBOPLATIN + PACLITAXEL      Chief Complaint: Therapy Education      Diagnosis:     Invasive ductal carcinoma of left breast        Current Treatment: Keytruda + Carboplatin + Paclitaxel to start on 4/17/25     Interval History      4/10/25: Mrs. Disla presents to the clinic by herself for therapy education.Patient reports no major complaints at today's visit. Denies fever, chills, N/V/D, constipation, recent infection, chest pain or unexplained bleeding or bruising.    PLAN  -Baseline ECHO completed on 3/5/25  -Mediport placement completed on 3/24/25.   -Plans to start Keytruda + Carboplatin with weekly Paclitaxel on 4/17/25. Patient understood that she will receive premedications given 30 minutes prior to each treatment to help prevent nausea.  -Antiemetic regimen schedule given and calendar made for guidance    Dexamethasone- Take 2 tablets by mouth daily on Days 2-4 of each treatment.    Olanzapine- Take 1 tablet by mouth daily in the evening on Days 1-4 of each treatment   -Zofran PRN prescribed for at home with instructions to be taken by mouth every 8 hours as needed for nausea. If not working, Compazine can be prescribed as 2nd choice.    -OTC Imodium AD recommended for diarrhea (4-5 BMs a day). Take 2 tablets after the first loose bowel movement, and 1 tablet after each loose bowel movement after the first dose has been taken. No more than 4 tablets should be taken in any 24-hour period. If not working, Lomotil can be prescribed as 2nd choice.    -Mouth sore prevention with 1-quart warm water with 1 tsp of baking soda and salt and alcohol-free mouthwash.   -Emphasized adequate hand-hygiene and limited contact with people who are sick.   -Monitor and notify any bleeding in urine, stool, or sputum.  As well as unusual bleeding or bruising and stomach pain.   - Emphasized hydration with 4 16 oz bottles of water a day.    -Importance of moisturizing with fragrance free  lotion to prevent skin rash.  -Call clinic if fever >100.4, shakes or chills, shortness of breath, chest pain, uncontrolled vomiting or diarrhea, pain and tingling in the chest or arm, or just not feeling well.    -Plans to RTC on 4/24/25 for same day labs and toxicity check with NP.    DISCUSSION:    1.  A total of 60 minutes were spent in counseling today, in which 100% were face-to-face.  At today's therapy teaching session, we discussed the patient's cancer diagnosis as well as planned therapy regimen, protocol, side effects and toxicities.  A handout of each therapeutic agent in the regimen was provided and reviewed in detail.    2.  The following side effects were discussed but not limited to:    Carboplatin Side Effects:  Allergic Reactions  Breathing Problems  Bruising  Chest Pain  Chills  Cough  Fever  Hair Loss  High Blood Pressure  Infection  Itching  Low Blood Counts  Metallic Taste  Nausea  Nosebleeds  Numbness  Pain  Rash  Swelling  Tingling  Vomiting    Paclitaxel Side Effects:  Allergic Reactions  Breathing Problems  Bruising  Chills  Cough  Diarrhea  Feeling Faint  Fever  Hair Loss  Infection  Injury  Itching  Joint Pain  Low Blood Pressure  Mouth Sores  Nausea  Numbness  Pain  Rash  Swelling  Tingling  Vomiting    Pembrolizumab Side Effects:  Allergic Reactions  Breathing Problems  Chest Pain  Chills  Confusion  Constipation  Cough  Diarrhea  Dizziness  Dry Mouth  Dry Skin  Feeling Faint  Fever  Flushing  Hair Loss  Headache  Infection  Injury  Itching  Joint Pain  Low Blood Counts  Muscle Pain  Nausea  Numbness  Pain  Rash  Swelling  Tingling  Weakness  Weight Loss                a.  Discussed the risk of infection while on therapy related to pancytopenia, specifically a decrease in their white blood cell count.  Instructed to contact our office for temperature >100.4 F, chills, sudden onset cough or shortness of breath, symptoms of a urinary tract infection.                b.  Discussed the risk  of anemia. Instructed to contact our office for dizziness, heart palpitations, or extreme or sudden changes in weakness.                c.  Discussed the risk of thrombocytopenia, which increases the risk of bruising or bleeding.  Instructed the patient to contact our office for spontaneous signs of bleeding, including nose bleeds, bleeding from the gums or mouth, blood in sputum, urine or stool and unusual or excessive bruising or rash.                d.  Discussed GI side effects including weight changes, changes in appetite, altered sense of taste, stomatitis, nausea, vomiting, diarrhea, constipation, and heartburn.                e.  Discussed  side effects including painful urination, changes in the amount of urination, possible urine color changes.  Discussed fertility issues and to prevent  pregnancy if of child bearing age.                f.  Discussed neurological side effects including the risk of peripheral neuropathy, either temporary or permanent.                g.  Discussed the potential for skin, hair, and nail changes.       3.  Instructed to contact our office for discussion of medication changes, the addition of vitamin and/or herbal supplementation as they may interact with some chemotherapy agents.    4.  Discussed dietary modifications and the need to maintain adequate caloric intake and proper oral hydration.  Recommended 64 ounces of fluid per day.    5.  Discussed anti-emetic protocol and bowel regimen protocol.    6.  Office contact information given including after hours number.  Discussed there is an oncologist on call 24/7, 365 days including weekends.  Provided primary nurse's information .    7.  In summary, the patient is in agreement with the plan of care.  Questions appeared to be answered to their satisfaction. Consented the patient to the treatment plan and the patient was educated on the planned duration of the treatment and schedule of the treatment administration. Copy to be  scanned into the chart.    All questions answered to the satisfaction of the patient and family.     Follow up appointments given to michell.         GROVER WHITLEY-NANO  PATIENT EDUCATOR  [facility]    Answers submitted by the patient for this visit:  Review of Systems Questionnaire (Submitted on 4/3/2025)  appetite change : No  unexpected weight change: No  mouth sores: No  visual disturbance: No  cough: No  shortness of breath: No  chest pain: No  abdominal pain: No  diarrhea: No  frequency: No  back pain: No  rash: No  headaches: No  adenopathy: No  nervous/ anxious: No

## 2025-04-14 ENCOUNTER — OFFICE VISIT (OUTPATIENT)
Dept: INTERNAL MEDICINE | Facility: CLINIC | Age: 65
End: 2025-04-14
Payer: MEDICARE

## 2025-04-14 VITALS
DIASTOLIC BLOOD PRESSURE: 67 MMHG | RESPIRATION RATE: 18 BRPM | HEIGHT: 62 IN | BODY MASS INDEX: 41.86 KG/M2 | OXYGEN SATURATION: 98 % | SYSTOLIC BLOOD PRESSURE: 103 MMHG | WEIGHT: 227.5 LBS | HEART RATE: 76 BPM

## 2025-04-14 DIAGNOSIS — E78.2 MIXED HYPERLIPIDEMIA: Primary | Chronic | ICD-10-CM

## 2025-04-14 DIAGNOSIS — E03.8 SUBCLINICAL HYPOTHYROIDISM: ICD-10-CM

## 2025-04-14 DIAGNOSIS — L40.50 PSORIATIC ARTHRITIS: ICD-10-CM

## 2025-04-14 PROBLEM — E66.812 CLASS 2 SEVERE OBESITY DUE TO EXCESS CALORIES WITH SERIOUS COMORBIDITY AND BODY MASS INDEX (BMI) OF 37.0 TO 37.9 IN ADULT: Chronic | Status: RESOLVED | Noted: 2024-02-26 | Resolved: 2025-04-14

## 2025-04-14 PROBLEM — E66.9 OBESITY (BMI 35.0-39.9 WITHOUT COMORBIDITY): Status: RESOLVED | Noted: 2025-02-22 | Resolved: 2025-04-14

## 2025-04-14 PROBLEM — E66.01 CLASS 2 SEVERE OBESITY DUE TO EXCESS CALORIES WITH SERIOUS COMORBIDITY AND BODY MASS INDEX (BMI) OF 37.0 TO 37.9 IN ADULT: Chronic | Status: RESOLVED | Noted: 2024-02-26 | Resolved: 2025-04-14

## 2025-04-14 PROCEDURE — 99213 OFFICE O/P EST LOW 20 MIN: CPT | Mod: PBBFAC

## 2025-04-14 PROCEDURE — 99214 OFFICE O/P EST MOD 30 MIN: CPT | Mod: S$PBB,,,

## 2025-04-14 NOTE — PROGRESS NOTES
ANGI Barakat   [facility]  [address] [facility] [address]      PATIENT NAME: Alma Rosa Disla  : 1960  DATE: 25  MRN: [id number]      Patient PCP Information       Provider PCP Type    ANGI Barakat General            Reason for Visit / Chief Complaint: Follow-up (Pt here for 3m f/u with lab review)       History of Present Illness / Problem Focused Workflow     Alma Rosa Disla presents to the clinic with Follow-up (Pt here for 3m f/u with lab review)     64 y.o. White female presents to the clinic. Medical problems include HLD, IDC (left breast), sensorineural hearing loss, obesity, and psoriatic arthritis (formerly seeing Dr. Baum >10 years ago). Followed by Dr. Cornelio Blackwood, GYN and [facility]ology     24  Pt presents for over due follow up, wellness labs . She is agreeable to mammogram order. Last year abn mammogram and left diagnostic recommended which she did not complete. She reports it has happened before and it was scar tissue. No acute complaints, RTC 3 months for wellness.    25  Pt presents for HLD/routine follow up. Labs reviewed, lipids borderline. Subclinical hypothyroidism noted- no symptoms. Positive urine culture but pt denies symptoms, encouraged to increase water intake and inform clinic should she develop symptoms. She is scheduled to start treatment for invasive ductal carcinoma of the left breast. She reports she is feeling well, she intends to continue working as a  during her treatments. No acute complaints. RTC 1 yr for routine follow up, labs prior for HLD and subclinical hypothyroidism          Review of Systems     Review of Systems   Constitutional:  Negative for fatigue, fever and unexpected weight change.   HENT:  Negative for ear pain, hearing loss, trouble swallowing and voice change.    Respiratory:  Negative for cough and shortness of breath.   "  Cardiovascular:  Negative for chest pain and palpitations.   Gastrointestinal:  Negative for abdominal pain, diarrhea and vomiting.   Genitourinary:  Negative for dysuria.   Musculoskeletal:  Negative for gait problem.   Skin:  Negative for rash and wound.   Neurological:  Negative for weakness.   Psychiatric/Behavioral:  Negative for suicidal ideas.          Medications and Allergies     Medications  Current Outpatient Medications   Medication Instructions    dexAMETHasone (DECADRON) 8 mg, Oral, Daily, Take 2 tablets (8 mg) by mouth once daily on days 2,3,4 following chemotherapy.    omega-3 acid ethyl esters (LOVAZA) 2 g, 2 times daily         Allergies  Review of patient's allergies indicates:  No Known Allergies    Physical Examination     Visit Vitals  /67 (BP Location: Left arm, Patient Position: Sitting)   Pulse 76   Resp 18   Ht 5' 2" (1.575 m)   Wt 103.2 kg (227 lb 8 oz)   SpO2 98%   BMI 41.61 kg/m²       Physical Exam  Vitals and nursing note reviewed.   Constitutional:       General: She is not in acute distress.     Appearance: She is not ill-appearing.   HENT:      Head: Normocephalic and atraumatic.      Mouth/Throat:      Mouth: Mucous membranes are moist.      Pharynx: Oropharynx is clear.   Eyes:      General: No scleral icterus.     Extraocular Movements: Extraocular movements intact.      Conjunctiva/sclera: Conjunctivae normal.      Pupils: Pupils are equal, round, and reactive to light.   Neck:      Vascular: No carotid bruit.   Cardiovascular:      Rate and Rhythm: Normal rate and regular rhythm.      Heart sounds: No murmur heard.     No friction rub. No gallop.   Pulmonary:      Effort: Pulmonary effort is normal. No respiratory distress.      Breath sounds: Normal breath sounds. No wheezing, rhonchi or rales.   Abdominal:      General: Abdomen is flat. Bowel sounds are normal. There is no distension.      Palpations: Abdomen is soft. There is no mass.      Tenderness: There is no " abdominal tenderness.   Musculoskeletal:         General: Normal range of motion.      Cervical back: Normal range of motion and neck supple.   Skin:     General: Skin is warm and dry.   Neurological:      General: No focal deficit present.      Mental Status: She is alert.   Psychiatric:         Mood and Affect: Mood normal.           Results     Lab Results   Component Value Date    WBC 5.50 04/10/2025    RBC 4.75 04/10/2025    HGB 14.4 04/10/2025    HCT 43.2 04/10/2025    MCV 90.9 04/10/2025    MCH 30.3 04/10/2025    MCHC 33.3 04/10/2025    RDW 13.1 04/10/2025     04/10/2025    MPV 11.2 (H) 04/10/2025     CMP  Sodium   Date Value Ref Range Status   04/10/2025 142 136 - 145 mmol/L Final     Potassium   Date Value Ref Range Status   04/10/2025 4.1 3.5 - 5.1 mmol/L Final     Chloride   Date Value Ref Range Status   04/10/2025 108 (H) 98 - 107 mmol/L Final     CO2   Date Value Ref Range Status   04/10/2025 28 23 - 31 mmol/L Final     Blood Urea Nitrogen   Date Value Ref Range Status   04/10/2025 17.1 9.8 - 20.1 mg/dL Final     Creatinine   Date Value Ref Range Status   04/10/2025 0.81 0.55 - 1.02 mg/dL Final     Calcium   Date Value Ref Range Status   04/10/2025 9.6 8.4 - 10.2 mg/dL Final     Albumin   Date Value Ref Range Status   04/10/2025 3.8 3.4 - 4.8 g/dL Final     Bilirubin Total   Date Value Ref Range Status   04/10/2025 0.5 <=1.5 mg/dL Final     ALP   Date Value Ref Range Status   04/10/2025 130 40 - 150 unit/L Final     AST   Date Value Ref Range Status   04/10/2025 21 11 - 45 unit/L Final     ALT   Date Value Ref Range Status   04/10/2025 23 0 - 55 unit/L Final     Lab Results   Component Value Date    CHOL 224 (H) 04/10/2025     Lab Results   Component Value Date    HDL 64 (H) 04/10/2025     Lab Results   Component Value Date    TRIG 111 04/10/2025     Lab Results   Component Value Date    VLDL 22 04/10/2025     Lab Results   Component Value Date    .00 04/10/2025     Lab Results   Component  Value Date    TSH 5.608 (H) 04/10/2025         Assessment        ICD-10-CM ICD-9-CM   1. Mixed hyperlipidemia  E78.2 272.2   2. Subclinical hypothyroidism  E03.8 244.8   3. Psoriatic arthritis  L40.50 696.0        Plan      Problem List Items Addressed This Visit       Psoriatic arthritis (Chronic)    Current Assessment & Plan   Pt reports manageable  She treats with ibuprofen as needed         Mixed hyperlipidemia - Primary (Chronic)    Current Assessment & Plan   Borderline- continue omega supplement  Repeat as ordered  Stressed importance of dietary modifications. Follow a low cholesterol, low saturated fat diet with less that 200mg of cholesterol a day.  Avoid fried foods and high saturated fats (high saturated fats less than 7% of calories).  Add Flax Seed/Fish Oil supplements to diet. Increase dietary fiber.  Regular exercise can reduce LDL and raise HDL. Stressed importance of physical activity 5 times per week for 30 minutes per day.            Relevant Orders    CBC Auto Differential    Comprehensive Metabolic Panel    Lipid Panel    Subclinical hypothyroidism    Current Assessment & Plan   Asymptomatic- reports she has had this in the past   Will monitor         Relevant Orders    CBC Auto Differential    TSH    T4, Free    Urinalysis, Reflex to Urine Culture       Future Appointments   Date Time Provider Department Center   4/17/2025  7:00 AM NURSE, [place] [facility] [place] [facility] [place] [facility]   4/17/2025  8:00 AM INFUSION ROOM 532, [facility] CHEMOTHERAPY INFUSION [facility] [place] [facility]   4/17/2025  9:00 AM DIETITIAN, [place] [facility]EMATOLOGY [facility]COL[facility]Y [place] [facility]   4/17/2025  9:30 AM NURSE NAVIGATOR, [place] [facility] [place] [facility]   4/24/2025 10:00 AM NURSE, [place] [facility] [place] [facility]   4/24/2025 11:00 AM Arlene Noonan FNP [place] [facility] [facility]   5/30/2025  8:10 AM PROVIDERS, [facility] [place] [facility]   7/7/2025  2:00 PM Henny Sal,  CNP [facility] [place] [facility]   4/14/2026  1:20 PM Sweta Lanza, FNP [facility] [place] [facility]        Follow up in about 6 months (around 10/14/2025) for HLD, Thyroid, With labwork prior to visit.      Signature:      [facility]  [address] [facility] [address]    Date of encounter: 4/14/25

## 2025-04-14 NOTE — ASSESSMENT & PLAN NOTE
Borderline- continue omega supplement  Repeat as ordered  Stressed importance of dietary modifications. Follow a low cholesterol, low saturated fat diet with less that 200mg of cholesterol a day.  Avoid fried foods and high saturated fats (high saturated fats less than 7% of calories).  Add Flax Seed/Fish Oil supplements to diet. Increase dietary fiber.  Regular exercise can reduce LDL and raise HDL. Stressed importance of physical activity 5 times per week for 30 minutes per day.

## 2025-04-15 DIAGNOSIS — C50.912 INVASIVE DUCTAL CARCINOMA OF LEFT BREAST: Primary | ICD-10-CM

## 2025-04-15 DIAGNOSIS — C50.912 INVASIVE DUCTAL CARCINOMA OF BREAST, FEMALE, LEFT: ICD-10-CM

## 2025-04-16 RX ORDER — HEPARIN 100 UNIT/ML
500 SYRINGE INTRAVENOUS
Status: CANCELLED | OUTPATIENT
Start: 2025-04-17

## 2025-04-16 RX ORDER — DEXAMETHASONE SODIUM PHOSPHATE 10 MG/ML
10 INJECTION INTRAMUSCULAR; INTRAVENOUS
OUTPATIENT
Start: 2025-04-24

## 2025-04-16 RX ORDER — DIPHENHYDRAMINE HYDROCHLORIDE 50 MG/ML
50 INJECTION, SOLUTION INTRAMUSCULAR; INTRAVENOUS ONCE AS NEEDED
OUTPATIENT
Start: 2025-04-24

## 2025-04-16 RX ORDER — HEPARIN 100 UNIT/ML
500 SYRINGE INTRAVENOUS
OUTPATIENT
Start: 2025-04-24

## 2025-04-16 RX ORDER — DIPHENHYDRAMINE HYDROCHLORIDE 50 MG/ML
50 INJECTION, SOLUTION INTRAMUSCULAR; INTRAVENOUS ONCE AS NEEDED
Status: CANCELLED | OUTPATIENT
Start: 2025-04-17

## 2025-04-16 RX ORDER — FAMOTIDINE 10 MG/ML
20 INJECTION, SOLUTION INTRAVENOUS
OUTPATIENT
Start: 2025-04-24

## 2025-04-16 RX ORDER — EPINEPHRINE 0.3 MG/.3ML
0.3 INJECTION SUBCUTANEOUS ONCE AS NEEDED
OUTPATIENT
Start: 2025-04-24

## 2025-04-16 RX ORDER — FAMOTIDINE 10 MG/ML
20 INJECTION, SOLUTION INTRAVENOUS
OUTPATIENT
Start: 2025-05-01

## 2025-04-16 RX ORDER — HEPARIN 100 UNIT/ML
500 SYRINGE INTRAVENOUS
OUTPATIENT
Start: 2025-05-01

## 2025-04-16 RX ORDER — SODIUM CHLORIDE 0.9 % (FLUSH) 0.9 %
10 SYRINGE (ML) INJECTION
OUTPATIENT
Start: 2025-04-24

## 2025-04-16 RX ORDER — EPINEPHRINE 0.3 MG/.3ML
0.3 INJECTION SUBCUTANEOUS ONCE AS NEEDED
OUTPATIENT
Start: 2025-05-01

## 2025-04-16 RX ORDER — DIPHENHYDRAMINE HYDROCHLORIDE 50 MG/ML
50 INJECTION, SOLUTION INTRAMUSCULAR; INTRAVENOUS ONCE AS NEEDED
OUTPATIENT
Start: 2025-05-01

## 2025-04-16 RX ORDER — FAMOTIDINE 10 MG/ML
20 INJECTION, SOLUTION INTRAVENOUS
Status: CANCELLED | OUTPATIENT
Start: 2025-04-17

## 2025-04-16 RX ORDER — SODIUM CHLORIDE 0.9 % (FLUSH) 0.9 %
10 SYRINGE (ML) INJECTION
Status: CANCELLED | OUTPATIENT
Start: 2025-04-17

## 2025-04-16 RX ORDER — DEXAMETHASONE SODIUM PHOSPHATE 10 MG/ML
10 INJECTION INTRAMUSCULAR; INTRAVENOUS
OUTPATIENT
Start: 2025-05-01

## 2025-04-16 RX ORDER — SODIUM CHLORIDE 0.9 % (FLUSH) 0.9 %
10 SYRINGE (ML) INJECTION
OUTPATIENT
Start: 2025-05-01

## 2025-04-16 RX ORDER — EPINEPHRINE 0.3 MG/.3ML
0.3 INJECTION SUBCUTANEOUS ONCE AS NEEDED
Status: CANCELLED | OUTPATIENT
Start: 2025-04-17

## 2025-04-16 NOTE — PROGRESS NOTES
Initial Nutrition Assessment    Alma Rosa Disla is a 64 y.o. female.    DATE: 04/17/2025     Referral from: Oncology    Diagnosis: Left Breast Cancer    PAST MEDICAL HISTORY  Past Medical History:   Diagnosis Date    Breast cancer     left    Hyperlipidemia     Menopause 4/27/20020     Past Surgical History:   Procedure Laterality Date    ADENOIDECTOMY      CHOLECYSTECTOMY  1998    COLONOSCOPY      with biopsy    INSERTION OF TUNNELED CENTRAL VENOUS CATHETER (CVC) WITH SUBCUTANEOUS PORT N/A 3/24/2025    Procedure: [id number]-[id number];  Surgeon: Vik Lynch Jr., MD;  Location: [facility];  Service: General;  Laterality: N/A;  Likely right    OCCLUSION, FALLOPIAN TUBE, USING DEVICE, BY VAGINAL OR SUPRAPUBIC APPROACH Bilateral     TONSILLECTOMY  1975     Family History   Problem Relation Name Age of Onset    Heart disease Mother Zari Vinay     Diabetes Mother Zari Vinay     Alzheimer's disease Mother Zari Vinay     Stroke Mother Zari Vinay     Arthritis Mother Zari Vinay     Cancer Father HarrisEarl         Lung cancer    Heart failure Father HarrisEarl     Cancer Sister Rakesh         ovarian    Diabetes Sister Rakesh     Rashes / Skin problems Sister Rakesh     Diabetes Brother Rakesh SLETTY     Epilepsy Brother Rakesh SLETTY     Heart failure Maternal Grandmother Mary     Diabetes Maternal Grandmother Mary     Osteoarthritis Maternal Aunt Valentina     Cancer Sister Jeannine  Jolley         Ovarian  cancer    Diabetes Sister Jeannine  Jolley     Ovarian cancer Sister Jeannine  Jolley     Diabetes Brother DOREEN FRAUSTO     Rashes / Skin problems Brother DOREEN RFAUSTO     Seizures Brother DOREEN FRAUSTO     Stroke Brother DOREEN FRAUSTO     Arthritis Brother Brando Frausto     Diabetes Brother DOREEN FRAUSTO     Rashes / Skin problems Brother DOREEN FRAUSTO     Seizures Brother DOREEN FRAUSTO     Stroke Brother DOREEN FRAUSTO   "   Arthritis Brother Brando Mclean      Social History     Tobacco Use    Smoking status: Never     Passive exposure: Never    Smokeless tobacco: Never   Substance and Sexual Activity    Alcohol use: Not Currently     Comment: I ONLY WINE MAYBE TWICE year    Drug use: Never    Sexual activity: Not Currently     Partners: Male       TREATMENT PLAN:  Chemo: [x] Yes, OP BREAST PEMBROLIZUMAB CARBOPLATIN (AUC 5) WITH WEEKLY PACLITAXEL FOLLOWED BY PEMBROLIZUMAB DOXORUBICIN CYCLOPHOSPHAMIDE FOLLOWED BY PEMBROLIZUMAB 200 MG Q3W       [] No  Radiation: [] Yes      [x] No    Review of patient's allergies indicates:  No Known Allergies    ANTHROPOMETRICS:  Height:  63"  Weight:   Wt Readings from Last 10 Encounters:   04/17/25 103.1 kg (227 lb 4.7 oz)   04/14/25 103.2 kg (227 lb 8 oz)   04/10/25 100.2 kg (221 lb)   04/03/25 102.5 kg (226 lb)   03/24/25 99.4 kg (219 lb 3.2 oz)   02/27/25 100.7 kg (222 lb)   02/24/25 99.8 kg (220 lb)   02/24/25 99.8 kg (220 lb)   02/13/25 100.9 kg (222 lb 6.4 oz)   12/12/24 95.2 kg (209 lb 14.4 oz)     Weight Changes: has increased ~15 pounds over last 3-4 months  BMI: 40     INTAKE:  Current Diet: regular diet  Diet Texture: Regular  % PO consumed at meals: %  Dietary Patterns: Patient eats []0  []1  []2  [x]3  [] 4 meals/day  Skips [x]0  []1  []2  []3 meals  Cooks [] some meals  [x] most meals  Eats out [] Frequently  [x] occasionally  Drinks Mostly water, tea, and smoothies    Current ONS Intake: []  Yes    [x] No    Enteral Nutrition     Patient not receiving enteral nutrition at this time.    Food Security  Is patient able to sufficiently able to prepare meals at home? [x] Yes  [] No []N/A  If no, does patient have help cooking, preparing, and serving meals at home? [] Yes  [] No [x] N/A    SYMPTOMS/COMPLAINTS:  none identified    Current Medications: Current Medications[1]       Current labs reviewed:  4/17/25 -- H/H 13/41, Glu 91, K 4, BUN 17, Cr 0.72, Alb 3.6    RD Note:  4/17/25 -- " Pt beginning chemo treatment for breast cancer accompanied by ; pt reports good appetite without difficulty eating; Eats 3 meals with occasional snack daily including smoothies; Takes daily MVI; denies n/v/d; Pt reports UBW as 210 lb reporting weight gain d/t increased eating in preparation for chemo treatment; Pt continues to shop & cook for self but does have help from  & son who lives in the home with her; Discussed ONS available through [facility] if needed, pt reports prefers making her own smoothies    Education Provided:  Nutrition during cancer treatment, Food Safety  Teaching Method: explanation and printed materials  Comprehension: verbalizes understanding  Barriers to Learning: none evident  Expected Compliance: good  Comments: All questions were answered and dietitian's contact information was provided.     Estimated Nutrition Needs:   Calories :2060 - 2260 kcal (20 - 22 kcal/kg/CBW)  Protein : 68 - 73 gm (1.3-1.4 gm/kg/IBW)  Fluid: 7632-2422 ml (1ml/kcal)    Nutrition Problem (PES):   PES: Increased nutrient needs (protein ) related to catabolic illness as evidenced by breast cancer receiving chemo treatment. (new)    RD Plan/Goals:   [x] Weight stable [] Weight gain  [] Weight Loss [] Increase Kcal/protein [] Biochemical data improved  [] Adjust Tube-feeding Rx  [] Tolerate Tube Feedings   [] Increase tube feedings to goal   [] Tolerate Supplements   [] Symptoms Improved  [] Understand nutrition Education  [] offer supportive visits [x] other, please specify maintain adequate po intake to meet nutrition needs        Interventions:  General Healthy diet  Monitor Weight Weekly     Follow up:   []  Will continue to closely monitor throughout chemotherapy treatment regimen.  []  Will continue to monitor with MD follow up appointments or as needed per patient  [x]  Consult MARLY Del Castillo RDN, LDN        [1]   Current Outpatient Medications:     dexAMETHasone (DECADRON) 4 MG Tab, Take 2  tablets (8 mg total) by mouth once daily. Take 2 tablets (8 mg) by mouth once daily on days 2,3,4 following chemotherapy. (Patient not taking: Reported on 4/14/2025), Disp: 24 tablet, Rfl: 2    omega-3 acid ethyl esters (LOVAZA) 1 gram capsule, Take 2 g by mouth 2 (two) times daily. (Patient not taking: Reported on 4/14/2025), Disp: , Rfl:   No current facility-administered medications for this visit.    Facility-Administered Medications Ordered in Other Visits:     0.9% NaCl 100 mL flush bag, , Intravenous, PRN, Maximo Fox MD, Last Rate: 25 mL/hr at 04/17/25 0832, New Bag at 04/17/25 0832    alteplase injection 2 mg, 2 mg, Intra-Catheter, PRN, Maximo Fox MD    aprepitant (Cinvanti) injection 130 mg, 130 mg, Intravenous, 1 time in Clinic/MAJOR, Maximo Fox MD    CARBOplatin (PARAPLATIN) 750 mg in 0.9% NaCl 285 mL chemo infusion, 750 mg, Intravenous, 1 time in Clinic/MAJOR, Maximo Fox MD    diphenhydrAMINE injection 50 mg, 50 mg, Intravenous, 1 time in [facility]/MAJOR, Maximo Fox MD    diphenhydrAMINE injection 50 mg, 50 mg, Intravenous, Once PRN, Maximo Fox MD    EPINEPHrine injection 0.3 mg, 0.3 mg, Intramuscular, Once PRN, Maximo Fox MD    famotidine (PF) injection 20 mg, 20 mg, Intravenous, 1 time in Clinic/MAJOR, Maximo Fox MD    heparin, porcine (PF) 100 unit/mL injection flush 500 Units, 500 Units, Intravenous, PRN, Maximo Fox MD    hydrocortisone sodium succinate injection 100 mg, 100 mg, Intravenous, Once PRN, Maximo Fox MD    PACLitaxeL (TAXOL) 80 mg/m2 = 168 mg in 0.9% NaCl 250 mL chemo infusion, 80 mg/m2 (Treatment Plan Recorded), Intravenous, 1 time in Clinic/MAJOR, Maximo Fox MD    palonosetron 0.25mg/dexAMETHasone 12mg in NS IVPB 0.25 mg 50 mL, 0.25 mg, Intravenous, 1 time in Clinic/HOD, Maximo Fox MD    pembrolizumab (KEYTRUDA) 200 mg in 0.9% NaCl SolP 123 mL infusion, 200 mg, Intravenous, 1 time in Clinic/HOD, Maximo Fox MD,  Last Rate: 246 mL/hr at 04/17/25 0858, 200 mg at 04/17/25 0858    sodium chloride 0.9% flush 10 mL, 10 mL, Intravenous, PRN, Maximo Fox MD

## 2025-04-17 ENCOUNTER — INFUSION (OUTPATIENT)
Dept: INFUSION THERAPY | Facility: HOSPITAL | Age: 65
End: 2025-04-17
Attending: INTERNAL MEDICINE
Payer: MEDICARE

## 2025-04-17 ENCOUNTER — LAB VISIT (OUTPATIENT)
Dept: HEMATOLOGY/ONCOLOGY | Facility: CLINIC | Age: 65
End: 2025-04-17
Payer: MEDICARE

## 2025-04-17 ENCOUNTER — DOCUMENTATION ONLY (OUTPATIENT)
Dept: HEMATOLOGY/ONCOLOGY | Facility: CLINIC | Age: 65
End: 2025-04-17

## 2025-04-17 ENCOUNTER — CLINICAL SUPPORT (OUTPATIENT)
Dept: HEMATOLOGY/ONCOLOGY | Facility: CLINIC | Age: 65
End: 2025-04-17
Payer: MEDICARE

## 2025-04-17 VITALS
HEIGHT: 62 IN | WEIGHT: 227.31 LBS | TEMPERATURE: 98 F | BODY MASS INDEX: 41.83 KG/M2 | OXYGEN SATURATION: 97 % | DIASTOLIC BLOOD PRESSURE: 72 MMHG | RESPIRATION RATE: 20 BRPM | SYSTOLIC BLOOD PRESSURE: 147 MMHG | HEART RATE: 68 BPM

## 2025-04-17 DIAGNOSIS — C50.912 INVASIVE DUCTAL CARCINOMA OF BREAST, FEMALE, LEFT: Primary | ICD-10-CM

## 2025-04-17 DIAGNOSIS — C50.912 INVASIVE DUCTAL CARCINOMA OF BREAST, FEMALE, LEFT: ICD-10-CM

## 2025-04-17 DIAGNOSIS — C50.912 INVASIVE DUCTAL CARCINOMA OF LEFT BREAST: Primary | ICD-10-CM

## 2025-04-17 DIAGNOSIS — C50.912 INVASIVE DUCTAL CARCINOMA OF LEFT BREAST: ICD-10-CM

## 2025-04-17 LAB
ALBUMIN SERPL-MCNC: 3.6 G/DL (ref 3.4–4.8)
ALBUMIN/GLOB SERPL: 1 RATIO (ref 1.1–2)
ALP SERPL-CCNC: 122 UNIT/L (ref 40–150)
ALT SERPL-CCNC: 26 UNIT/L (ref 0–55)
ANION GAP SERPL CALC-SCNC: 6 MEQ/L
AST SERPL-CCNC: 24 UNIT/L (ref 11–45)
BASOPHILS # BLD AUTO: 0.05 X10(3)/MCL
BASOPHILS NFR BLD AUTO: 1 %
BILIRUB SERPL-MCNC: 0.4 MG/DL
BUN SERPL-MCNC: 17.3 MG/DL (ref 9.8–20.1)
CALCIUM SERPL-MCNC: 9.1 MG/DL (ref 8.4–10.2)
CHLORIDE SERPL-SCNC: 106 MMOL/L (ref 98–107)
CO2 SERPL-SCNC: 27 MMOL/L (ref 23–31)
CREAT SERPL-MCNC: 0.72 MG/DL (ref 0.55–1.02)
CREAT/UREA NIT SERPL: 24
EOSINOPHIL # BLD AUTO: 0.13 X10(3)/MCL (ref 0–0.9)
EOSINOPHIL NFR BLD AUTO: 2.5 %
ERYTHROCYTE [DISTWIDTH] IN BLOOD BY AUTOMATED COUNT: 13 % (ref 11.5–17)
GFR SERPLBLD CREATININE-BSD FMLA CKD-EPI: >60 ML/MIN/1.73/M2
GLOBULIN SER-MCNC: 3.5 GM/DL (ref 2.4–3.5)
GLUCOSE SERPL-MCNC: 91 MG/DL (ref 82–115)
HCT VFR BLD AUTO: 41.2 % (ref 37–47)
HGB BLD-MCNC: 13.8 G/DL (ref 12–16)
IMM GRANULOCYTES # BLD AUTO: 0.01 X10(3)/MCL (ref 0–0.04)
IMM GRANULOCYTES NFR BLD AUTO: 0.2 %
LYMPHOCYTES # BLD AUTO: 1.59 X10(3)/MCL (ref 0.6–4.6)
LYMPHOCYTES NFR BLD AUTO: 31.2 %
MCH RBC QN AUTO: 31.3 PG (ref 27–31)
MCHC RBC AUTO-ENTMCNC: 33.5 G/DL (ref 33–36)
MCV RBC AUTO: 93.4 FL (ref 80–94)
MONOCYTES # BLD AUTO: 0.38 X10(3)/MCL (ref 0.1–1.3)
MONOCYTES NFR BLD AUTO: 7.5 %
NEUTROPHILS # BLD AUTO: 2.94 X10(3)/MCL (ref 2.1–9.2)
NEUTROPHILS NFR BLD AUTO: 57.6 %
NRBC BLD AUTO-RTO: 0 %
PLATELET # BLD AUTO: 214 X10(3)/MCL (ref 130–400)
PMV BLD AUTO: 11 FL (ref 7.4–10.4)
POTASSIUM SERPL-SCNC: 4 MMOL/L (ref 3.5–5.1)
PROT SERPL-MCNC: 7.1 GM/DL (ref 5.8–7.6)
RBC # BLD AUTO: 4.41 X10(6)/MCL (ref 4.2–5.4)
SODIUM SERPL-SCNC: 139 MMOL/L (ref 136–145)
WBC # BLD AUTO: 5.1 X10(3)/MCL (ref 4.5–11.5)

## 2025-04-17 PROCEDURE — 85025 COMPLETE CBC W/AUTO DIFF WBC: CPT

## 2025-04-17 PROCEDURE — 36415 COLL VENOUS BLD VENIPUNCTURE: CPT

## 2025-04-17 PROCEDURE — 96413 CHEMO IV INFUSION 1 HR: CPT

## 2025-04-17 PROCEDURE — 96367 TX/PROPH/DG ADDL SEQ IV INF: CPT

## 2025-04-17 PROCEDURE — 99211 OFF/OP EST MAY X REQ PHY/QHP: CPT | Mod: PBBFAC

## 2025-04-17 PROCEDURE — 96375 TX/PRO/DX INJ NEW DRUG ADDON: CPT

## 2025-04-17 PROCEDURE — 96417 CHEMO IV INFUS EACH ADDL SEQ: CPT

## 2025-04-17 PROCEDURE — 25000003 PHARM REV CODE 250: Performed by: INTERNAL MEDICINE

## 2025-04-17 PROCEDURE — A4216 STERILE WATER/SALINE, 10 ML: HCPCS | Performed by: INTERNAL MEDICINE

## 2025-04-17 PROCEDURE — 80053 COMPREHEN METABOLIC PANEL: CPT

## 2025-04-17 PROCEDURE — 63600175 PHARM REV CODE 636 W HCPCS: Mod: JZ,TB | Performed by: INTERNAL MEDICINE

## 2025-04-17 RX ORDER — SODIUM CHLORIDE 0.9 % (FLUSH) 0.9 %
10 SYRINGE (ML) INJECTION
Status: DISCONTINUED | OUTPATIENT
Start: 2025-04-17 | End: 2025-04-17 | Stop reason: HOSPADM

## 2025-04-17 RX ORDER — DIPHENHYDRAMINE HYDROCHLORIDE 50 MG/ML
50 INJECTION, SOLUTION INTRAMUSCULAR; INTRAVENOUS
Status: COMPLETED | OUTPATIENT
Start: 2025-04-17 | End: 2025-04-17

## 2025-04-17 RX ORDER — HEPARIN 100 UNIT/ML
500 SYRINGE INTRAVENOUS
Status: DISCONTINUED | OUTPATIENT
Start: 2025-04-17 | End: 2025-04-17 | Stop reason: HOSPADM

## 2025-04-17 RX ORDER — FAMOTIDINE 10 MG/ML
20 INJECTION, SOLUTION INTRAVENOUS
Status: COMPLETED | OUTPATIENT
Start: 2025-04-17 | End: 2025-04-17

## 2025-04-17 RX ORDER — EPINEPHRINE 1 MG/ML
0.3 INJECTION INTRAMUSCULAR; INTRAVENOUS; SUBCUTANEOUS ONCE AS NEEDED
Status: DISCONTINUED | OUTPATIENT
Start: 2025-04-17 | End: 2025-04-17 | Stop reason: HOSPADM

## 2025-04-17 RX ORDER — DIPHENHYDRAMINE HYDROCHLORIDE 50 MG/ML
50 INJECTION, SOLUTION INTRAMUSCULAR; INTRAVENOUS ONCE AS NEEDED
Status: DISCONTINUED | OUTPATIENT
Start: 2025-04-17 | End: 2025-04-17 | Stop reason: HOSPADM

## 2025-04-17 RX ADMIN — SODIUM CHLORIDE 200 MG: 9 INJECTION, SOLUTION INTRAVENOUS at 08:04

## 2025-04-17 RX ADMIN — SODIUM CHLORIDE: 9 INJECTION, SOLUTION INTRAVENOUS at 08:04

## 2025-04-17 RX ADMIN — DIPHENHYDRAMINE HYDROCHLORIDE 50 MG: 50 INJECTION INTRAMUSCULAR; INTRAVENOUS at 09:04

## 2025-04-17 RX ADMIN — PACLITAXEL 168 MG: 6 INJECTION, SOLUTION INTRAVENOUS at 10:04

## 2025-04-17 RX ADMIN — APREPITANT 130 MG: 130 INJECTION, EMULSION INTRAVENOUS at 09:04

## 2025-04-17 RX ADMIN — DEXAMETHASONE SODIUM PHOSPHATE 0.25 MG: 4 INJECTION, SOLUTION INTRA-ARTICULAR; INTRALESIONAL; INTRAMUSCULAR; INTRAVENOUS; SOFT TISSUE at 09:04

## 2025-04-17 RX ADMIN — Medication 10 ML: at 12:04

## 2025-04-17 RX ADMIN — CARBOPLATIN 750 MG: 10 INJECTION, SOLUTION INTRAVENOUS at 12:04

## 2025-04-17 RX ADMIN — FAMOTIDINE 20 MG: 10 INJECTION, SOLUTION INTRAVENOUS at 09:04

## 2025-04-17 RX ADMIN — HEPARIN 500 UNITS: 100 SYRINGE at 12:04

## 2025-04-17 NOTE — NURSING
0839  Pt did labs and is here for C 1 keytruda, taxol, carbo.  Pt accomp by her .  Pt denies any pain.  States she is nauseated from her nerves of starting chemo.  Informed pt she would be speaking with the nutritionist and nurse navigator today.

## 2025-04-17 NOTE — NURSING
MARQUITA Gallo met with patient in Infusion Clinic for Patient-centered Resource Education. Reviewed RN Sabino contact information and role in patient's care. Patient reported she was nervous this morning but avfter the medication started she was fine. Denies any resource needs at this time. Social support reported as good. Provided information on counseling services. Resource folder with written information of community and cancer resources, disability benefits, and nutritional hints during cancer treatment given to patient. Spent additional time on signs of infection, infection prevention through good hand hygiene and wearing mask, adequate nutrition/hydration, skin care along with sunscreen, and oral care. Provided QR codes for 3 step Infection Prevention videos. Discussed communication process for some common scenarios in which patient should call provider for guidance vs. Urgent care or immediately report to the emergency room. Informed of [facility] and [employer] referral that she may need to utilize during the course of her treatment. Patient verbalized understanding and agreed to be referred. Patient agrees to contact MARQUITA Gallo if any needs or concerns arise.     Oncology Navigation   Intake  Cancer Type: Breast  Appointment Date: 02/24/25  Start of Treatment: 04/17/25     Treatment  Current Status: Active    Surgical Oncologist: Dr. Noguera    Medical Oncologist: Maximo Fox MD  Consult Date: 02/24/25  Chemotherapy: Initiated  Chemotherapy Regimen: Preoperative pembrolizumab/carboplatin/Taxol, followed by   preoperative pembrolizumab/Cyclophosphamide/doxorubicin or epirubicin, followed by   adjuvant pembrolizumab       Procedures: CT; Bone scan; MRI; Echo; Port / PICC  Bone Scan Schedule Date: 03/06/25  CT Schedule Date: 03/06/25  Echo Schedule Date: 03/05/25  Port / PICC Schedule Date: 02/27/25    General Referrals: [employer]    ER: Negative  NV: Negative  Her2:  "Negative       Support Systems: Spouse/significant other; Family members; [place]; Friends / neighbors  Barriers of Care: Barriers to Care "Assessment completed-no barriers noted"     Acuity  Stage: 1  Systemic Treatment - predicted or initiated: Chemotherapy Regimen with Multiple drugs (+1)  Treatment Tolerability: Has not started treatment yet/treatment fully completed and side effects resolved  ECO  Comorbidities in Medical History: 1  Hospitalization Within the Past Month: 0   Needed: 0  Support: 0  Verbalizes Financial Concerns: 0  Transportation: 0  Mental Health: PHQ Score: 0  History of noncompliance/frequent no shows and cancellations: 0  Verbalizes the need for more education: 0  Navigation Acuity: 2     Follow Up  No follow-ups on file.       "

## 2025-04-23 ENCOUNTER — TELEPHONE (OUTPATIENT)
Dept: HEMATOLOGY/ONCOLOGY | Facility: CLINIC | Age: 65
End: 2025-04-23
Payer: MEDICARE

## 2025-04-23 DIAGNOSIS — C50.912 INVASIVE DUCTAL CARCINOMA OF BREAST, FEMALE, LEFT: Primary | ICD-10-CM

## 2025-04-24 ENCOUNTER — INFUSION (OUTPATIENT)
Dept: INFUSION THERAPY | Facility: HOSPITAL | Age: 65
End: 2025-04-24
Attending: INTERNAL MEDICINE
Payer: MEDICARE

## 2025-04-24 ENCOUNTER — OFFICE VISIT (OUTPATIENT)
Dept: HEMATOLOGY/ONCOLOGY | Facility: CLINIC | Age: 65
End: 2025-04-24
Payer: MEDICARE

## 2025-04-24 VITALS
HEART RATE: 73 BPM | RESPIRATION RATE: 20 BRPM | SYSTOLIC BLOOD PRESSURE: 126 MMHG | DIASTOLIC BLOOD PRESSURE: 60 MMHG | OXYGEN SATURATION: 97 % | TEMPERATURE: 99 F

## 2025-04-24 VITALS
SYSTOLIC BLOOD PRESSURE: 118 MMHG | DIASTOLIC BLOOD PRESSURE: 76 MMHG | HEIGHT: 62 IN | RESPIRATION RATE: 18 BRPM | OXYGEN SATURATION: 99 % | HEART RATE: 88 BPM | TEMPERATURE: 98 F | BODY MASS INDEX: 40.27 KG/M2 | WEIGHT: 218.81 LBS

## 2025-04-24 DIAGNOSIS — D84.821 IMMUNODEFICIENCY DUE TO CHEMOTHERAPY: Primary | ICD-10-CM

## 2025-04-24 DIAGNOSIS — T45.1X5A IMMUNODEFICIENCY DUE TO CHEMOTHERAPY: Primary | ICD-10-CM

## 2025-04-24 DIAGNOSIS — C50.912 INVASIVE DUCTAL CARCINOMA OF LEFT BREAST: ICD-10-CM

## 2025-04-24 DIAGNOSIS — R53.83 OTHER FATIGUE: ICD-10-CM

## 2025-04-24 DIAGNOSIS — C50.912 INVASIVE DUCTAL CARCINOMA OF LEFT BREAST: Primary | ICD-10-CM

## 2025-04-24 DIAGNOSIS — Z79.69 IMMUNODEFICIENCY DUE TO CHEMOTHERAPY: Primary | ICD-10-CM

## 2025-04-24 DIAGNOSIS — K21.9 GASTROESOPHAGEAL REFLUX DISEASE, UNSPECIFIED WHETHER ESOPHAGITIS PRESENT: ICD-10-CM

## 2025-04-24 PROCEDURE — 63600175 PHARM REV CODE 636 W HCPCS

## 2025-04-24 PROCEDURE — 63600175 PHARM REV CODE 636 W HCPCS: Performed by: INTERNAL MEDICINE

## 2025-04-24 PROCEDURE — 96413 CHEMO IV INFUSION 1 HR: CPT

## 2025-04-24 PROCEDURE — 25000003 PHARM REV CODE 250: Performed by: INTERNAL MEDICINE

## 2025-04-24 PROCEDURE — 96375 TX/PRO/DX INJ NEW DRUG ADDON: CPT

## 2025-04-24 PROCEDURE — A4216 STERILE WATER/SALINE, 10 ML: HCPCS | Performed by: INTERNAL MEDICINE

## 2025-04-24 RX ORDER — HEPARIN 100 UNIT/ML
500 SYRINGE INTRAVENOUS
Status: DISCONTINUED | OUTPATIENT
Start: 2025-04-24 | End: 2025-04-24 | Stop reason: HOSPADM

## 2025-04-24 RX ORDER — DIPHENHYDRAMINE HYDROCHLORIDE 50 MG/ML
50 INJECTION, SOLUTION INTRAMUSCULAR; INTRAVENOUS
Status: CANCELLED
Start: 2025-04-24

## 2025-04-24 RX ORDER — FAMOTIDINE 10 MG/ML
20 INJECTION, SOLUTION INTRAVENOUS
Status: COMPLETED | OUTPATIENT
Start: 2025-04-24 | End: 2025-04-24

## 2025-04-24 RX ORDER — SODIUM CHLORIDE 0.9 % (FLUSH) 0.9 %
10 SYRINGE (ML) INJECTION
Status: DISCONTINUED | OUTPATIENT
Start: 2025-04-24 | End: 2025-04-24 | Stop reason: HOSPADM

## 2025-04-24 RX ORDER — DEXAMETHASONE SODIUM PHOSPHATE 10 MG/ML
10 INJECTION INTRAMUSCULAR; INTRAVENOUS
Status: COMPLETED | OUTPATIENT
Start: 2025-04-24 | End: 2025-04-24

## 2025-04-24 RX ORDER — EPINEPHRINE 0.3 MG/.3ML
0.3 INJECTION SUBCUTANEOUS ONCE AS NEEDED
Status: DISCONTINUED | OUTPATIENT
Start: 2025-04-24 | End: 2025-04-24 | Stop reason: HOSPADM

## 2025-04-24 RX ORDER — PANTOPRAZOLE SODIUM 20 MG/1
20 TABLET, DELAYED RELEASE ORAL DAILY
Qty: 30 TABLET | Refills: 1 | Status: SHIPPED | OUTPATIENT
Start: 2025-04-24 | End: 2026-04-24

## 2025-04-24 RX ORDER — DIPHENHYDRAMINE HYDROCHLORIDE 50 MG/ML
50 INJECTION, SOLUTION INTRAMUSCULAR; INTRAVENOUS
Status: COMPLETED | OUTPATIENT
Start: 2025-04-24 | End: 2025-04-24

## 2025-04-24 RX ORDER — DIPHENHYDRAMINE HYDROCHLORIDE 50 MG/ML
50 INJECTION, SOLUTION INTRAMUSCULAR; INTRAVENOUS ONCE AS NEEDED
Status: DISCONTINUED | OUTPATIENT
Start: 2025-04-24 | End: 2025-04-24 | Stop reason: HOSPADM

## 2025-04-24 RX ADMIN — DEXAMETHASONE SODIUM PHOSPHATE 10 MG: 10 INJECTION INTRAMUSCULAR; INTRAVENOUS at 12:04

## 2025-04-24 RX ADMIN — Medication 10 ML: at 01:04

## 2025-04-24 RX ADMIN — PACLITAXEL 168 MG: 6 INJECTION, SOLUTION INTRAVENOUS at 12:04

## 2025-04-24 RX ADMIN — SODIUM CHLORIDE: 9 INJECTION, SOLUTION INTRAVENOUS at 11:04

## 2025-04-24 RX ADMIN — DIPHENHYDRAMINE HYDROCHLORIDE 50 MG: 50 INJECTION INTRAMUSCULAR; INTRAVENOUS at 11:04

## 2025-04-24 RX ADMIN — HEPARIN 500 UNITS: 100 SYRINGE at 01:04

## 2025-04-24 RX ADMIN — FAMOTIDINE 20 MG: 10 INJECTION, SOLUTION INTRAVENOUS at 12:04

## 2025-04-24 NOTE — NURSING
MATT Noonan NP escorted pt & spouse to Infusion Clinic, no complaints, proceeded with chemotherapy.

## 2025-04-24 NOTE — PROGRESS NOTES
[facility]  PROGRESS NOTE    Subjective:       Patient ID: Alma Rosa Disla is a 64 y.o. female.    Diagnosis:   -IDC left breast, biopsy 01/27/2025, triple negative, overall grade 3; cT2 cN0 MX, AJCC anatomic stage at least IIA, clinical prognostic stage at least IIB  -postmenopausal  -father experienced lung cancer; sister experienced ovarian cancer  -obesity    Current Treatment:  Left IJ Mediport placed 03/24/2025    Keytruda/Carboplatin/Paclitaxel every 21 days x 4 cycles   Paclitaxel Day 1, 8, 15  Started on 04/17/2025    Treatment History:    Chief Complaint: Breast Cancer (Triple Negative Breast Cancer)    HPI:  64-year-old lady, referred from [facility], with invasive ductal carcinoma.     Investigations reviewed:  -12/13/2023:  Bilateral screening mammogram (comparison:  10/23/2013 mammogram):  BI-RADS category 0-incomplete  -01/27/2025:  bilateral diagnostic mammogram, limited ultrasound left breast:  HISTORY: 64-year-old female presents for further evaluation of a mass in the upper-outer quadrant of the left breast recalled from screening at [facility] on 12/13/2023.    Overall study BI-RADS: 5-highly suggestive of malignancy (per mammogram, up to 23 mm mass; per ultrasound, 23 x 17 x 22 mm mass)  -01/27/2025:  left breast mass 2 o'clock, 7 cm from nipple, ultrasound-guided needle core biopsy:  IDC, overall grade 3  -ER negative (0%), NM negative (0%), HER2 negative (score 0), Ki-67 high proliferation (65%)     02/24/2025:    Pleasant, healthy-appearing lady who presents for initial medical oncology consultation, accompanied by .  In no acute discomfort.    Overall, feels well and healthy.  Great appetite.  Never noticed any breast lumps, breast pain, nipple discharge, skin or nipple changes.  Never noticed regional lymphadenopathy.    No anorexia, unintentional weight loss, weakness, fatigue, unusual headaches, focal neurological symptoms, chest pain, cough,  dyspnea, abdominal pain, nausea, vomiting, GI bleeding, change in bowel habits, bone pains, any urinary problems, postmenopausal spotting, etc..  ECOG 0.    Past medical history: Dyslipidemia; postmenopausal; sensorineural hearing loss; obesity; psoriatic arthritis; JENNIFER positive; bilateral tinnitus  Procedure/surgical history: Adenoidectomy; cholecystectomy; colonoscopy; tonsillectomy    -2025:  Says that she underwent colonoscopy with [facility] 3 years ago, and that it showed 1 polyp; apparently, the recommended repeat colonoscopy in 5 years  Social history:  .  Lives in [place].  Has 1 child.  A 41-year-old son  from complications of diabetes mellitus.  She cleans houses.  She smoked <1 ppd x2 years; quit 40 years ago.  No alcohol or illicit drug abuse.  Family history: Father experienced lung cancer at age 84 (was a smoker); sister experienced ovarian cancer at age 54; maternal aunt experienced unknown kind of cancer in her 60s  Health maintenance:   -2023:  Thin prep cervical Pap smear:  NILM, high-risk HPV negative  -2025:  Says that she underwent colonoscopy with [facility] 3 years ago, and that it showed 1 polyp; apparently, the recommended repeat colonoscopy in 5 years  Menstrual/Ob gyn history: Menarche at age 13; LMP at age 60; no menopausal symptoms except for irritability x3-4 years; never took hormone replacement therapy; last OCP use at age 36 (used OCPs for 3 years); for pregnancies; 2 live births; 2 miscarriages;  her 2nd child for 6 months; 1st child at age 22    Interval History:  Patient presents to clinic for toxicity check and treatment clearance for cycle 1 day 8 of Taxol.  She received 1st cycle of treatment on 2024 and tolerated it fairly well. She did report fatigue, GERD, nausea, constipation, diarrhea, abdominal cramping, and salt taste in mouth. Diarrhea resolved with imodium. Nausea resolved with  ondansetron. She denies any chest pain, shortness of breath, unusual headache, dizziness, vision changes, fever, peripheral neuropathy, diarrhea, or any new pain.  Labs and plan of care reviewed in detail with patient, verbalized understanding    PMX/PSHx  Past Medical History:   Diagnosis Date    Breast cancer     left    Hyperlipidemia     Menopause 4/27/20020      Past Surgical History:   Procedure Laterality Date    ADENOIDECTOMY      CHOLECYSTECTOMY  1998    COLONOSCOPY      with biopsy    INSERTION OF TUNNELED CENTRAL VENOUS CATHETER (CVC) WITH SUBCUTANEOUS PORT N/A 3/24/2025    Procedure: [id number];  Surgeon: Vik Lynch Jr., MD;  Location: [facility];  Service: General;  Laterality: N/A;  Likely right    OCCLUSION, FALLOPIAN TUBE, USING DEVICE, BY VAGINAL OR SUPRAPUBIC APPROACH Bilateral     TONSILLECTOMY  1975     Allergies:  Review of patient's allergies indicates:  No Known Allergies   Social History:   Social History[1]  Medications:  Current Outpatient Medications   Medication Instructions    dexAMETHasone (DECADRON) 8 mg, Oral, Daily, Take 2 tablets (8 mg) by mouth once daily on days 2,3,4 following chemotherapy.    omega-3 acid ethyl esters (LOVAZA) 2 g, 2 times daily     ROS:  Review of Systems   Constitutional:  Negative for appetite change, chills, fatigue, fever and unexpected weight change.   HENT:  Negative for facial swelling, mouth sores, nosebleeds, sinus pressure/congestion and sore throat.    Eyes:  Negative for photophobia, pain and visual disturbance.   Respiratory:  Negative for cough, chest tightness, shortness of breath and wheezing.    Cardiovascular:  Negative for chest pain, palpitations and leg swelling.   Gastrointestinal:  Negative for abdominal pain, blood in stool, change in bowel habit, constipation, diarrhea, nausea and vomiting.   Endocrine: Negative.    Genitourinary:  Negative for dysuria, frequency, hematuria, hot flashes, pelvic pain, urgency and vaginal  pain.   Musculoskeletal:  Negative for arthralgias, back pain, gait problem, joint swelling and myalgias.   Integumentary:  Negative for pallor, rash, wound, breast mass, breast discharge and breast tenderness.   Allergic/Immunologic: Negative.  Negative for immunocompromised state.   Neurological:  Negative for dizziness, vertigo, syncope, weakness, numbness and headaches.   Hematological:  Negative for adenopathy. Does not bruise/bleed easily.   Psychiatric/Behavioral:  Negative for behavioral problems and dysphoric mood. The patient is not nervous/anxious.    All other systems reviewed and are negative.  Breast: Negative for mass and tenderness      Objective:   Vitals:  Vitals:    04/24/25 1108   BP: 118/76   Pulse: 88   Resp: 18   Temp: 98.3 °F (36.8 °C)      Wt Readings from Last 3 Encounters:   04/24/25 1108 99.2 kg (218 lb 12.8 oz)   04/17/25 0824 103.1 kg (227 lb 4.7 oz)   04/14/25 1315 103.2 kg (227 lb 8 oz)      Physical Examination:  Physical Exam  Vitals and nursing note reviewed.   HENT:      Head: Normocephalic and atraumatic.      Mouth/Throat:      Mouth: Mucous membranes are moist.      Pharynx: Oropharynx is clear.   Eyes:      Extraocular Movements: Extraocular movements intact.      Conjunctiva/sclera: Conjunctivae normal.      Pupils: Pupils are equal, round, and reactive to light.   Cardiovascular:      Rate and Rhythm: Normal rate and regular rhythm.   Pulmonary:      Effort: Pulmonary effort is normal.      Breath sounds: Normal breath sounds.   Abdominal:      General: Abdomen is flat. Bowel sounds are normal.      Palpations: Abdomen is soft.   Musculoskeletal:         General: Normal range of motion.      Cervical back: Normal range of motion and neck supple.   Skin:     General: Skin is warm and dry.   Neurological:      General: No focal deficit present.      Mental Status: She is alert.   Psychiatric:         Mood and Affect: Mood normal.       ECOG SCORE           Labs:  Lab Visit on  04/24/2025   Component Date Value    Sodium 04/24/2025 136     Potassium 04/24/2025 3.9     Chloride 04/24/2025 103     CO2 04/24/2025 24     Glucose 04/24/2025 108     Blood Urea Nitrogen 04/24/2025 13.4     Creatinine 04/24/2025 0.82     Calcium 04/24/2025 8.6     Protein Total 04/24/2025 7.0     Albumin 04/24/2025 3.3 (L)     Globulin 04/24/2025 3.7 (H)     Albumin/Globulin Ratio 04/24/2025 0.9 (L)     Bilirubin Total 04/24/2025 0.5     ALP 04/24/2025 118     ALT 04/24/2025 42     AST 04/24/2025 29     eGFR 04/24/2025 >60     Anion Gap 04/24/2025 9.0     BUN/Creatinine Ratio 04/24/2025 16     Magnesium Level 04/24/2025 1.80     WBC 04/24/2025 5.84     RBC 04/24/2025 4.28     Hgb 04/24/2025 13.0     Hct 04/24/2025 38.6     MCV 04/24/2025 90.2     MCH 04/24/2025 30.4     MCHC 04/24/2025 33.7     RDW 04/24/2025 12.6     Platelet 04/24/2025 160     MPV 04/24/2025 11.9 (H)     Neut % 04/24/2025 63.5     Lymph % 04/24/2025 29.5     Mono % 04/24/2025 4.6     Eos % 04/24/2025 1.0     Basophil % 04/24/2025 0.5     Imm Grans % 04/24/2025 0.9     Neut # 04/24/2025 3.71     Lymph # 04/24/2025 1.72     Mono # 04/24/2025 0.27     Eos # 04/24/2025 0.06     Baso # 04/24/2025 0.03     Imm Gran # 04/24/2025 0.05 (H)     NRBC% 04/24/2025 0.0       Pathology:  -core biopsy 01/27/2025:  IDC, overall grade 3, triple negative  -ER negative (0%), NC negative (0%), HER2 negative (score 0), Ki-67 high proliferation (65%)  -radiologically, per mammogram and ultrasound 01/27/2025:  23 x 17 x 22 mm mass; no lymphadenopathy  -left breast tumor 23 mm per mammogram and ultrasound, 2.9 cm per MRI, triple negative; G3  -cT2 cN0 MX, AJCC anatomic stage at least IIA, clinical prognostic stage at least IIB      Radiology/Diagnostics:  -screening mammogram 12/13/2023: BI-RADS 0  -diagnostic mammogram and ultrasound 01/27/2025:  BI-RADS: 5  03/05/2025:  Pre chemotherapy TTE: LVEF 55-60%  -03/06/2025: Staging CTs C/A/P with contrast:  Lobulated left  breast nodule consistent with patient's known primary breast malignancy with no evidence of metastatic disease on this exam.  -03/06/2025:  Staging whole-body nuclear medicine bone scan: No bone metastases  -03/13/2025: Bilateral breast MRI with and without contrast: Left breast lesion #1, 2.4 x 2.2 x 2.9 cm, biopsy-proven malignancy, BI-RADS 6; no suspicious left axillary or internal mammary adenopathy; no evidence of malignancy in right breast    I have reviewed all available lab results and radiology reports.  Assessment:   IDC of left breast  Proceed with C1 D8 Taxol today  Re-stage with bilateral breast MRI with and without contrast after completion of 4 cycles of neoadjuvant chemotherapy  C1 D15 on 5/1/2025 Taxol with CBC CMP Mg  RTC on 5/8/2025 labs/NP/C2 D1 Keytruda/Taxol/Carbo labs-CBC CMP TSH Free T4  C2 D8 5/15/2025 Taxol with CBC CMP MG  C2 D15 5/22/2025 Taxol with CBC CMP MG  CBC CMP MG prior to each treatment   2. 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         Plan:    04/24/2025  Labs and exam stable  Proceed with C1 D8 Taxol today  Start Protonix 20 mg daily for GERD  Continue imodium as needed for diarrhea  Continue Ondansetron as needed for nausea  Re-stage with bilateral breast MRI with and without contrast after completion of 4 cycles of neoadjuvant chemotherapy  C1 D15 on 5/1/2025 Taxol with CBC CMP Mg  RTC on 5/8/2025 labs/NP/C2 D1 Keytruda/Taxol/Carbo labs-CBC CMP TSH Free T4  C2 D8 5/15/2025 Taxol with CBC CMP MG  C2 D15 5/22/2025 Taxol with CBC CMP MG  CBC CMP MG prior to each treatment     Providers:         Orders for 04/03/2025:   Genetic testing  Start chemotherapy ASAP  Pharmacy are in the process of making chemotherapy plan  Chemo teaching with nursing staff ASAP  Follow-up with NP within a week  Re-stage with  bilateral breast MRI with and without contrast after completion of 4 cycles of neoadjuvant chemotherapy      Above discussed with the patient.  All questions answered.    Discussed labs and scans and gave her copies of relevant results.  She will have a formal chemotherapy teaching class with our nursing staff.  She understands and agrees with this plan.  ====================================     #IDC left breast, triple negative:  -screening mammogram 12/13/2023: BI-RADS 0  -diagnostic mammogram and ultrasound 01/27/2025:  BI-RADS: 5  -core biopsy 01/27/2025:  IDC, overall grade 3, triple negative  -ER negative (0%), IL negative (0%), HER2 negative (score 0), Ki-67 high proliferation (65%)  -radiologically, per mammogram and ultrasound 01/27/2025:  23 x 17 x 22 mm mass; no lymphadenopathy  -02/24/2025: Initial consultation: ECOG 0  -pre chemotherapy TTE 03/05/2025:  LVEF 55-60%  -staging CTs C/A/P and bone scan 03/06/2025: No distant metastases  -breast MRI 03/13/2025:  Left breast lesion 2.4 x 2.2 x 2.9 cm  -left IJ MediPort placed 03/24/2025  -left breast tumor 23 mm per mammogram and ultrasound, 2.9 cm per MRI, triple negative; G3  -cT2 cN0 MX, AJCC anatomic stage at least IIA, clinical prognostic stage at least IIB  >>>  Plan:  Postmenopausal  Triple negative breast cancers; ordered genetic testing and counseling 02/24/2025  Also, family history of ovarian cancer in sister; needs genetic testing  By definition, operable breast cancer  For TNBC, cT2, preoperative systemic therapy will be preferred  In any case, long-term outcomes are the same when patients are given chemotherapy preoperatively compared with postoperatively  We will restage with breast MRI after 4 cycles of chemotherapy, then, after completion of 8 cycles of chemotherapy     Clinical prognostic stage at least IIB, therefore, preoperative followed by adjuvant:    Preoperative pembrolizumab/carboplatin/Taxol, followed by   preoperative  pembrolizumab/Cyclophosphamide/doxorubicin or epirubicin, followed by   adjuvant pembrolizumab (category 1 recommendation)  (see below)  -If residual disease after preoperative therapy with taxane/alkylator/anthracycline based chemotherapy, then, capecitabine (patients in the [facility] trial did not receive capecitabine, therefore, there are no data on sequencing or to guide selection of one agent over the other, i.e., capecitabine versus olaparib if BRCA1/2 mutation positive)  -If germline BRCA1/2 mutation positive, then, adjuvant olaparib if residual disease after neoadjuvant chemotherapy  (patients in the Giac[facility] trial did not receive capecitabine, therefore, there are no data on sequencing or to guide selection of one agent over the other)     Preoperative systemic therapy  Pembrolizumab 200 mg IV day 1  Paclitaxel 80 mg/m² IV days 1, 8, 15  Carboplatin AUC 5 IV day 1  (Cycle every 21 days x 4 cycles) (cycles 1-4)  >>>  Followed by:  -Pembrolizumab 200 mg IV day 1  -Doxorubicin 60 mg/m² IV day 1  -Cyclophosphamide 600 mg/m² IV day 1  (Cycle every 21 days for 4 cycles (cycles 5-8)  Followed by:  Adjuvant:  Pembrolizumab 200 mg IV day 1  Cycle every 21 days x 9 cycles     Response assessment to preoperative systemic therapy:  Physical examination, mammogram, and/or breast ultrasound and/or breast MRI  MRI is more accurate than mammography for assessing tumor response to preoperative therapy     After preoperative systemic therapy, if BCS and axillary staging, then:  Adjuvant systemic therapy +whole breast radiotherapy     After preoperative systemic therapy, if mastectomy and axillary staging, then:  1. Adjuvant systemic therapy +postmastectomy radiotherapy  -if any ypN+: PMRT to chest wall +comprehensive RNI with inclusion of any portion of the undetected axilla at risk  -if cN0, ypN0, then:  If axilla was assessed by SLNB or axillary dissection, then, no PMRT     Adjuvant systemic therapy after preoperative  systemic therapy:  -consider adjuvant bisphosphonate therapy for risk reduction of distant metastases for 3-5 years in postmenopausal patients with a high-risk node-negative or node positive tumors; we will consider in this patient's; she will need dental evaluation and preventive Dentistry prior, in order to prevent/minimize likelihood of osteonecrosis of the jaw  1. If ypT0N0 or PCR, then:  Adjuvant pembrolizumab (if pembrolizumab containing regimen was given preoperatively)    2. If ypT1-4, N0 or ypN=/>1, then:  -adjuvant pembrolizumab; and/or  -adjuvant capecitabine (6-8 cycles); and/or  -adjuvant olaparib x1 year if germline BRCA1/2 mutation positive (category 1 recommendation)     #Family history of lung cancer and ovarian cancer:  Father experienced lung cancer at age 84 (was a smoker); sister experienced ovarian cancer at age 54; maternal aunt experienced unknown kind of cancer in her 60s  >>>  -have ordered genetic testing and counseling     #Obesity:  -12/12/2024: BMI 37.2  -02/13/2025: BMI 39.4     History of dyslipidemia, bilateral tinnitus, sensorineural hearing loss, psoriatic arthritis              I have explained all of the above in detail and the patient understands all of the current recommendation(s). I have answered all of their questions to the best of my ability and to their complete satisfaction.       Arlene Noonan, FNP-C  Oncology/Hematology   [facility]           [1]   Social History  Tobacco Use    Smoking status: Never     Passive exposure: Never    Smokeless tobacco: Never   Substance Use Topics    Alcohol use: Not Currently     Comment: I ONLY WINE MAYBE TWICE year    Drug use: Never

## 2025-05-01 ENCOUNTER — INFUSION (OUTPATIENT)
Dept: INFUSION THERAPY | Facility: HOSPITAL | Age: 65
End: 2025-05-01
Attending: INTERNAL MEDICINE
Payer: MEDICARE

## 2025-05-01 ENCOUNTER — LAB VISIT (OUTPATIENT)
Dept: HEMATOLOGY/ONCOLOGY | Facility: CLINIC | Age: 65
End: 2025-05-01
Payer: MEDICARE

## 2025-05-01 VITALS
HEIGHT: 62 IN | RESPIRATION RATE: 20 BRPM | SYSTOLIC BLOOD PRESSURE: 132 MMHG | BODY MASS INDEX: 40.51 KG/M2 | HEART RATE: 95 BPM | TEMPERATURE: 98 F | DIASTOLIC BLOOD PRESSURE: 73 MMHG | WEIGHT: 220.13 LBS

## 2025-05-01 DIAGNOSIS — T45.1X5A IMMUNODEFICIENCY DUE TO CHEMOTHERAPY: ICD-10-CM

## 2025-05-01 DIAGNOSIS — C50.912 INVASIVE DUCTAL CARCINOMA OF LEFT BREAST: Primary | ICD-10-CM

## 2025-05-01 DIAGNOSIS — C50.912 INVASIVE DUCTAL CARCINOMA OF LEFT BREAST: ICD-10-CM

## 2025-05-01 DIAGNOSIS — D84.821 IMMUNODEFICIENCY DUE TO CHEMOTHERAPY: ICD-10-CM

## 2025-05-01 DIAGNOSIS — N39.0 URINARY TRACT INFECTION WITHOUT HEMATURIA, SITE UNSPECIFIED: Primary | ICD-10-CM

## 2025-05-01 DIAGNOSIS — Z79.69 IMMUNODEFICIENCY DUE TO CHEMOTHERAPY: ICD-10-CM

## 2025-05-01 LAB
ALBUMIN SERPL-MCNC: 2.8 G/DL (ref 3.4–4.8)
ALBUMIN/GLOB SERPL: 0.7 RATIO (ref 1.1–2)
ALP SERPL-CCNC: 124 UNIT/L (ref 40–150)
ALT SERPL-CCNC: 31 UNIT/L (ref 0–55)
ANION GAP SERPL CALC-SCNC: 9 MEQ/L
AST SERPL-CCNC: 15 UNIT/L (ref 11–45)
BACTERIA #/AREA URNS AUTO: ABNORMAL /HPF
BASOPHILS # BLD AUTO: 0.02 X10(3)/MCL
BASOPHILS NFR BLD AUTO: 0.3 %
BILIRUB SERPL-MCNC: 0.4 MG/DL
BILIRUB UR QL STRIP.AUTO: NEGATIVE
BUN SERPL-MCNC: 11.4 MG/DL (ref 9.8–20.1)
CALCIUM SERPL-MCNC: 9.3 MG/DL (ref 8.4–10.2)
CHLORIDE SERPL-SCNC: 106 MMOL/L (ref 98–107)
CLARITY UR: ABNORMAL
CO2 SERPL-SCNC: 25 MMOL/L (ref 23–31)
COLOR UR AUTO: YELLOW
CREAT SERPL-MCNC: 0.85 MG/DL (ref 0.55–1.02)
CREAT/UREA NIT SERPL: 13
EOSINOPHIL # BLD AUTO: 0.06 X10(3)/MCL (ref 0–0.9)
EOSINOPHIL NFR BLD AUTO: 0.8 %
ERYTHROCYTE [DISTWIDTH] IN BLOOD BY AUTOMATED COUNT: 12.7 % (ref 11.5–17)
GFR SERPLBLD CREATININE-BSD FMLA CKD-EPI: >60 ML/MIN/1.73/M2
GLOBULIN SER-MCNC: 4.3 GM/DL (ref 2.4–3.5)
GLUCOSE SERPL-MCNC: 108 MG/DL (ref 82–115)
GLUCOSE UR QL STRIP: NORMAL
HCT VFR BLD AUTO: 36.3 % (ref 37–47)
HGB BLD-MCNC: 12.3 G/DL (ref 12–16)
HGB UR QL STRIP: ABNORMAL
HYALINE CASTS #/AREA URNS LPF: ABNORMAL /LPF
IMM GRANULOCYTES # BLD AUTO: 0.02 X10(3)/MCL (ref 0–0.04)
IMM GRANULOCYTES NFR BLD AUTO: 0.3 %
KETONES UR QL STRIP: NEGATIVE
LEUKOCYTE ESTERASE UR QL STRIP: 500
LYMPHOCYTES # BLD AUTO: 2.01 X10(3)/MCL (ref 0.6–4.6)
LYMPHOCYTES NFR BLD AUTO: 27.4 %
MAGNESIUM SERPL-MCNC: 1.9 MG/DL (ref 1.6–2.6)
MCH RBC QN AUTO: 31.1 PG (ref 27–31)
MCHC RBC AUTO-ENTMCNC: 33.9 G/DL (ref 33–36)
MCV RBC AUTO: 91.7 FL (ref 80–94)
MONOCYTES # BLD AUTO: 0.45 X10(3)/MCL (ref 0.1–1.3)
MONOCYTES NFR BLD AUTO: 6.1 %
MUCOUS THREADS URNS QL MICRO: ABNORMAL /LPF
NEUTROPHILS # BLD AUTO: 4.78 X10(3)/MCL (ref 2.1–9.2)
NEUTROPHILS NFR BLD AUTO: 65.1 %
NITRITE UR QL STRIP: ABNORMAL
NRBC BLD AUTO-RTO: 0 %
PH UR STRIP: 5.5 [PH]
PLATELET # BLD AUTO: 179 X10(3)/MCL (ref 130–400)
PMV BLD AUTO: 10.1 FL (ref 7.4–10.4)
POTASSIUM SERPL-SCNC: 3.9 MMOL/L (ref 3.5–5.1)
PROT SERPL-MCNC: 7.1 GM/DL (ref 5.8–7.6)
PROT UR QL STRIP: ABNORMAL
RBC # BLD AUTO: 3.96 X10(6)/MCL (ref 4.2–5.4)
RBC #/AREA URNS AUTO: ABNORMAL /HPF
SODIUM SERPL-SCNC: 140 MMOL/L (ref 136–145)
SP GR UR STRIP.AUTO: 1.02 (ref 1–1.03)
SQUAMOUS #/AREA URNS LPF: ABNORMAL /HPF
UROBILINOGEN UR STRIP-ACNC: NORMAL
WBC # BLD AUTO: 7.34 X10(3)/MCL (ref 4.5–11.5)
WBC #/AREA URNS AUTO: >100 /HPF
WBC CLUMPS UR QL AUTO: ABNORMAL

## 2025-05-01 PROCEDURE — 96413 CHEMO IV INFUSION 1 HR: CPT

## 2025-05-01 PROCEDURE — 96415 CHEMO IV INFUSION ADDL HR: CPT

## 2025-05-01 PROCEDURE — 96375 TX/PRO/DX INJ NEW DRUG ADDON: CPT

## 2025-05-01 PROCEDURE — 85025 COMPLETE CBC W/AUTO DIFF WBC: CPT

## 2025-05-01 PROCEDURE — 80053 COMPREHEN METABOLIC PANEL: CPT

## 2025-05-01 PROCEDURE — 63600175 PHARM REV CODE 636 W HCPCS: Performed by: INTERNAL MEDICINE

## 2025-05-01 PROCEDURE — 81001 URINALYSIS AUTO W/SCOPE: CPT

## 2025-05-01 PROCEDURE — 83735 ASSAY OF MAGNESIUM: CPT

## 2025-05-01 PROCEDURE — 25000003 PHARM REV CODE 250: Performed by: INTERNAL MEDICINE

## 2025-05-01 RX ORDER — DIPHENHYDRAMINE HYDROCHLORIDE 50 MG/ML
50 INJECTION, SOLUTION INTRAMUSCULAR; INTRAVENOUS
Status: DISCONTINUED | OUTPATIENT
Start: 2025-05-01 | End: 2025-05-01 | Stop reason: HOSPADM

## 2025-05-01 RX ORDER — EPINEPHRINE 1 MG/ML
0.3 INJECTION INTRAMUSCULAR; INTRAVENOUS; SUBCUTANEOUS ONCE AS NEEDED
Status: DISCONTINUED | OUTPATIENT
Start: 2025-05-01 | End: 2025-05-01 | Stop reason: HOSPADM

## 2025-05-01 RX ORDER — SODIUM CHLORIDE 0.9 % (FLUSH) 0.9 %
10 SYRINGE (ML) INJECTION
Status: DISCONTINUED | OUTPATIENT
Start: 2025-05-01 | End: 2025-05-01 | Stop reason: HOSPADM

## 2025-05-01 RX ORDER — DEXAMETHASONE SODIUM PHOSPHATE 10 MG/ML
10 INJECTION INTRAMUSCULAR; INTRAVENOUS
Status: COMPLETED | OUTPATIENT
Start: 2025-05-01 | End: 2025-05-01

## 2025-05-01 RX ORDER — DIPHENHYDRAMINE HYDROCHLORIDE 50 MG/ML
50 INJECTION, SOLUTION INTRAMUSCULAR; INTRAVENOUS ONCE AS NEEDED
Status: COMPLETED | OUTPATIENT
Start: 2025-05-01 | End: 2025-05-01

## 2025-05-01 RX ORDER — NITROFURANTOIN 25; 75 MG/1; MG/1
100 CAPSULE ORAL 2 TIMES DAILY
Qty: 10 CAPSULE | Refills: 0 | Status: SHIPPED | OUTPATIENT
Start: 2025-05-01 | End: 2025-05-06

## 2025-05-01 RX ORDER — DIPHENHYDRAMINE HYDROCHLORIDE 50 MG/ML
50 INJECTION, SOLUTION INTRAMUSCULAR; INTRAVENOUS
Status: CANCELLED
Start: 2025-05-01

## 2025-05-01 RX ORDER — HEPARIN 100 UNIT/ML
500 SYRINGE INTRAVENOUS
Status: DISCONTINUED | OUTPATIENT
Start: 2025-05-01 | End: 2025-05-01 | Stop reason: HOSPADM

## 2025-05-01 RX ORDER — FAMOTIDINE 10 MG/ML
20 INJECTION, SOLUTION INTRAVENOUS
Status: COMPLETED | OUTPATIENT
Start: 2025-05-01 | End: 2025-05-01

## 2025-05-01 RX ADMIN — PACLITAXEL 168 MG: 6 INJECTION, SOLUTION INTRAVENOUS at 11:05

## 2025-05-01 RX ADMIN — DIPHENHYDRAMINE HYDROCHLORIDE 50 MG: 50 INJECTION, SOLUTION INTRAMUSCULAR; INTRAVENOUS at 11:05

## 2025-05-01 RX ADMIN — DEXAMETHASONE SODIUM PHOSPHATE 10 MG: 10 INJECTION INTRAMUSCULAR; INTRAVENOUS at 11:05

## 2025-05-01 RX ADMIN — HEPARIN 500 UNITS: 100 SYRINGE at 11:05

## 2025-05-01 RX ADMIN — SODIUM CHLORIDE: 9 INJECTION, SOLUTION INTRAVENOUS at 11:05

## 2025-05-01 RX ADMIN — FAMOTIDINE 20 MG: 10 INJECTION, SOLUTION INTRAVENOUS at 11:05

## 2025-05-01 NOTE — NURSING
"Patient here for Taxol C1D15. Secured chatted Arlene Noonan NP, "Arlene. This patient is here for Taxol D15 C1. She is complaining of symptoms of a UTI. Lower pelvic pain/pressure #8, Burning when urinates, foul smelling urine. I will get a UA and CS and resulted to you. please advise."  Urine was positive for bacteria. Arlene Noonan NP, sends a prescription for Macrobid 100 mg BID for 5 days. Patient verbalizes understanding to  prescription.  Patient tolerated Taxol.   "

## 2025-05-07 ENCOUNTER — TELEPHONE (OUTPATIENT)
Dept: HEMATOLOGY/ONCOLOGY | Facility: CLINIC | Age: 65
End: 2025-05-07
Payer: MEDICARE

## 2025-05-07 NOTE — PROGRESS NOTES
[facility] Hematology/Oncology  PROGRESS NOTE    Subjective:       Patient ID: Alma Rosa Disla is a 65 y.o. female.    Diagnosis:   -IDC left breast, biopsy 01/27/2025, triple negative, overall grade 3; cT2 cN0 MX, AJCC anatomic stage at least IIA, clinical prognostic stage at least IIB  -postmenopausal  -father experienced lung cancer; sister experienced ovarian cancer  -obesity    Current Treatment:  Left IJ Mediport placed 03/24/2025    Keytruda/Carboplatin/Paclitaxel every 21 days x 4 cycles   Paclitaxel Day 1, 8, 15  Started on 04/17/2025    Treatment History:    Chief Complaint: No chief complaint on file.    HPI:  64-year-old lady, referred from [facility] breast Clinic, with invasive ductal carcinoma.     Investigations reviewed:  -12/13/2023:  Bilateral screening mammogram (comparison:  10/23/2013 mammogram):  BI-RADS category 0-incomplete  -01/27/2025:  bilateral diagnostic mammogram, limited ultrasound left breast:  HISTORY: 64-year-old female presents for further evaluation of a mass in the upper-outer quadrant of the left breast recalled from screening at [facility] on 12/13/2023.    Overall study BI-RADS: 5-highly suggestive of malignancy (per mammogram, up to 23 mm mass; per ultrasound, 23 x 17 x 22 mm mass)  -01/27/2025:  left breast mass 2 o'clock, 7 cm from nipple, ultrasound-guided needle core biopsy:  IDC, overall grade 3  -ER negative (0%), DC negative (0%), HER2 negative (score 0), Ki-67 high proliferation (65%)     02/24/2025:    Pleasant, healthy-appearing lady who presents for initial medical oncology consultation, accompanied by .  In no acute discomfort.    Overall, feels well and healthy.  Great appetite.  Never noticed any breast lumps, breast pain, nipple discharge, skin or nipple changes.  Never noticed regional lymphadenopathy.    No anorexia, unintentional weight loss, weakness, fatigue, unusual headaches, focal neurological symptoms, chest pain, cough, dyspnea, abdominal  pain, nausea, vomiting, GI bleeding, change in bowel habits, bone pains, any urinary problems, postmenopausal spotting, etc..  ECOG 0.    Past medical history: Dyslipidemia; postmenopausal; sensorineural hearing loss; obesity; psoriatic arthritis; JENNIFER positive; bilateral tinnitus  Procedure/surgical history: Adenoidectomy; cholecystectomy; colonoscopy; tonsillectomy    -2025:  Says that she underwent colonoscopy with [facility] 3 years ago, and that it showed 1 polyp; apparently, the recommended repeat colonoscopy in 5 years  Social history:  .  Lives in [place].  Has 1 child.  A 41-year-old son  from complications of diabetes mellitus.  She cleans houses.  She smoked <1 ppd x2 years; quit 40 years ago.  No alcohol or illicit drug abuse.  Family history: Father experienced lung cancer at age 84 (was a smoker); sister experienced ovarian cancer at age 54; maternal aunt experienced unknown kind of cancer in her 60s  Health maintenance:   -2023:  Thin prep cervical Pap smear:  NILM, high-risk HPV negative  -2025:  Says that she underwent colonoscopy with [facility] 3 years ago, and that it showed 1 polyp; apparently, the recommended repeat colonoscopy in 5 years  Menstrual/Ob gyn history: Menarche at age 13; LMP at age 60; no menopausal symptoms except for irritability x3-4 years; never took hormone replacement therapy; last OCP use at age 36 (used OCPs for 3 years); for pregnancies; 2 live births; 2 miscarriages;  her 2nd child for 6 months; 1st child at age 22    Interval History:  Patient presents to clinic for toxicity check and treatment clearance for cycle 2 day 1 of Keytruda/Taxol/carbopaltin.  She received 1st cycle of treatment on 2024 and tolerated it fairly well. She does report occasional fatigue relieved with rest and metallic taste. She denies any peripheral neuropathy but does report right leg cramping. She denies any chest  pain, shortness of breath, unusual headache, dizziness, vision changes, fever, peripheral neuropathy, diarrhea, or any new pain.  Labs and plan of care reviewed in detail with patient, verbalized understanding    PMX/PSHx  Past Medical History:   Diagnosis Date    Breast cancer     left    Hyperlipidemia     Menopause 4/27/20020      Past Surgical History:   Procedure Laterality Date    ADENOIDECTOMY      CHOLECYSTECTOMY  1998    COLONOSCOPY      with biopsy    INSERTION OF TUNNELED CENTRAL VENOUS CATHETER (CVC) WITH SUBCUTANEOUS PORT N/A 3/24/2025    Procedure: [id number];  Surgeon: Vik Lynch Jr., MD;  Location: [facility];  Service: General;  Laterality: N/A;  Likely right    OCCLUSION, FALLOPIAN TUBE, USING DEVICE, BY VAGINAL OR SUPRAPUBIC APPROACH Bilateral     TONSILLECTOMY  1975     Allergies:  Review of patient's allergies indicates:  No Known Allergies   Social History:   Social History[1]  Medications:  Current Outpatient Medications   Medication Instructions    dexAMETHasone (DECADRON) 8 mg, Oral, Daily, Take 2 tablets (8 mg) by mouth once daily on days 2,3,4 following chemotherapy.    omega-3 acid ethyl esters (LOVAZA) 2 g, 2 times daily    pantoprazole (PROTONIX) 20 mg, Oral, Daily     ROS:  Review of Systems   Constitutional:  Positive for fatigue. Negative for appetite change, chills, fever and unexpected weight change.   HENT:  Negative for facial swelling, mouth sores, nosebleeds, sinus pressure/congestion and sore throat.    Eyes:  Negative for photophobia, pain and visual disturbance.   Respiratory:  Negative for cough, chest tightness, shortness of breath and wheezing.    Cardiovascular:  Negative for chest pain, palpitations and leg swelling.   Gastrointestinal:  Negative for abdominal pain, blood in stool, change in bowel habit, constipation, diarrhea, nausea and vomiting.   Endocrine: Negative.    Genitourinary:  Negative for dysuria, frequency, hematuria, hot flashes, pelvic  pain, urgency and vaginal pain.   Musculoskeletal:  Negative for arthralgias, back pain, gait problem, joint swelling and myalgias.   Integumentary:  Negative for pallor, rash, wound, breast mass, breast discharge and breast tenderness.   Allergic/Immunologic: Negative.  Negative for immunocompromised state.   Neurological:  Negative for dizziness, vertigo, syncope, weakness, numbness and headaches.   Hematological:  Negative for adenopathy. Does not bruise/bleed easily.   Psychiatric/Behavioral:  Negative for behavioral problems and dysphoric mood. The patient is not nervous/anxious.    All other systems reviewed and are negative.  Breast: Negative for mass and tenderness      Objective:   Vitals:  There were no vitals filed for this visit.     Wt Readings from Last 3 Encounters:   05/01/25 0953 99.8 kg (220 lb 1.6 oz)   04/24/25 1108 99.2 kg (218 lb 12.8 oz)   04/17/25 0824 103.1 kg (227 lb 4.7 oz)      Physical Examination:  Physical Exam  Vitals and nursing note reviewed.   HENT:      Head: Normocephalic and atraumatic.      Mouth/Throat:      Mouth: Mucous membranes are moist.      Pharynx: Oropharynx is clear.   Eyes:      Extraocular Movements: Extraocular movements intact.      Conjunctiva/sclera: Conjunctivae normal.      Pupils: Pupils are equal, round, and reactive to light.   Cardiovascular:      Rate and Rhythm: Normal rate and regular rhythm.   Pulmonary:      Effort: Pulmonary effort is normal.      Breath sounds: Normal breath sounds.   Abdominal:      General: Abdomen is flat. Bowel sounds are normal.      Palpations: Abdomen is soft.   Musculoskeletal:         General: Normal range of motion.      Cervical back: Normal range of motion and neck supple.   Skin:     General: Skin is warm and dry.   Neurological:      General: No focal deficit present.      Mental Status: She is alert.   Psychiatric:         Mood and Affect: Mood normal.       ECOG SCORE           Labs:  Lab Visit on 05/08/2025    Component Date Value    Sodium 05/08/2025 139     Potassium 05/08/2025 4.1     Chloride 05/08/2025 106     CO2 05/08/2025 26     Glucose 05/08/2025 97     Blood Urea Nitrogen 05/08/2025 12.7     Creatinine 05/08/2025 0.78     Calcium 05/08/2025 9.0     Protein Total 05/08/2025 6.9     Albumin 05/08/2025 3.0 (L)     Globulin 05/08/2025 3.9 (H)     Albumin/Globulin Ratio 05/08/2025 0.8 (L)     Bilirubin Total 05/08/2025 0.3     ALP 05/08/2025 127     ALT 05/08/2025 27     AST 05/08/2025 15     eGFR 05/08/2025 >60     Anion Gap 05/08/2025 7.0     BUN/Creatinine Ratio 05/08/2025 16     Magnesium Level 05/08/2025 1.80     TSH 05/08/2025 2.139     Thyroxine Free 05/08/2025 1.11     WBC 05/08/2025 4.21 (L)     RBC 05/08/2025 4.00 (L)     Hgb 05/08/2025 12.2     Hct 05/08/2025 36.8 (L)     MCV 05/08/2025 92.0     MCH 05/08/2025 30.5     MCHC 05/08/2025 33.2     RDW 05/08/2025 12.7     Platelet 05/08/2025 251     MPV 05/08/2025 9.9     Neut % 05/08/2025 49.3     Lymph % 05/08/2025 44.4     Mono % 05/08/2025 4.8     Eos % 05/08/2025 0.5     Basophil % 05/08/2025 0.5     Imm Grans % 05/08/2025 0.5     Neut # 05/08/2025 2.08 (L)     Lymph # 05/08/2025 1.87     Mono # 05/08/2025 0.20     Eos # 05/08/2025 0.02     Baso # 05/08/2025 0.02     Imm Gran # 05/08/2025 0.02     NRBC% 05/08/2025 0.0       Pathology:  -core biopsy 01/27/2025:  IDC, overall grade 3, triple negative  -ER negative (0%), MI negative (0%), HER2 negative (score 0), Ki-67 high proliferation (65%)  -radiologically, per mammogram and ultrasound 01/27/2025:  23 x 17 x 22 mm mass; no lymphadenopathy  -left breast tumor 23 mm per mammogram and ultrasound, 2.9 cm per MRI, triple negative; G3  -cT2 cN0 MX, AJCC anatomic stage at least IIA, clinical prognostic stage at least IIB      Radiology/Diagnostics:  -screening mammogram 12/13/2023: BI-RADS 0  -diagnostic mammogram and ultrasound 01/27/2025:  BI-RADS: 5  03/05/2025:  Pre chemotherapy TTE: LVEF  55-60%  -03/06/2025: Staging CTs C/A/P with contrast:  Lobulated left breast nodule consistent with patient's known primary breast malignancy with no evidence of metastatic disease on this exam.  -03/06/2025:  Staging whole-body nuclear medicine bone scan: No bone metastases  -03/13/2025: Bilateral breast MRI with and without contrast: Left breast lesion #1, 2.4 x 2.2 x 2.9 cm, biopsy-proven malignancy, BI-RADS 6; no suspicious left axillary or internal mammary adenopathy; no evidence of malignancy in right breast    I have reviewed all available lab results and radiology reports.  Assessment:   IDC of left breast  Proceed with C2 D1 Keytruda/Taxol/carboplatin today  Re-stage with bilateral breast MRI with and without contrast after completion of 4 cycles of neoadjuvant chemotherapy  RTC on 5/29/2025 labs/NP/C3 D1 Keytruda/Taxol/Carbo labs-CBC CMP TSH Free T4  C2 D8 5/15/2025 Taxol with CBC CMP MG  C2 D15 5/22/2025 Taxol with CBC CMP MG  CBC CMP MG prior to each treatment   2. GERD   Continue Protonix 20 mg daily for GERD  Problem List Items Addressed This Visit       Invasive ductal carcinoma of left breast - Primary    Postmenopausal    Immunodeficiency due to chemotherapy    Other fatigue           Plan:    04/24/2025  Labs and exam stable  Proceed with C2 D1 Taxol today  Continue Protonix 20 mg daily for GERD  Continue imodium as needed for diarrhea  Continue Ondansetron as needed for nausea  Re-stage with bilateral breast MRI with and without contrast after completion of 4 cycles of neoadjuvant chemotherapy, mid July   RTC on 5/29/2025 labs/NP/C3 D1 Keytruda/Taxol/Carbo labs-CBC CMP TSH Free T4  C2 D8 5/15/2025 Taxol with CBC CMP MG  C2 D15 5/22/2025 Taxol with CBC CMP MG  C3 D8 6/05/2025 Taxol with CBC CMP MG  C3 D15 6/12/2025 Taxol with CBC CMP MG  CBC CMP MG prior to each treatment     Providers:         Orders for 04/03/2025:   Genetic testing  Start chemotherapy ASAP  Pharmacy are in the process of making  chemotherapy plan  Chemo teaching with nursing staff ASAP  Follow-up with NP within a week  Re-stage with bilateral breast MRI with and without contrast after completion of 4 cycles of neoadjuvant chemotherapy      Above discussed with the patient.  All questions answered.    Discussed labs and scans and gave her copies of relevant results.  She will have a formal chemotherapy teaching class with our nursing staff.  She understands and agrees with this plan.  ====================================     #IDC left breast, triple negative:  -screening mammogram 12/13/2023: BI-RADS 0  -diagnostic mammogram and ultrasound 01/27/2025:  BI-RADS: 5  -core biopsy 01/27/2025:  IDC, overall grade 3, triple negative  -ER negative (0%), OH negative (0%), HER2 negative (score 0), Ki-67 high proliferation (65%)  -radiologically, per mammogram and ultrasound 01/27/2025:  23 x 17 x 22 mm mass; no lymphadenopathy  -02/24/2025: Initial consultation: ECOG 0  -pre chemotherapy TTE 03/05/2025:  LVEF 55-60%  -staging CTs C/A/P and bone scan 03/06/2025: No distant metastases  -breast MRI 03/13/2025:  Left breast lesion 2.4 x 2.2 x 2.9 cm  -left IJ MediPort placed 03/24/2025  -left breast tumor 23 mm per mammogram and ultrasound, 2.9 cm per MRI, triple negative; G3  -cT2 cN0 MX, AJCC anatomic stage at least IIA, clinical prognostic stage at least IIB  >>>  Plan:  Postmenopausal  Triple negative breast cancers; ordered genetic testing and counseling 02/24/2025  Also, family history of ovarian cancer in sister; needs genetic testing  By definition, operable breast cancer  For TNBC, cT2, preoperative systemic therapy will be preferred  In any case, long-term outcomes are the same when patients are given chemotherapy preoperatively compared with postoperatively  We will restage with breast MRI after 4 cycles of chemotherapy, then, after completion of 8 cycles of chemotherapy     Clinical prognostic stage at least IIB, therefore, preoperative  followed by adjuvant:    Preoperative pembrolizumab/carboplatin/Taxol, followed by   preoperative pembrolizumab/Cyclophosphamide/doxorubicin or epirubicin, followed by   adjuvant pembrolizumab (category 1 recommendation)  (see below)  -If residual disease after preoperative therapy with taxane/alkylator/anthracycline based chemotherapy, then, capecitabine (patients in the [facility] trial did not receive capecitabine, therefore, there are no data on sequencing or to guide selection of one agent over the other, i.e., capecitabine versus olaparib if BRCA1/2 mutation positive)  -If germline BRCA1/2 mutation positive, then, adjuvant olaparib if residual disease after neoadjuvant chemotherapy  (patients in the Giacomo trial did not receive capecitabine, therefore, there are no data on sequencing or to guide selection of one agent over the other)     Preoperative systemic therapy  Pembrolizumab 200 mg IV day 1  Paclitaxel 80 mg/m² IV days 1, 8, 15  Carboplatin AUC 5 IV day 1  (Cycle every 21 days x 4 cycles) (cycles 1-4)  >>>  Followed by:  -Pembrolizumab 200 mg IV day 1  -Doxorubicin 60 mg/m² IV day 1  -Cyclophosphamide 600 mg/m² IV day 1  (Cycle every 21 days for 4 cycles (cycles 5-8)  Followed by:  Adjuvant:  Pembrolizumab 200 mg IV day 1  Cycle every 21 days x 9 cycles     Response assessment to preoperative systemic therapy:  Physical examination, mammogram, and/or breast ultrasound and/or breast MRI  MRI is more accurate than mammography for assessing tumor response to preoperative therapy     After preoperative systemic therapy, if BCS and axillary staging, then:  Adjuvant systemic therapy +whole breast radiotherapy     After preoperative systemic therapy, if mastectomy and axillary staging, then:  1. Adjuvant systemic therapy +postmastectomy radiotherapy  -if any ypN+: PMRT to chest wall +comprehensive RNI with inclusion of any portion of the undetected axilla at risk  -if cN0, ypN0, then:  If axilla was assessed by  SLNB or axillary dissection, then, no PMRT     Adjuvant systemic therapy after preoperative systemic therapy:  -consider adjuvant bisphosphonate therapy for risk reduction of distant metastases for 3-5 years in postmenopausal patients with a high-risk node-negative or node positive tumors; we will consider in this patient's; she will need dental evaluation and preventive Dentistry prior, in order to prevent/minimize likelihood of osteonecrosis of the jaw  1. If ypT0N0 or PCR, then:  Adjuvant pembrolizumab (if pembrolizumab containing regimen was given preoperatively)    2. If ypT1-4, N0 or ypN=/>1, then:  -adjuvant pembrolizumab; and/or  -adjuvant capecitabine (6-8 cycles); and/or  -adjuvant olaparib x1 year if germline BRCA1/2 mutation positive (category 1 recommendation)     #Family history of lung cancer and ovarian cancer:  Father experienced lung cancer at age 84 (was a smoker); sister experienced ovarian cancer at age 54; maternal aunt experienced unknown kind of cancer in her 60s  >>>  -have ordered genetic testing and counseling     #Obesity:  -12/12/2024: BMI 37.2  -02/13/2025: BMI 39.4     History of dyslipidemia, bilateral tinnitus, sensorineural hearing loss, psoriatic arthritis              I have explained all of the above in detail and the patient understands all of the current recommendation(s). I have answered all of their questions to the best of my ability and to their complete satisfaction.       Arlene Noonan, FNP-C  Oncology/Hematology   [facility]             [1]   Social History  Tobacco Use    Smoking status: Never     Passive exposure: Never    Smokeless tobacco: Never   Substance Use Topics    Alcohol use: Not Currently     Comment: I ONLY WINE MAYBE TWICE year    Drug use: Never

## 2025-05-08 ENCOUNTER — INFUSION (OUTPATIENT)
Dept: INFUSION THERAPY | Facility: HOSPITAL | Age: 65
End: 2025-05-08
Attending: INTERNAL MEDICINE
Payer: MEDICARE

## 2025-05-08 ENCOUNTER — OFFICE VISIT (OUTPATIENT)
Dept: HEMATOLOGY/ONCOLOGY | Facility: CLINIC | Age: 65
End: 2025-05-08
Payer: MEDICARE

## 2025-05-08 VITALS
DIASTOLIC BLOOD PRESSURE: 71 MMHG | TEMPERATURE: 98 F | WEIGHT: 222 LBS | RESPIRATION RATE: 18 BRPM | SYSTOLIC BLOOD PRESSURE: 135 MMHG | BODY MASS INDEX: 40.85 KG/M2 | HEIGHT: 62 IN | HEART RATE: 78 BPM | OXYGEN SATURATION: 95 %

## 2025-05-08 VITALS
TEMPERATURE: 98 F | DIASTOLIC BLOOD PRESSURE: 84 MMHG | OXYGEN SATURATION: 95 % | SYSTOLIC BLOOD PRESSURE: 139 MMHG | HEIGHT: 62 IN | WEIGHT: 222 LBS | BODY MASS INDEX: 40.85 KG/M2 | HEART RATE: 89 BPM | RESPIRATION RATE: 15 BRPM

## 2025-05-08 DIAGNOSIS — Z79.69 IMMUNODEFICIENCY DUE TO CHEMOTHERAPY: ICD-10-CM

## 2025-05-08 DIAGNOSIS — R53.83 OTHER FATIGUE: ICD-10-CM

## 2025-05-08 DIAGNOSIS — D84.821 IMMUNODEFICIENCY DUE TO CHEMOTHERAPY: ICD-10-CM

## 2025-05-08 DIAGNOSIS — Z78.0 POSTMENOPAUSAL: ICD-10-CM

## 2025-05-08 DIAGNOSIS — C50.912 INVASIVE DUCTAL CARCINOMA OF LEFT BREAST: Primary | ICD-10-CM

## 2025-05-08 DIAGNOSIS — T45.1X5A IMMUNODEFICIENCY DUE TO CHEMOTHERAPY: ICD-10-CM

## 2025-05-08 PROCEDURE — 99213 OFFICE O/P EST LOW 20 MIN: CPT | Mod: PBBFAC,25

## 2025-05-08 PROCEDURE — 96413 CHEMO IV INFUSION 1 HR: CPT

## 2025-05-08 PROCEDURE — 96417 CHEMO IV INFUS EACH ADDL SEQ: CPT

## 2025-05-08 PROCEDURE — 25000003 PHARM REV CODE 250

## 2025-05-08 PROCEDURE — 63600175 PHARM REV CODE 636 W HCPCS

## 2025-05-08 PROCEDURE — 96367 TX/PROPH/DG ADDL SEQ IV INF: CPT

## 2025-05-08 PROCEDURE — 96375 TX/PRO/DX INJ NEW DRUG ADDON: CPT

## 2025-05-08 RX ORDER — HEPARIN 100 UNIT/ML
500 SYRINGE INTRAVENOUS
Status: DISCONTINUED | OUTPATIENT
Start: 2025-05-08 | End: 2025-05-08 | Stop reason: HOSPADM

## 2025-05-08 RX ORDER — SODIUM CHLORIDE 0.9 % (FLUSH) 0.9 %
10 SYRINGE (ML) INJECTION
Status: DISCONTINUED | OUTPATIENT
Start: 2025-05-08 | End: 2025-05-08 | Stop reason: HOSPADM

## 2025-05-08 RX ORDER — FAMOTIDINE 10 MG/ML
20 INJECTION, SOLUTION INTRAVENOUS
Status: CANCELLED | OUTPATIENT
Start: 2025-05-08

## 2025-05-08 RX ORDER — DIPHENHYDRAMINE HYDROCHLORIDE 50 MG/ML
50 INJECTION, SOLUTION INTRAMUSCULAR; INTRAVENOUS
Status: COMPLETED | OUTPATIENT
Start: 2025-05-08 | End: 2025-05-08

## 2025-05-08 RX ORDER — EPINEPHRINE 0.3 MG/.3ML
0.3 INJECTION SUBCUTANEOUS ONCE AS NEEDED
Status: DISCONTINUED | OUTPATIENT
Start: 2025-05-08 | End: 2025-05-08 | Stop reason: HOSPADM

## 2025-05-08 RX ORDER — DIPHENHYDRAMINE HYDROCHLORIDE 50 MG/ML
50 INJECTION, SOLUTION INTRAMUSCULAR; INTRAVENOUS
Status: CANCELLED
Start: 2025-05-08

## 2025-05-08 RX ORDER — EPINEPHRINE 0.3 MG/.3ML
0.3 INJECTION SUBCUTANEOUS ONCE AS NEEDED
Status: CANCELLED | OUTPATIENT
Start: 2025-05-08

## 2025-05-08 RX ORDER — DIPHENHYDRAMINE HYDROCHLORIDE 50 MG/ML
50 INJECTION, SOLUTION INTRAMUSCULAR; INTRAVENOUS ONCE AS NEEDED
Status: CANCELLED | OUTPATIENT
Start: 2025-05-08

## 2025-05-08 RX ORDER — DIPHENHYDRAMINE HYDROCHLORIDE 50 MG/ML
50 INJECTION, SOLUTION INTRAMUSCULAR; INTRAVENOUS ONCE AS NEEDED
Status: DISCONTINUED | OUTPATIENT
Start: 2025-05-08 | End: 2025-05-08 | Stop reason: HOSPADM

## 2025-05-08 RX ORDER — FAMOTIDINE 10 MG/ML
20 INJECTION, SOLUTION INTRAVENOUS
Status: COMPLETED | OUTPATIENT
Start: 2025-05-08 | End: 2025-05-08

## 2025-05-08 RX ORDER — HEPARIN 100 UNIT/ML
500 SYRINGE INTRAVENOUS
Status: CANCELLED | OUTPATIENT
Start: 2025-05-08

## 2025-05-08 RX ORDER — SODIUM CHLORIDE 0.9 % (FLUSH) 0.9 %
10 SYRINGE (ML) INJECTION
Status: CANCELLED | OUTPATIENT
Start: 2025-05-08

## 2025-05-08 RX ADMIN — SODIUM CHLORIDE 200 MG: 9 INJECTION, SOLUTION INTRAVENOUS at 09:05

## 2025-05-08 RX ADMIN — PACLITAXEL 168 MG: 6 INJECTION, SOLUTION INTRAVENOUS at 11:05

## 2025-05-08 RX ADMIN — HEPARIN 500 UNITS: 100 SYRINGE at 12:05

## 2025-05-08 RX ADMIN — FAMOTIDINE 20 MG: 10 INJECTION, SOLUTION INTRAVENOUS at 10:05

## 2025-05-08 RX ADMIN — CARBOPLATIN 705 MG: 10 INJECTION, SOLUTION INTRAVENOUS at 12:05

## 2025-05-08 RX ADMIN — APREPITANT 130 MG: 130 INJECTION, EMULSION INTRAVENOUS at 10:05

## 2025-05-08 RX ADMIN — DEXAMETHASONE SODIUM PHOSPHATE 0.25 MG: 4 INJECTION, SOLUTION INTRA-ARTICULAR; INTRALESIONAL; INTRAMUSCULAR; INTRAVENOUS; SOFT TISSUE at 10:05

## 2025-05-08 RX ADMIN — SODIUM CHLORIDE: 9 INJECTION, SOLUTION INTRAVENOUS at 09:05

## 2025-05-08 RX ADMIN — DIPHENHYDRAMINE HYDROCHLORIDE 50 MG: 50 INJECTION INTRAMUSCULAR; INTRAVENOUS at 10:05

## 2025-05-08 NOTE — NURSING
0858 Patient is here for labs, NP visit & C2D1 Keytruda, Taxol, Carbo.     1300 Infusions given via R chest mediport.

## 2025-05-14 ENCOUNTER — PATIENT MESSAGE (OUTPATIENT)
Dept: ADMINISTRATIVE | Facility: HOSPITAL | Age: 65
End: 2025-05-14
Payer: MEDICARE

## 2025-05-15 ENCOUNTER — INFUSION (OUTPATIENT)
Dept: INFUSION THERAPY | Facility: HOSPITAL | Age: 65
End: 2025-05-15
Attending: INTERNAL MEDICINE
Payer: MEDICARE

## 2025-05-15 ENCOUNTER — LAB VISIT (OUTPATIENT)
Dept: HEMATOLOGY/ONCOLOGY | Facility: CLINIC | Age: 65
End: 2025-05-15
Payer: MEDICARE

## 2025-05-15 VITALS
HEART RATE: 71 BPM | WEIGHT: 220.69 LBS | RESPIRATION RATE: 20 BRPM | OXYGEN SATURATION: 97 % | SYSTOLIC BLOOD PRESSURE: 138 MMHG | BODY MASS INDEX: 40.61 KG/M2 | DIASTOLIC BLOOD PRESSURE: 79 MMHG | TEMPERATURE: 98 F | HEIGHT: 62 IN

## 2025-05-15 DIAGNOSIS — C50.912 INVASIVE DUCTAL CARCINOMA OF LEFT BREAST: Primary | ICD-10-CM

## 2025-05-15 DIAGNOSIS — R53.83 OTHER FATIGUE: ICD-10-CM

## 2025-05-15 DIAGNOSIS — E83.42 HYPOMAGNESEMIA: Primary | ICD-10-CM

## 2025-05-15 DIAGNOSIS — Z79.69 IMMUNODEFICIENCY DUE TO CHEMOTHERAPY: ICD-10-CM

## 2025-05-15 DIAGNOSIS — D84.821 IMMUNODEFICIENCY DUE TO CHEMOTHERAPY: ICD-10-CM

## 2025-05-15 DIAGNOSIS — Z12.11 SCREENING FOR COLON CANCER: ICD-10-CM

## 2025-05-15 DIAGNOSIS — C50.912 INVASIVE DUCTAL CARCINOMA OF LEFT BREAST: ICD-10-CM

## 2025-05-15 DIAGNOSIS — T45.1X5A IMMUNODEFICIENCY DUE TO CHEMOTHERAPY: ICD-10-CM

## 2025-05-15 DIAGNOSIS — C50.912 INVASIVE DUCTAL CARCINOMA OF BREAST, FEMALE, LEFT: ICD-10-CM

## 2025-05-15 DIAGNOSIS — Z78.0 POSTMENOPAUSAL: ICD-10-CM

## 2025-05-15 LAB
ALBUMIN SERPL-MCNC: 3.1 G/DL (ref 3.4–4.8)
ALBUMIN/GLOB SERPL: 0.8 RATIO (ref 1.1–2)
ALP SERPL-CCNC: 121 UNIT/L (ref 40–150)
ALT SERPL-CCNC: 28 UNIT/L (ref 0–55)
ANION GAP SERPL CALC-SCNC: 5 MEQ/L
AST SERPL-CCNC: 17 UNIT/L (ref 11–45)
BASOPHILS # BLD AUTO: 0.02 X10(3)/MCL
BASOPHILS NFR BLD AUTO: 0.4 %
BILIRUB SERPL-MCNC: 0.3 MG/DL
BUN SERPL-MCNC: 12.2 MG/DL (ref 9.8–20.1)
CALCIUM SERPL-MCNC: 9.1 MG/DL (ref 8.4–10.2)
CHLORIDE SERPL-SCNC: 106 MMOL/L (ref 98–107)
CO2 SERPL-SCNC: 26 MMOL/L (ref 23–31)
CREAT SERPL-MCNC: 0.79 MG/DL (ref 0.55–1.02)
CREAT/UREA NIT SERPL: 15
EOSINOPHIL # BLD AUTO: 0.03 X10(3)/MCL (ref 0–0.9)
EOSINOPHIL NFR BLD AUTO: 0.6 %
ERYTHROCYTE [DISTWIDTH] IN BLOOD BY AUTOMATED COUNT: 12.8 % (ref 11.5–17)
GFR SERPLBLD CREATININE-BSD FMLA CKD-EPI: >60 ML/MIN/1.73/M2
GLOBULIN SER-MCNC: 3.7 GM/DL (ref 2.4–3.5)
GLUCOSE SERPL-MCNC: 108 MG/DL (ref 82–115)
HCT VFR BLD AUTO: 36.2 % (ref 37–47)
HGB BLD-MCNC: 12 G/DL (ref 12–16)
IMM GRANULOCYTES # BLD AUTO: 0.03 X10(3)/MCL (ref 0–0.04)
IMM GRANULOCYTES NFR BLD AUTO: 0.6 %
LYMPHOCYTES # BLD AUTO: 2.07 X10(3)/MCL (ref 0.6–4.6)
LYMPHOCYTES NFR BLD AUTO: 42.9 %
MAGNESIUM SERPL-MCNC: 1.4 MG/DL (ref 1.6–2.6)
MCH RBC QN AUTO: 31.1 PG (ref 27–31)
MCHC RBC AUTO-ENTMCNC: 33.1 G/DL (ref 33–36)
MCV RBC AUTO: 93.8 FL (ref 80–94)
MONOCYTES # BLD AUTO: 0.34 X10(3)/MCL (ref 0.1–1.3)
MONOCYTES NFR BLD AUTO: 7.1 %
NEUTROPHILS # BLD AUTO: 2.33 X10(3)/MCL (ref 2.1–9.2)
NEUTROPHILS NFR BLD AUTO: 48.4 %
NRBC BLD AUTO-RTO: 0 %
PLATELET # BLD AUTO: 211 X10(3)/MCL (ref 130–400)
PMV BLD AUTO: 9.7 FL (ref 7.4–10.4)
POTASSIUM SERPL-SCNC: 4.4 MMOL/L (ref 3.5–5.1)
PROT SERPL-MCNC: 6.8 GM/DL (ref 5.8–7.6)
RBC # BLD AUTO: 3.86 X10(6)/MCL (ref 4.2–5.4)
SODIUM SERPL-SCNC: 137 MMOL/L (ref 136–145)
WBC # BLD AUTO: 4.82 X10(3)/MCL (ref 4.5–11.5)

## 2025-05-15 PROCEDURE — 25000003 PHARM REV CODE 250

## 2025-05-15 PROCEDURE — 80053 COMPREHEN METABOLIC PANEL: CPT

## 2025-05-15 PROCEDURE — 85025 COMPLETE CBC W/AUTO DIFF WBC: CPT

## 2025-05-15 PROCEDURE — 96413 CHEMO IV INFUSION 1 HR: CPT

## 2025-05-15 PROCEDURE — 63600175 PHARM REV CODE 636 W HCPCS

## 2025-05-15 PROCEDURE — 83735 ASSAY OF MAGNESIUM: CPT

## 2025-05-15 PROCEDURE — A4216 STERILE WATER/SALINE, 10 ML: HCPCS

## 2025-05-15 PROCEDURE — 96375 TX/PRO/DX INJ NEW DRUG ADDON: CPT

## 2025-05-15 RX ORDER — DEXAMETHASONE SODIUM PHOSPHATE 10 MG/ML
10 INJECTION INTRAMUSCULAR; INTRAVENOUS
Status: CANCELLED | OUTPATIENT
Start: 2025-05-15

## 2025-05-15 RX ORDER — SODIUM CHLORIDE 0.9 % (FLUSH) 0.9 %
10 SYRINGE (ML) INJECTION
Status: DISCONTINUED | OUTPATIENT
Start: 2025-05-15 | End: 2025-05-15 | Stop reason: HOSPADM

## 2025-05-15 RX ORDER — FAMOTIDINE 10 MG/ML
20 INJECTION, SOLUTION INTRAVENOUS
Status: CANCELLED | OUTPATIENT
Start: 2025-05-15

## 2025-05-15 RX ORDER — DIPHENHYDRAMINE HYDROCHLORIDE 50 MG/ML
50 INJECTION, SOLUTION INTRAMUSCULAR; INTRAVENOUS ONCE AS NEEDED
Status: CANCELLED | OUTPATIENT
Start: 2025-05-15

## 2025-05-15 RX ORDER — EPINEPHRINE 0.3 MG/.3ML
0.3 INJECTION SUBCUTANEOUS ONCE AS NEEDED
Status: CANCELLED | OUTPATIENT
Start: 2025-05-15

## 2025-05-15 RX ORDER — LANOLIN ALCOHOL/MO/W.PET/CERES
400 CREAM (GRAM) TOPICAL DAILY
Qty: 14 TABLET | Refills: 0 | Status: SHIPPED | OUTPATIENT
Start: 2025-05-15 | End: 2025-05-29

## 2025-05-15 RX ORDER — FAMOTIDINE 10 MG/ML
20 INJECTION, SOLUTION INTRAVENOUS
Status: COMPLETED | OUTPATIENT
Start: 2025-05-15 | End: 2025-05-15

## 2025-05-15 RX ORDER — HEPARIN 100 UNIT/ML
500 SYRINGE INTRAVENOUS
Status: DISCONTINUED | OUTPATIENT
Start: 2025-05-15 | End: 2025-05-15 | Stop reason: HOSPADM

## 2025-05-15 RX ORDER — DEXAMETHASONE SODIUM PHOSPHATE 10 MG/ML
10 INJECTION INTRAMUSCULAR; INTRAVENOUS
Status: COMPLETED | OUTPATIENT
Start: 2025-05-15 | End: 2025-05-15

## 2025-05-15 RX ORDER — SODIUM CHLORIDE 0.9 % (FLUSH) 0.9 %
10 SYRINGE (ML) INJECTION
Status: CANCELLED | OUTPATIENT
Start: 2025-05-15

## 2025-05-15 RX ORDER — DIPHENHYDRAMINE HYDROCHLORIDE 50 MG/ML
50 INJECTION, SOLUTION INTRAMUSCULAR; INTRAVENOUS ONCE AS NEEDED
Status: DISCONTINUED | OUTPATIENT
Start: 2025-05-15 | End: 2025-05-15 | Stop reason: HOSPADM

## 2025-05-15 RX ORDER — HEPARIN 100 UNIT/ML
500 SYRINGE INTRAVENOUS
Status: CANCELLED | OUTPATIENT
Start: 2025-05-15

## 2025-05-15 RX ORDER — EPINEPHRINE 1 MG/ML
0.3 INJECTION INTRAMUSCULAR; INTRAVENOUS; SUBCUTANEOUS ONCE AS NEEDED
Status: DISCONTINUED | OUTPATIENT
Start: 2025-05-15 | End: 2025-05-15 | Stop reason: HOSPADM

## 2025-05-15 RX ORDER — DIPHENHYDRAMINE HYDROCHLORIDE 50 MG/ML
50 INJECTION, SOLUTION INTRAMUSCULAR; INTRAVENOUS
Status: COMPLETED | OUTPATIENT
Start: 2025-05-15 | End: 2025-05-15

## 2025-05-15 RX ORDER — DIPHENHYDRAMINE HYDROCHLORIDE 50 MG/ML
50 INJECTION, SOLUTION INTRAMUSCULAR; INTRAVENOUS
Status: CANCELLED
Start: 2025-05-15

## 2025-05-15 RX ADMIN — Medication 10 ML: at 12:05

## 2025-05-15 RX ADMIN — FAMOTIDINE 20 MG: 10 INJECTION, SOLUTION INTRAVENOUS at 10:05

## 2025-05-15 RX ADMIN — PACLITAXEL 168 MG: 6 INJECTION, SOLUTION INTRAVENOUS at 11:05

## 2025-05-15 RX ADMIN — DIPHENHYDRAMINE HYDROCHLORIDE 50 MG: 50 INJECTION INTRAMUSCULAR; INTRAVENOUS at 10:05

## 2025-05-15 RX ADMIN — DEXAMETHASONE SODIUM PHOSPHATE 10 MG: 10 INJECTION INTRAMUSCULAR; INTRAVENOUS at 10:05

## 2025-05-15 RX ADMIN — SODIUM CHLORIDE: 9 INJECTION, SOLUTION INTRAVENOUS at 10:05

## 2025-05-15 RX ADMIN — HEPARIN 500 UNITS: 100 SYRINGE at 12:05

## 2025-05-15 NOTE — NURSING
1004  Pt did labs and is here for C 2 D 8 taxol.  Pt accomp by her .  Pt denies any pain.  Does report nausea, diarrhea, and fatigue.

## 2025-05-21 DIAGNOSIS — T45.1X5A IMMUNODEFICIENCY DUE TO CHEMOTHERAPY: Primary | ICD-10-CM

## 2025-05-21 DIAGNOSIS — D84.821 IMMUNODEFICIENCY DUE TO CHEMOTHERAPY: Primary | ICD-10-CM

## 2025-05-21 DIAGNOSIS — Z79.69 IMMUNODEFICIENCY DUE TO CHEMOTHERAPY: Primary | ICD-10-CM

## 2025-05-22 ENCOUNTER — INFUSION (OUTPATIENT)
Dept: INFUSION THERAPY | Facility: HOSPITAL | Age: 65
End: 2025-05-22
Attending: INTERNAL MEDICINE
Payer: MEDICARE

## 2025-05-22 ENCOUNTER — LAB VISIT (OUTPATIENT)
Dept: HEMATOLOGY/ONCOLOGY | Facility: CLINIC | Age: 65
End: 2025-05-22
Payer: MEDICARE

## 2025-05-22 VITALS
WEIGHT: 225.69 LBS | RESPIRATION RATE: 18 BRPM | DIASTOLIC BLOOD PRESSURE: 86 MMHG | HEART RATE: 70 BPM | BODY MASS INDEX: 41.53 KG/M2 | OXYGEN SATURATION: 98 % | TEMPERATURE: 98 F | HEIGHT: 62 IN | SYSTOLIC BLOOD PRESSURE: 164 MMHG

## 2025-05-22 DIAGNOSIS — Z78.0 POSTMENOPAUSAL: ICD-10-CM

## 2025-05-22 DIAGNOSIS — C50.912 INVASIVE DUCTAL CARCINOMA OF LEFT BREAST: ICD-10-CM

## 2025-05-22 DIAGNOSIS — D84.821 IMMUNODEFICIENCY DUE TO CHEMOTHERAPY: ICD-10-CM

## 2025-05-22 DIAGNOSIS — Z79.69 IMMUNODEFICIENCY DUE TO CHEMOTHERAPY: ICD-10-CM

## 2025-05-22 DIAGNOSIS — R53.83 OTHER FATIGUE: ICD-10-CM

## 2025-05-22 DIAGNOSIS — C50.912 INVASIVE DUCTAL CARCINOMA OF LEFT BREAST: Primary | ICD-10-CM

## 2025-05-22 DIAGNOSIS — T45.1X5A IMMUNODEFICIENCY DUE TO CHEMOTHERAPY: ICD-10-CM

## 2025-05-22 LAB
ALBUMIN SERPL-MCNC: 3 G/DL (ref 3.4–4.8)
ALBUMIN/GLOB SERPL: 0.9 RATIO (ref 1.1–2)
ALP SERPL-CCNC: 117 UNIT/L (ref 40–150)
ALT SERPL-CCNC: 27 UNIT/L (ref 0–55)
ANION GAP SERPL CALC-SCNC: 6 MEQ/L
AST SERPL-CCNC: 17 UNIT/L (ref 11–45)
BASOPHILS # BLD AUTO: 0.01 X10(3)/MCL
BASOPHILS NFR BLD AUTO: 0.2 %
BILIRUB SERPL-MCNC: 0.2 MG/DL
BUN SERPL-MCNC: 13.1 MG/DL (ref 9.8–20.1)
CALCIUM SERPL-MCNC: 9.4 MG/DL (ref 8.4–10.2)
CHLORIDE SERPL-SCNC: 107 MMOL/L (ref 98–107)
CO2 SERPL-SCNC: 28 MMOL/L (ref 23–31)
CREAT SERPL-MCNC: 0.73 MG/DL (ref 0.55–1.02)
CREAT/UREA NIT SERPL: 18
EOSINOPHIL # BLD AUTO: 0.01 X10(3)/MCL (ref 0–0.9)
EOSINOPHIL NFR BLD AUTO: 0.2 %
ERYTHROCYTE [DISTWIDTH] IN BLOOD BY AUTOMATED COUNT: 13.4 % (ref 11.5–17)
GFR SERPLBLD CREATININE-BSD FMLA CKD-EPI: >60 ML/MIN/1.73/M2
GLOBULIN SER-MCNC: 3.4 GM/DL (ref 2.4–3.5)
GLUCOSE SERPL-MCNC: 104 MG/DL (ref 82–115)
HCT VFR BLD AUTO: 33.6 % (ref 37–47)
HGB BLD-MCNC: 11 G/DL (ref 12–16)
IMM GRANULOCYTES # BLD AUTO: 0.09 X10(3)/MCL (ref 0–0.04)
IMM GRANULOCYTES NFR BLD AUTO: 1.9 %
LYMPHOCYTES # BLD AUTO: 2.15 X10(3)/MCL (ref 0.6–4.6)
LYMPHOCYTES NFR BLD AUTO: 44.6 %
MAGNESIUM SERPL-MCNC: 1.6 MG/DL (ref 1.6–2.6)
MCH RBC QN AUTO: 30.7 PG (ref 27–31)
MCHC RBC AUTO-ENTMCNC: 32.7 G/DL (ref 33–36)
MCV RBC AUTO: 93.9 FL (ref 80–94)
MONOCYTES # BLD AUTO: 0.4 X10(3)/MCL (ref 0.1–1.3)
MONOCYTES NFR BLD AUTO: 8.3 %
NEUTROPHILS # BLD AUTO: 2.16 X10(3)/MCL (ref 2.1–9.2)
NEUTROPHILS NFR BLD AUTO: 44.8 %
NRBC BLD AUTO-RTO: 0.4 %
PLATELET # BLD AUTO: 195 X10(3)/MCL (ref 130–400)
PMV BLD AUTO: 10.1 FL (ref 7.4–10.4)
POTASSIUM SERPL-SCNC: 4.4 MMOL/L (ref 3.5–5.1)
PROT SERPL-MCNC: 6.4 GM/DL (ref 5.8–7.6)
RBC # BLD AUTO: 3.58 X10(6)/MCL (ref 4.2–5.4)
SODIUM SERPL-SCNC: 141 MMOL/L (ref 136–145)
WBC # BLD AUTO: 4.82 X10(3)/MCL (ref 4.5–11.5)

## 2025-05-22 PROCEDURE — 85025 COMPLETE CBC W/AUTO DIFF WBC: CPT

## 2025-05-22 PROCEDURE — 25000003 PHARM REV CODE 250

## 2025-05-22 PROCEDURE — 63600175 PHARM REV CODE 636 W HCPCS

## 2025-05-22 PROCEDURE — 80053 COMPREHEN METABOLIC PANEL: CPT

## 2025-05-22 PROCEDURE — 96413 CHEMO IV INFUSION 1 HR: CPT

## 2025-05-22 PROCEDURE — 83735 ASSAY OF MAGNESIUM: CPT

## 2025-05-22 PROCEDURE — 96375 TX/PRO/DX INJ NEW DRUG ADDON: CPT

## 2025-05-22 RX ORDER — DEXAMETHASONE SODIUM PHOSPHATE 10 MG/ML
10 INJECTION INTRAMUSCULAR; INTRAVENOUS
Status: CANCELLED | OUTPATIENT
Start: 2025-05-22

## 2025-05-22 RX ORDER — EPINEPHRINE 1 MG/ML
0.3 INJECTION INTRAMUSCULAR; INTRAVENOUS; SUBCUTANEOUS ONCE AS NEEDED
Status: DISCONTINUED | OUTPATIENT
Start: 2025-05-22 | End: 2025-05-22 | Stop reason: HOSPADM

## 2025-05-22 RX ORDER — HEPARIN 100 UNIT/ML
500 SYRINGE INTRAVENOUS
Status: DISCONTINUED | OUTPATIENT
Start: 2025-05-22 | End: 2025-05-22 | Stop reason: HOSPADM

## 2025-05-22 RX ORDER — DIPHENHYDRAMINE HYDROCHLORIDE 50 MG/ML
50 INJECTION, SOLUTION INTRAMUSCULAR; INTRAVENOUS ONCE AS NEEDED
Status: DISCONTINUED | OUTPATIENT
Start: 2025-05-22 | End: 2025-05-22 | Stop reason: HOSPADM

## 2025-05-22 RX ORDER — HEPARIN 100 UNIT/ML
500 SYRINGE INTRAVENOUS
Status: CANCELLED | OUTPATIENT
Start: 2025-05-22

## 2025-05-22 RX ORDER — EPINEPHRINE 0.3 MG/.3ML
0.3 INJECTION SUBCUTANEOUS ONCE AS NEEDED
Status: CANCELLED | OUTPATIENT
Start: 2025-05-22

## 2025-05-22 RX ORDER — DIPHENHYDRAMINE HYDROCHLORIDE 50 MG/ML
50 INJECTION, SOLUTION INTRAMUSCULAR; INTRAVENOUS
Status: CANCELLED
Start: 2025-05-22

## 2025-05-22 RX ORDER — DEXAMETHASONE SODIUM PHOSPHATE 10 MG/ML
10 INJECTION INTRAMUSCULAR; INTRAVENOUS
Status: COMPLETED | OUTPATIENT
Start: 2025-05-22 | End: 2025-05-22

## 2025-05-22 RX ORDER — FAMOTIDINE 10 MG/ML
20 INJECTION, SOLUTION INTRAVENOUS
Status: CANCELLED | OUTPATIENT
Start: 2025-05-22

## 2025-05-22 RX ORDER — SODIUM CHLORIDE 0.9 % (FLUSH) 0.9 %
10 SYRINGE (ML) INJECTION
Status: CANCELLED | OUTPATIENT
Start: 2025-05-22

## 2025-05-22 RX ORDER — SODIUM CHLORIDE 0.9 % (FLUSH) 0.9 %
10 SYRINGE (ML) INJECTION
Status: DISCONTINUED | OUTPATIENT
Start: 2025-05-22 | End: 2025-05-22 | Stop reason: HOSPADM

## 2025-05-22 RX ORDER — FAMOTIDINE 10 MG/ML
20 INJECTION, SOLUTION INTRAVENOUS
Status: COMPLETED | OUTPATIENT
Start: 2025-05-22 | End: 2025-05-22

## 2025-05-22 RX ORDER — DIPHENHYDRAMINE HYDROCHLORIDE 50 MG/ML
50 INJECTION, SOLUTION INTRAMUSCULAR; INTRAVENOUS
Status: COMPLETED | OUTPATIENT
Start: 2025-05-22 | End: 2025-05-22

## 2025-05-22 RX ORDER — DIPHENHYDRAMINE HYDROCHLORIDE 50 MG/ML
50 INJECTION, SOLUTION INTRAMUSCULAR; INTRAVENOUS ONCE AS NEEDED
Status: CANCELLED | OUTPATIENT
Start: 2025-05-22

## 2025-05-22 RX ADMIN — DEXAMETHASONE SODIUM PHOSPHATE 10 MG: 10 INJECTION INTRAMUSCULAR; INTRAVENOUS at 09:05

## 2025-05-22 RX ADMIN — PACLITAXEL 168 MG: 6 INJECTION, SOLUTION INTRAVENOUS at 10:05

## 2025-05-22 RX ADMIN — SODIUM CHLORIDE: 9 INJECTION, SOLUTION INTRAVENOUS at 09:05

## 2025-05-22 RX ADMIN — DIPHENHYDRAMINE HYDROCHLORIDE 50 MG: 50 INJECTION INTRAMUSCULAR; INTRAVENOUS at 09:05

## 2025-05-22 RX ADMIN — HEPARIN 500 UNITS: 100 SYRINGE at 11:05

## 2025-05-22 RX ADMIN — FAMOTIDINE 20 MG: 10 INJECTION, SOLUTION INTRAVENOUS at 09:05

## 2025-05-22 NOTE — NURSING
Patient is here for labs & C2D15 Taxol. She is accompanied by her sister. Patient voices no c/o.     1110 [facility]

## 2025-05-28 ENCOUNTER — TELEPHONE (OUTPATIENT)
Dept: HEMATOLOGY/ONCOLOGY | Facility: CLINIC | Age: 65
End: 2025-05-28
Payer: MEDICARE

## 2025-05-28 NOTE — PROGRESS NOTES
[facility] Hematology/Oncology  PROGRESS NOTE    Subjective:       Patient ID: Alma Rosa Disla is a 65 y.o. female.    Diagnosis:   -IDC left breast, biopsy 01/27/2025, triple negative, overall grade 3; cT2 cN0 MX, AJCC anatomic stage at least IIA, clinical prognostic stage at least IIB  -postmenopausal  -father experienced lung cancer; sister experienced ovarian cancer  -obesity    Current Treatment:  Left IJ Mediport placed 03/24/2025    Keytruda/Carboplatin/Paclitaxel every 21 days x 4 cycles   Paclitaxel Day 1, 8, 15  Started on 04/17/2025    Treatment History:    Chief Complaint: Follow-up (Patient reports fatigue, SOB and numbness and tingling in both hands and feet.)    HPI:  64-year-old lady, referred from [facility] breast Clinic, with invasive ductal carcinoma.     Investigations reviewed:  -12/13/2023:  Bilateral screening mammogram (comparison:  10/23/2013 mammogram):  BI-RADS category 0-incomplete  -01/27/2025:  bilateral diagnostic mammogram, limited ultrasound left breast:  HISTORY: 64-year-old female presents for further evaluation of a mass in the upper-outer quadrant of the left breast recalled from screening at [facility] on 12/13/2023.    Overall study BI-RADS: 5-highly suggestive of malignancy (per mammogram, up to 23 mm mass; per ultrasound, 23 x 17 x 22 mm mass)  -01/27/2025:  left breast mass 2 o'clock, 7 cm from nipple, ultrasound-guided needle core biopsy:  IDC, overall grade 3  -ER negative (0%), CT negative (0%), HER2 negative (score 0), Ki-67 high proliferation (65%)     02/24/2025:    Pleasant, healthy-appearing lady who presents for initial medical oncology consultation, accompanied by .  In no acute discomfort.    Overall, feels well and healthy.  Great appetite.  Never noticed any breast lumps, breast pain, nipple discharge, skin or nipple changes.  Never noticed regional lymphadenopathy.    No anorexia, unintentional weight loss, weakness, fatigue, unusual headaches, focal  neurological symptoms, chest pain, cough, dyspnea, abdominal pain, nausea, vomiting, GI bleeding, change in bowel habits, bone pains, any urinary problems, postmenopausal spotting, etc..  ECOG 0.    Past medical history: Dyslipidemia; postmenopausal; sensorineural hearing loss; obesity; psoriatic arthritis; JENNIFER positive; bilateral tinnitus  Procedure/surgical history: Adenoidectomy; cholecystectomy; colonoscopy; tonsillectomy    -2025:  Says that she underwent colonoscopy with [facility] 3 years ago, and that it showed 1 polyp; apparently, the recommended repeat colonoscopy in 5 years  Social history:  .  Lives in [place].  Has 1 child.  A 41-year-old son  from complications of diabetes mellitus.  She cleans houses.  She smoked <1 ppd x2 years; quit 40 years ago.  No alcohol or illicit drug abuse.  Family history: Father experienced lung cancer at age 84 (was a smoker); sister experienced ovarian cancer at age 54; maternal aunt experienced unknown kind of cancer in her 60s  Health maintenance:   -2023:  Thin prep cervical Pap smear:  NILM, high-risk HPV negative  -2025:  Says that she underwent colonoscopy with [facility] 3 years ago, and that it showed 1 polyp; apparently, the recommended repeat colonoscopy in 5 years  Menstrual/Ob gyn history: Menarche at age 13; LMP at age 60; no menopausal symptoms except for irritability x3-4 years; never took hormone replacement therapy; last OCP use at age 36 (used OCPs for 3 years); for pregnancies; 2 live births; 2 miscarriages;  her 2nd child for 6 months; 1st child at age 22    Interval History:  Patient presents to clinic for toxicity check and treatment clearance for cycle 3 day 1 of Keytruda/Taxol/carbopaltin.  She does report occasional fatigue relieved with rest and metallic taste. She is reporting peripheral neuropathy to bilateral fingertips and toes. She is also reporting dyspnea with  exertion. She continues with water aerobics twice weekly. She denies any chest pain, unusual headache, dizziness, vision changes, fever, diarrhea, or any new pain.  Labs and plan of care reviewed in detail with patient, verbalized understanding    PMX/PSHx  Past Medical History:   Diagnosis Date    Breast cancer     left    Hyperlipidemia     Menopause 4/27/20020      Past Surgical History:   Procedure Laterality Date    ADENOIDECTOMY      CHOLECYSTECTOMY  1998    COLONOSCOPY      with biopsy    INSERTION OF TUNNELED CENTRAL VENOUS CATHETER (CVC) WITH SUBCUTANEOUS PORT N/A 3/24/2025    Procedure: [id number];  Surgeon: Vik Lynch Jr., MD;  Location: [facility];  Service: General;  Laterality: N/A;  Likely right    OCCLUSION, FALLOPIAN TUBE, USING DEVICE, BY VAGINAL OR SUPRAPUBIC APPROACH Bilateral     TONSILLECTOMY  1975     Allergies:  Review of patient's allergies indicates:  No Known Allergies   Social History:   Social History[1]  Medications:  Current Outpatient Medications   Medication Instructions    dexAMETHasone (DECADRON) 8 mg, Oral, Daily, Take 2 tablets (8 mg) by mouth once daily on days 2,3,4 following chemotherapy.    magnesium oxide (MAG-OX) 400 mg, Oral, Daily    omega-3 acid ethyl esters (LOVAZA) 2 g, 2 times daily    pantoprazole (PROTONIX) 20 mg, Oral, Daily     ROS:  Review of Systems   Constitutional:  Positive for fatigue. Negative for appetite change, chills, fever and unexpected weight change.   HENT:  Negative for facial swelling, mouth sores, nosebleeds, sinus pressure/congestion and sore throat.    Eyes:  Negative for photophobia, pain and visual disturbance.   Respiratory:  Positive for shortness of breath. Negative for cough, chest tightness and wheezing.    Cardiovascular:  Negative for chest pain, palpitations and leg swelling.   Gastrointestinal:  Negative for abdominal pain, blood in stool, change in bowel habit, constipation, diarrhea, nausea and vomiting.    Endocrine: Negative.    Genitourinary:  Negative for dysuria, frequency, hematuria, hot flashes, pelvic pain, urgency and vaginal pain.   Musculoskeletal:  Negative for arthralgias, back pain, gait problem, joint swelling and myalgias.   Integumentary:  Negative for pallor, rash, wound, breast mass, breast discharge and breast tenderness.   Allergic/Immunologic: Negative.  Negative for immunocompromised state.   Neurological:  Positive for numbness. Negative for dizziness, vertigo, syncope, weakness and headaches.   Hematological:  Negative for adenopathy. Does not bruise/bleed easily.   Psychiatric/Behavioral:  Negative for behavioral problems and dysphoric mood. The patient is not nervous/anxious.    All other systems reviewed and are negative.  Breast: Negative for mass and tenderness      Objective:   Vitals:  Vitals:    05/29/25 0825   BP: 139/81   Pulse: 77   Resp: 18   Temp: 98.1 °F (36.7 °C)        Wt Readings from Last 3 Encounters:   05/29/25 0825 103 kg (227 lb)   05/22/25 0830 102.4 kg (225 lb 11.2 oz)   05/15/25 1004 100.1 kg (220 lb 10.9 oz)      Physical Examination:  Physical Exam  Vitals and nursing note reviewed.   HENT:      Head: Normocephalic and atraumatic.      Mouth/Throat:      Mouth: Mucous membranes are moist.      Pharynx: Oropharynx is clear.   Eyes:      Extraocular Movements: Extraocular movements intact.      Conjunctiva/sclera: Conjunctivae normal.      Pupils: Pupils are equal, round, and reactive to light.   Cardiovascular:      Rate and Rhythm: Normal rate and regular rhythm.   Pulmonary:      Effort: Pulmonary effort is normal.      Breath sounds: Normal breath sounds.   Abdominal:      General: Abdomen is flat. Bowel sounds are normal.      Palpations: Abdomen is soft.   Musculoskeletal:         General: Normal range of motion.      Cervical back: Normal range of motion and neck supple.   Skin:     General: Skin is warm and dry.   Neurological:      General: No focal deficit  present.      Mental Status: She is alert.   Psychiatric:         Mood and Affect: Mood normal.       ECOG SCORE           Labs:  Lab Visit on 05/29/2025   Component Date Value Ref Range Status    Sodium 05/29/2025 140  136 - 145 mmol/L Final    Potassium 05/29/2025 4.3  3.5 - 5.1 mmol/L Final    Chloride 05/29/2025 106  98 - 107 mmol/L Final    CO2 05/29/2025 27  23 - 31 mmol/L Final    Glucose 05/29/2025 93  82 - 115 mg/dL Final    Blood Urea Nitrogen 05/29/2025 18.1  9.8 - 20.1 mg/dL Final    Creatinine 05/29/2025 0.77  0.55 - 1.02 mg/dL Final    Calcium 05/29/2025 9.2  8.4 - 10.2 mg/dL Final    Protein Total 05/29/2025 6.5  5.8 - 7.6 gm/dL Final    Albumin 05/29/2025 3.1 (L)  3.4 - 4.8 g/dL Final    Globulin 05/29/2025 3.4  2.4 - 3.5 gm/dL Final    Albumin/Globulin Ratio 05/29/2025 0.9 (L)  1.1 - 2.0 ratio Final    Bilirubin Total 05/29/2025 0.3  <=1.5 mg/dL Final    ALP 05/29/2025 116  40 - 150 unit/L Final    ALT 05/29/2025 22  0 - 55 unit/L Final    AST 05/29/2025 15  11 - 45 unit/L Final    eGFR 05/29/2025 >60  mL/min/1.73/m2 Final    Estimated GFR calculated using the CKD-EPI creatinine (2021) equation.    Anion Gap 05/29/2025 7.0  mEq/L Final    BUN/Creatinine Ratio 05/29/2025 24   Final    WBC 05/29/2025 5.25  4.50 - 11.50 x10(3)/mcL Final    RBC 05/29/2025 3.50 (L)  4.20 - 5.40 x10(6)/mcL Final    Hgb 05/29/2025 10.9 (L)  12.0 - 16.0 g/dL Final    Hct 05/29/2025 33.3 (L)  37.0 - 47.0 % Final    MCV 05/29/2025 95.1 (H)  80.0 - 94.0 fL Final    MCH 05/29/2025 31.1 (H)  27.0 - 31.0 pg Final    MCHC 05/29/2025 32.7 (L)  33.0 - 36.0 g/dL Final    RDW 05/29/2025 14.2  11.5 - 17.0 % Final    Platelet 05/29/2025 161  130 - 400 x10(3)/mcL Final    MPV 05/29/2025 10.2  7.4 - 10.4 fL Final    Neut % 05/29/2025 57.6  % Final    Lymph % 05/29/2025 35.2  % Final    Mono % 05/29/2025 5.1  % Final    Eos % 05/29/2025 0.4  % Final    Basophil % 05/29/2025 0.6  % Final    Imm Grans % 05/29/2025 1.1  % Final    Neut #  05/29/2025 3.02  2.1 - 9.2 x10(3)/mcL Final    Lymph # 05/29/2025 1.85  0.6 - 4.6 x10(3)/mcL Final    Mono # 05/29/2025 0.27  0.1 - 1.3 x10(3)/mcL Final    Eos # 05/29/2025 0.02  0 - 0.9 x10(3)/mcL Final    Baso # 05/29/2025 0.03  <=0.2 x10(3)/mcL Final    Imm Gran # 05/29/2025 0.06 (H)  0.00 - 0.04 x10(3)/mcL Final    NRBC% 05/29/2025 0.0  % Final        Pathology:  -core biopsy 01/27/2025:  IDC, overall grade 3, triple negative  -ER negative (0%), ID negative (0%), HER2 negative (score 0), Ki-67 high proliferation (65%)  -radiologically, per mammogram and ultrasound 01/27/2025:  23 x 17 x 22 mm mass; no lymphadenopathy  -left breast tumor 23 mm per mammogram and ultrasound, 2.9 cm per MRI, triple negative; G3  -cT2 cN0 MX, AJCC anatomic stage at least IIA, clinical prognostic stage at least IIB      Radiology/Diagnostics:  -screening mammogram 12/13/2023: BI-RADS 0  -diagnostic mammogram and ultrasound 01/27/2025:  BI-RADS: 5  03/05/2025:  Pre chemotherapy TTE: LVEF 55-60%  -03/06/2025: Staging CTs C/A/P with contrast:  Lobulated left breast nodule consistent with patient's known primary breast malignancy with no evidence of metastatic disease on this exam.  -03/06/2025:  Staging whole-body nuclear medicine bone scan: No bone metastases  -03/13/2025: Bilateral breast MRI with and without contrast: Left breast lesion #1, 2.4 x 2.2 x 2.9 cm, biopsy-proven malignancy, BI-RADS 6; no suspicious left axillary or internal mammary adenopathy; no evidence of malignancy in right breast    I have reviewed all available lab results and radiology reports.  Assessment:   IDC of left breast  Proceed with C3 D1 Keytruda/Taxol/carboplatin today  Re-stage with bilateral breast MRI with and without contrast after completion of 4 cycles of neoadjuvant chemotherapy  C3 D8 6/05/2025 Taxol with CBC CMP MG  C3 D15 6/12/2025 Taxol with CBC CMP MG  C4 D1 6/19/2025 labs/NP/keytruda/taxol/carbo  C4 D8 6/26/2025 labs/taxol  C4 D15 7/3/2025  labs/taxol  CBC CMP MG prior to each treatment   2. GERD   Continue Protonix 20 mg daily for GERD  3. Chemotherapy-induced peripheral neuropathy  Start Cymbalta 30 mg nightly x 1 week, then increase to 60 mg nightly for peripheral neuropathy  Problem List Items Addressed This Visit       Triple negative breast cancer    Invasive ductal carcinoma of left breast - Primary    Postmenopausal    Immunodeficiency due to chemotherapy    Other fatigue    Chemotherapy-induced peripheral neuropathy             Plan:   05/29/2025  Labs and exam stable  Proceed with C3 D1 Keytruda/Taxol/carbo today  Start Cymbalta 30 mg nightly x 1 week, then increase to 60 mg nightly for peripheral neuropathy  Continue Protonix 20 mg daily for GERD  Continue imodium as needed for diarrhea  Continue Ondansetron as needed for nausea  Re-stage with bilateral breast MRI with and without contrast after completion of 4 cycles of neoadjuvant chemotherapy, mid July -7/7/2025  C3 D8 6/05/2025 Taxol with CBC CMP MG  C3 D15 6/12/2025 Taxol with CBC CMP MG  C4 D1 6/19/2025 labs/NP/keytruda/taxol/carbo-cbc cmp mg tsh free t4   C4 D8 6/26/2025 labs/taxol  C4 D15 7/3/2025 labs/taxol  CBC CMP MG prior to each treatment   Labs and MD visit on 7/10/2025    Providers:         Orders for 04/03/2025:   Genetic testing  Start chemotherapy ASAP  Pharmacy are in the process of making chemotherapy plan  Chemo teaching with nursing staff ASAP  Follow-up with NP within a week  Re-stage with bilateral breast MRI with and without contrast after completion of 4 cycles of neoadjuvant chemotherapy      Above discussed with the patient.  All questions answered.    Discussed labs and scans and gave her copies of relevant results.  She will have a formal chemotherapy teaching class with our nursing staff.  She understands and agrees with this plan.  ====================================     #IDC left breast, triple negative:  -screening mammogram 12/13/2023: BI-RADS  0  -diagnostic mammogram and ultrasound 01/27/2025:  BI-RADS: 5  -core biopsy 01/27/2025:  IDC, overall grade 3, triple negative  -ER negative (0%), MO negative (0%), HER2 negative (score 0), Ki-67 high proliferation (65%)  -radiologically, per mammogram and ultrasound 01/27/2025:  23 x 17 x 22 mm mass; no lymphadenopathy  -02/24/2025: Initial consultation: ECOG 0  -pre chemotherapy TTE 03/05/2025:  LVEF 55-60%  -staging CTs C/A/P and bone scan 03/06/2025: No distant metastases  -breast MRI 03/13/2025:  Left breast lesion 2.4 x 2.2 x 2.9 cm  -left IJ MediPort placed 03/24/2025  -left breast tumor 23 mm per mammogram and ultrasound, 2.9 cm per MRI, triple negative; G3  -cT2 cN0 MX, AJCC anatomic stage at least IIA, clinical prognostic stage at least IIB  >>>  Plan:  Postmenopausal  Triple negative breast cancers; ordered genetic testing and counseling 02/24/2025  Also, family history of ovarian cancer in sister; needs genetic testing  By definition, operable breast cancer  For TNBC, cT2, preoperative systemic therapy will be preferred  In any case, long-term outcomes are the same when patients are given chemotherapy preoperatively compared with postoperatively  We will restage with breast MRI after 4 cycles of chemotherapy, then, after completion of 8 cycles of chemotherapy     Clinical prognostic stage at least IIB, therefore, preoperative followed by adjuvant:    Preoperative pembrolizumab/carboplatin/Taxol, followed by   preoperative pembrolizumab/Cyclophosphamide/doxorubicin or epirubicin, followed by   adjuvant pembrolizumab (category 1 recommendation)  (see below)  -If residual disease after preoperative therapy with taxane/alkylator/anthracycline based chemotherapy, then, capecitabine (patients in the [facility] trial did not receive capecitabine, therefore, there are no data on sequencing or to guide selection of one agent over the other, i.e., capecitabine versus olaparib if BRCA1/2 mutation positive)  -If  germline BRCA1/2 mutation positive, then, adjuvant olaparib if residual disease after neoadjuvant chemotherapy  (patients in the [facility] trial did not receive capecitabine, therefore, there are no data on sequencing or to guide selection of one agent over the other)     Preoperative systemic therapy  Pembrolizumab 200 mg IV day 1  Paclitaxel 80 mg/m² IV days 1, 8, 15  Carboplatin AUC 5 IV day 1  (Cycle every 21 days x 4 cycles) (cycles 1-4)  >>>  Followed by:  -Pembrolizumab 200 mg IV day 1  -Doxorubicin 60 mg/m² IV day 1  -Cyclophosphamide 600 mg/m² IV day 1  (Cycle every 21 days for 4 cycles (cycles 5-8)  Followed by:  Adjuvant:  Pembrolizumab 200 mg IV day 1  Cycle every 21 days x 9 cycles     Response assessment to preoperative systemic therapy:  Physical examination, mammogram, and/or breast ultrasound and/or breast MRI  MRI is more accurate than mammography for assessing tumor response to preoperative therapy     After preoperative systemic therapy, if BCS and axillary staging, then:  Adjuvant systemic therapy +whole breast radiotherapy     After preoperative systemic therapy, if mastectomy and axillary staging, then:  1. Adjuvant systemic therapy +postmastectomy radiotherapy  -if any ypN+: PMRT to chest wall +comprehensive RNI with inclusion of any portion of the undetected axilla at risk  -if cN0, ypN0, then:  If axilla was assessed by SLNB or axillary dissection, then, no PMRT     Adjuvant systemic therapy after preoperative systemic therapy:  -consider adjuvant bisphosphonate therapy for risk reduction of distant metastases for 3-5 years in postmenopausal patients with a high-risk node-negative or node positive tumors; we will consider in this patient's; she will need dental evaluation and preventive Dentistry prior, in order to prevent/minimize likelihood of osteonecrosis of the jaw  1. If ypT0N0 or PCR, then:  Adjuvant pembrolizumab (if pembrolizumab containing regimen was given preoperatively)    2. If  ypT1-4, N0 or ypN=/>1, then:  -adjuvant pembrolizumab; and/or  -adjuvant capecitabine (6-8 cycles); and/or  -adjuvant olaparib x1 year if germline BRCA1/2 mutation positive (category 1 recommendation)     #Family history of lung cancer and ovarian cancer:  Father experienced lung cancer at age 84 (was a smoker); sister experienced ovarian cancer at age 54; maternal aunt experienced unknown kind of cancer in her 60s  >>>  -have ordered genetic testing and counseling     #Obesity:  -12/12/2024: BMI 37.2  -02/13/2025: BMI 39.4     History of dyslipidemia, bilateral tinnitus, sensorineural hearing loss, psoriatic arthritis              I have explained all of the above in detail and the patient understands all of the current recommendation(s). I have answered all of their questions to the best of my ability and to their complete satisfaction.       Arlene Noonan, FNP-C  Oncology/Hematology   [facility]               [1]   Social History  Tobacco Use    Smoking status: Never     Passive exposure: Never    Smokeless tobacco: Never   Substance Use Topics    Alcohol use: Not Currently     Comment: I ONLY WINE MAYBE TWICE year    Drug use: Never

## 2025-05-29 ENCOUNTER — OFFICE VISIT (OUTPATIENT)
Dept: HEMATOLOGY/ONCOLOGY | Facility: CLINIC | Age: 65
End: 2025-05-29
Payer: MEDICARE

## 2025-05-29 ENCOUNTER — RESULTS FOLLOW-UP (OUTPATIENT)
Dept: INTERNAL MEDICINE | Facility: CLINIC | Age: 65
End: 2025-05-29

## 2025-05-29 ENCOUNTER — INFUSION (OUTPATIENT)
Dept: INFUSION THERAPY | Facility: HOSPITAL | Age: 65
End: 2025-05-29
Attending: INTERNAL MEDICINE
Payer: MEDICARE

## 2025-05-29 VITALS — SYSTOLIC BLOOD PRESSURE: 125 MMHG | HEART RATE: 72 BPM | DIASTOLIC BLOOD PRESSURE: 82 MMHG

## 2025-05-29 VITALS
SYSTOLIC BLOOD PRESSURE: 139 MMHG | OXYGEN SATURATION: 98 % | RESPIRATION RATE: 18 BRPM | BODY MASS INDEX: 41.77 KG/M2 | HEART RATE: 77 BPM | HEIGHT: 62 IN | DIASTOLIC BLOOD PRESSURE: 81 MMHG | WEIGHT: 227 LBS | TEMPERATURE: 98 F

## 2025-05-29 DIAGNOSIS — C50.912 INVASIVE DUCTAL CARCINOMA OF BREAST, FEMALE, LEFT: ICD-10-CM

## 2025-05-29 DIAGNOSIS — Z17.421 TRIPLE NEGATIVE BREAST CANCER: ICD-10-CM

## 2025-05-29 DIAGNOSIS — T45.1X5A CHEMOTHERAPY-INDUCED PERIPHERAL NEUROPATHY: ICD-10-CM

## 2025-05-29 DIAGNOSIS — R53.83 OTHER FATIGUE: ICD-10-CM

## 2025-05-29 DIAGNOSIS — C50.912 INVASIVE DUCTAL CARCINOMA OF LEFT BREAST: Primary | ICD-10-CM

## 2025-05-29 DIAGNOSIS — C50.919 TRIPLE NEGATIVE BREAST CANCER: ICD-10-CM

## 2025-05-29 DIAGNOSIS — D84.821 IMMUNODEFICIENCY DUE TO CHEMOTHERAPY: ICD-10-CM

## 2025-05-29 DIAGNOSIS — G62.0 CHEMOTHERAPY-INDUCED PERIPHERAL NEUROPATHY: ICD-10-CM

## 2025-05-29 DIAGNOSIS — Z79.69 IMMUNODEFICIENCY DUE TO CHEMOTHERAPY: ICD-10-CM

## 2025-05-29 DIAGNOSIS — T45.1X5A IMMUNODEFICIENCY DUE TO CHEMOTHERAPY: ICD-10-CM

## 2025-05-29 DIAGNOSIS — Z78.0 POSTMENOPAUSAL: ICD-10-CM

## 2025-05-29 PROCEDURE — A4216 STERILE WATER/SALINE, 10 ML: HCPCS

## 2025-05-29 PROCEDURE — 96375 TX/PRO/DX INJ NEW DRUG ADDON: CPT

## 2025-05-29 PROCEDURE — 96413 CHEMO IV INFUSION 1 HR: CPT

## 2025-05-29 PROCEDURE — 63600175 PHARM REV CODE 636 W HCPCS

## 2025-05-29 PROCEDURE — 25000003 PHARM REV CODE 250

## 2025-05-29 PROCEDURE — 96367 TX/PROPH/DG ADDL SEQ IV INF: CPT

## 2025-05-29 PROCEDURE — 99214 OFFICE O/P EST MOD 30 MIN: CPT | Mod: PBBFAC

## 2025-05-29 PROCEDURE — 96417 CHEMO IV INFUS EACH ADDL SEQ: CPT

## 2025-05-29 RX ORDER — DIPHENHYDRAMINE HYDROCHLORIDE 50 MG/ML
50 INJECTION, SOLUTION INTRAMUSCULAR; INTRAVENOUS ONCE AS NEEDED
Status: DISCONTINUED | OUTPATIENT
Start: 2025-05-29 | End: 2025-05-29 | Stop reason: HOSPADM

## 2025-05-29 RX ORDER — FAMOTIDINE 10 MG/ML
20 INJECTION, SOLUTION INTRAVENOUS
Status: CANCELLED | OUTPATIENT
Start: 2025-05-29

## 2025-05-29 RX ORDER — HEPARIN 100 UNIT/ML
500 SYRINGE INTRAVENOUS
Status: CANCELLED | OUTPATIENT
Start: 2025-05-29

## 2025-05-29 RX ORDER — HEPARIN 100 UNIT/ML
500 SYRINGE INTRAVENOUS
Status: DISCONTINUED | OUTPATIENT
Start: 2025-05-29 | End: 2025-05-29 | Stop reason: HOSPADM

## 2025-05-29 RX ORDER — EPINEPHRINE 1 MG/ML
0.3 INJECTION INTRAMUSCULAR; INTRAVENOUS; SUBCUTANEOUS ONCE AS NEEDED
Status: DISCONTINUED | OUTPATIENT
Start: 2025-05-29 | End: 2025-05-29 | Stop reason: HOSPADM

## 2025-05-29 RX ORDER — EPINEPHRINE 0.3 MG/.3ML
0.3 INJECTION SUBCUTANEOUS ONCE AS NEEDED
Status: CANCELLED | OUTPATIENT
Start: 2025-05-29

## 2025-05-29 RX ORDER — FAMOTIDINE 10 MG/ML
20 INJECTION, SOLUTION INTRAVENOUS
Status: COMPLETED | OUTPATIENT
Start: 2025-05-29 | End: 2025-05-29

## 2025-05-29 RX ORDER — DULOXETIN HYDROCHLORIDE 30 MG/1
CAPSULE, DELAYED RELEASE ORAL
Qty: 53 CAPSULE | Refills: 0 | Status: SHIPPED | OUTPATIENT
Start: 2025-05-29 | End: 2025-06-28

## 2025-05-29 RX ORDER — SODIUM CHLORIDE 0.9 % (FLUSH) 0.9 %
10 SYRINGE (ML) INJECTION
Status: CANCELLED | OUTPATIENT
Start: 2025-05-29

## 2025-05-29 RX ORDER — SODIUM CHLORIDE 0.9 % (FLUSH) 0.9 %
10 SYRINGE (ML) INJECTION
Status: DISCONTINUED | OUTPATIENT
Start: 2025-05-29 | End: 2025-05-29 | Stop reason: HOSPADM

## 2025-05-29 RX ORDER — DIPHENHYDRAMINE HYDROCHLORIDE 50 MG/ML
50 INJECTION, SOLUTION INTRAMUSCULAR; INTRAVENOUS
Status: CANCELLED
Start: 2025-05-29

## 2025-05-29 RX ORDER — DIPHENHYDRAMINE HYDROCHLORIDE 50 MG/ML
50 INJECTION, SOLUTION INTRAMUSCULAR; INTRAVENOUS ONCE AS NEEDED
Status: CANCELLED | OUTPATIENT
Start: 2025-05-29

## 2025-05-29 RX ORDER — DIPHENHYDRAMINE HYDROCHLORIDE 50 MG/ML
50 INJECTION, SOLUTION INTRAMUSCULAR; INTRAVENOUS
Status: COMPLETED | OUTPATIENT
Start: 2025-05-29 | End: 2025-05-29

## 2025-05-29 RX ADMIN — DIPHENHYDRAMINE HYDROCHLORIDE 50 MG: 50 INJECTION INTRAMUSCULAR; INTRAVENOUS at 09:05

## 2025-05-29 RX ADMIN — PACLITAXEL 168 MG: 6 INJECTION, SOLUTION INTRAVENOUS at 10:05

## 2025-05-29 RX ADMIN — APREPITANT 130 MG: 130 INJECTION, EMULSION INTRAVENOUS at 09:05

## 2025-05-29 RX ADMIN — SODIUM CHLORIDE, PRESERVATIVE FREE 10 ML: 5 INJECTION INTRAVENOUS at 12:05

## 2025-05-29 RX ADMIN — CARBOPLATIN 715 MG: 10 INJECTION, SOLUTION INTRAVENOUS at 12:05

## 2025-05-29 RX ADMIN — DEXAMETHASONE SODIUM PHOSPHATE 0.25 MG: 4 INJECTION, SOLUTION INTRA-ARTICULAR; INTRALESIONAL; INTRAMUSCULAR; INTRAVENOUS; SOFT TISSUE at 09:05

## 2025-05-29 RX ADMIN — HEPARIN 500 UNITS: 100 SYRINGE at 12:05

## 2025-05-29 RX ADMIN — FAMOTIDINE 20 MG: 10 INJECTION, SOLUTION INTRAVENOUS at 09:05

## 2025-05-29 RX ADMIN — SODIUM CHLORIDE: 9 INJECTION, SOLUTION INTRAVENOUS at 09:05

## 2025-05-29 RX ADMIN — SODIUM CHLORIDE 200 MG: 9 INJECTION, SOLUTION INTRAVENOUS at 10:05

## 2025-05-30 ENCOUNTER — OFFICE VISIT (OUTPATIENT)
Dept: OPHTHALMOLOGY | Facility: CLINIC | Age: 65
End: 2025-05-30
Payer: MEDICARE

## 2025-05-30 VITALS — HEIGHT: 62 IN | WEIGHT: 220 LBS | BODY MASS INDEX: 40.48 KG/M2

## 2025-05-30 DIAGNOSIS — H52.4 MYOPIA OF BOTH EYES WITH ASTIGMATISM AND PRESBYOPIA: Primary | ICD-10-CM

## 2025-05-30 DIAGNOSIS — H11.002 PTERYGIUM, LEFT: ICD-10-CM

## 2025-05-30 DIAGNOSIS — H25.13 AGE-RELATED NUCLEAR CATARACT OF BOTH EYES: ICD-10-CM

## 2025-05-30 DIAGNOSIS — Q14.1 CONGENITAL HYPERTROPHY OF RETINAL PIGMENT EPITHELIUM OF LEFT EYE: ICD-10-CM

## 2025-05-30 DIAGNOSIS — H53.9 VISUAL CHANGES: ICD-10-CM

## 2025-05-30 DIAGNOSIS — H52.13 MYOPIA OF BOTH EYES WITH ASTIGMATISM AND PRESBYOPIA: Primary | ICD-10-CM

## 2025-05-30 DIAGNOSIS — H52.203 MYOPIA OF BOTH EYES WITH ASTIGMATISM AND PRESBYOPIA: Primary | ICD-10-CM

## 2025-05-30 DIAGNOSIS — Q07.9 CONGENITAL TILTED OPTIC NERVE: ICD-10-CM

## 2025-05-30 PROCEDURE — 99213 OFFICE O/P EST LOW 20 MIN: CPT | Mod: PBBFAC,PN

## 2025-05-30 RX ORDER — OLANZAPINE 5 MG/1
5 TABLET, FILM COATED ORAL DAILY
COMMUNITY
Start: 2025-04-16

## 2025-05-30 RX ORDER — CHOLECALCIFEROL (VITAMIN D3) 25 MCG
1000 TABLET ORAL 2 TIMES DAILY
COMMUNITY

## 2025-05-30 RX ORDER — MULTIVITAMIN
1 TABLET ORAL 3 TIMES DAILY
COMMUNITY

## 2025-06-02 PROBLEM — Q14.1 CONGENITAL HYPERTROPHY OF RETINAL PIGMENT EPITHELIUM OF LEFT EYE: Status: ACTIVE | Noted: 2025-06-02

## 2025-06-02 PROBLEM — Q07.9 CONGENITAL TILTED OPTIC NERVE: Status: ACTIVE | Noted: 2025-06-02

## 2025-06-05 ENCOUNTER — LAB VISIT (OUTPATIENT)
Dept: HEMATOLOGY/ONCOLOGY | Facility: CLINIC | Age: 65
End: 2025-06-05
Payer: MEDICARE

## 2025-06-05 ENCOUNTER — INFUSION (OUTPATIENT)
Dept: INFUSION THERAPY | Facility: HOSPITAL | Age: 65
End: 2025-06-05
Attending: INTERNAL MEDICINE
Payer: MEDICARE

## 2025-06-05 VITALS
DIASTOLIC BLOOD PRESSURE: 74 MMHG | OXYGEN SATURATION: 98 % | TEMPERATURE: 98 F | RESPIRATION RATE: 20 BRPM | BODY MASS INDEX: 40.55 KG/M2 | HEART RATE: 87 BPM | WEIGHT: 220.38 LBS | SYSTOLIC BLOOD PRESSURE: 139 MMHG | HEIGHT: 62 IN

## 2025-06-05 DIAGNOSIS — D84.821 IMMUNODEFICIENCY DUE TO CHEMOTHERAPY: ICD-10-CM

## 2025-06-05 DIAGNOSIS — Z78.0 POSTMENOPAUSAL: ICD-10-CM

## 2025-06-05 DIAGNOSIS — Z17.421 TRIPLE NEGATIVE BREAST CANCER: ICD-10-CM

## 2025-06-05 DIAGNOSIS — C50.912 INVASIVE DUCTAL CARCINOMA OF LEFT BREAST: Primary | ICD-10-CM

## 2025-06-05 DIAGNOSIS — R53.83 OTHER FATIGUE: ICD-10-CM

## 2025-06-05 DIAGNOSIS — Z79.69 IMMUNODEFICIENCY DUE TO CHEMOTHERAPY: ICD-10-CM

## 2025-06-05 DIAGNOSIS — T45.1X5A IMMUNODEFICIENCY DUE TO CHEMOTHERAPY: ICD-10-CM

## 2025-06-05 DIAGNOSIS — C50.912 INVASIVE DUCTAL CARCINOMA OF LEFT BREAST: ICD-10-CM

## 2025-06-05 DIAGNOSIS — C50.919 TRIPLE NEGATIVE BREAST CANCER: ICD-10-CM

## 2025-06-05 LAB
ALBUMIN SERPL-MCNC: 3.2 G/DL (ref 3.4–4.8)
ALBUMIN/GLOB SERPL: 0.9 RATIO (ref 1.1–2)
ALP SERPL-CCNC: 104 UNIT/L (ref 40–150)
ALT SERPL-CCNC: 23 UNIT/L (ref 0–55)
ANION GAP SERPL CALC-SCNC: 7 MEQ/L
AST SERPL-CCNC: 17 UNIT/L (ref 11–45)
BASOPHILS # BLD AUTO: 0.01 X10(3)/MCL
BASOPHILS NFR BLD AUTO: 0.2 %
BILIRUB SERPL-MCNC: 0.4 MG/DL
BUN SERPL-MCNC: 20.7 MG/DL (ref 9.8–20.1)
CALCIUM SERPL-MCNC: 9.4 MG/DL (ref 8.4–10.2)
CHLORIDE SERPL-SCNC: 105 MMOL/L (ref 98–107)
CO2 SERPL-SCNC: 27 MMOL/L (ref 23–31)
CREAT SERPL-MCNC: 0.86 MG/DL (ref 0.55–1.02)
CREAT/UREA NIT SERPL: 24
EOSINOPHIL # BLD AUTO: 0.03 X10(3)/MCL (ref 0–0.9)
EOSINOPHIL NFR BLD AUTO: 0.7 %
ERYTHROCYTE [DISTWIDTH] IN BLOOD BY AUTOMATED COUNT: 14.2 % (ref 11.5–17)
GFR SERPLBLD CREATININE-BSD FMLA CKD-EPI: >60 ML/MIN/1.73/M2
GLOBULIN SER-MCNC: 3.7 GM/DL (ref 2.4–3.5)
GLUCOSE SERPL-MCNC: 108 MG/DL (ref 82–115)
HCT VFR BLD AUTO: 33.8 % (ref 37–47)
HGB BLD-MCNC: 11 G/DL (ref 12–16)
IMM GRANULOCYTES # BLD AUTO: 0.03 X10(3)/MCL (ref 0–0.04)
IMM GRANULOCYTES NFR BLD AUTO: 0.7 %
LYMPHOCYTES # BLD AUTO: 1.75 X10(3)/MCL (ref 0.6–4.6)
LYMPHOCYTES NFR BLD AUTO: 42 %
MAGNESIUM SERPL-MCNC: 1.7 MG/DL (ref 1.6–2.6)
MCH RBC QN AUTO: 31.1 PG (ref 27–31)
MCHC RBC AUTO-ENTMCNC: 32.5 G/DL (ref 33–36)
MCV RBC AUTO: 95.5 FL (ref 80–94)
MONOCYTES # BLD AUTO: 0.21 X10(3)/MCL (ref 0.1–1.3)
MONOCYTES NFR BLD AUTO: 5 %
NEUTROPHILS # BLD AUTO: 2.14 X10(3)/MCL (ref 2.1–9.2)
NEUTROPHILS NFR BLD AUTO: 51.4 %
NRBC BLD AUTO-RTO: 0 %
PLATELET # BLD AUTO: 204 X10(3)/MCL (ref 130–400)
PMV BLD AUTO: 10.4 FL (ref 7.4–10.4)
POTASSIUM SERPL-SCNC: 4.3 MMOL/L (ref 3.5–5.1)
PROT SERPL-MCNC: 6.9 GM/DL (ref 5.8–7.6)
RBC # BLD AUTO: 3.54 X10(6)/MCL (ref 4.2–5.4)
SODIUM SERPL-SCNC: 139 MMOL/L (ref 136–145)
WBC # BLD AUTO: 4.17 X10(3)/MCL (ref 4.5–11.5)

## 2025-06-05 PROCEDURE — 83735 ASSAY OF MAGNESIUM: CPT

## 2025-06-05 PROCEDURE — 63600175 PHARM REV CODE 636 W HCPCS

## 2025-06-05 PROCEDURE — 96375 TX/PRO/DX INJ NEW DRUG ADDON: CPT

## 2025-06-05 PROCEDURE — A4216 STERILE WATER/SALINE, 10 ML: HCPCS

## 2025-06-05 PROCEDURE — 85025 COMPLETE CBC W/AUTO DIFF WBC: CPT

## 2025-06-05 PROCEDURE — 80053 COMPREHEN METABOLIC PANEL: CPT

## 2025-06-05 PROCEDURE — 25000003 PHARM REV CODE 250

## 2025-06-05 PROCEDURE — 96413 CHEMO IV INFUSION 1 HR: CPT

## 2025-06-05 RX ORDER — EPINEPHRINE 0.3 MG/.3ML
0.3 INJECTION SUBCUTANEOUS ONCE AS NEEDED
Status: CANCELLED | OUTPATIENT
Start: 2025-06-05

## 2025-06-05 RX ORDER — HEPARIN 100 UNIT/ML
500 SYRINGE INTRAVENOUS
Status: DISCONTINUED | OUTPATIENT
Start: 2025-06-05 | End: 2025-06-05 | Stop reason: HOSPADM

## 2025-06-05 RX ORDER — EPINEPHRINE 0.3 MG/.3ML
0.3 INJECTION SUBCUTANEOUS ONCE AS NEEDED
Status: DISCONTINUED | OUTPATIENT
Start: 2025-06-05 | End: 2025-06-05 | Stop reason: HOSPADM

## 2025-06-05 RX ORDER — FAMOTIDINE 10 MG/ML
20 INJECTION, SOLUTION INTRAVENOUS
Status: COMPLETED | OUTPATIENT
Start: 2025-06-05 | End: 2025-06-05

## 2025-06-05 RX ORDER — SODIUM CHLORIDE 0.9 % (FLUSH) 0.9 %
10 SYRINGE (ML) INJECTION
Status: CANCELLED | OUTPATIENT
Start: 2025-06-05

## 2025-06-05 RX ORDER — DEXAMETHASONE SODIUM PHOSPHATE 10 MG/ML
10 INJECTION INTRAMUSCULAR; INTRAVENOUS
Status: COMPLETED | OUTPATIENT
Start: 2025-06-05 | End: 2025-06-05

## 2025-06-05 RX ORDER — DEXAMETHASONE SODIUM PHOSPHATE 10 MG/ML
10 INJECTION INTRAMUSCULAR; INTRAVENOUS
Status: CANCELLED | OUTPATIENT
Start: 2025-06-05

## 2025-06-05 RX ORDER — DIPHENHYDRAMINE HYDROCHLORIDE 50 MG/ML
50 INJECTION, SOLUTION INTRAMUSCULAR; INTRAVENOUS
Status: CANCELLED
Start: 2025-06-05

## 2025-06-05 RX ORDER — HEPARIN 100 UNIT/ML
500 SYRINGE INTRAVENOUS
Status: CANCELLED | OUTPATIENT
Start: 2025-06-05

## 2025-06-05 RX ORDER — DIPHENHYDRAMINE HYDROCHLORIDE 50 MG/ML
50 INJECTION, SOLUTION INTRAMUSCULAR; INTRAVENOUS
Status: COMPLETED | OUTPATIENT
Start: 2025-06-05 | End: 2025-06-05

## 2025-06-05 RX ORDER — DIPHENHYDRAMINE HYDROCHLORIDE 50 MG/ML
50 INJECTION, SOLUTION INTRAMUSCULAR; INTRAVENOUS ONCE AS NEEDED
Status: CANCELLED | OUTPATIENT
Start: 2025-06-05

## 2025-06-05 RX ORDER — FAMOTIDINE 10 MG/ML
20 INJECTION, SOLUTION INTRAVENOUS
Status: CANCELLED | OUTPATIENT
Start: 2025-06-05

## 2025-06-05 RX ORDER — SODIUM CHLORIDE 0.9 % (FLUSH) 0.9 %
10 SYRINGE (ML) INJECTION
Status: DISCONTINUED | OUTPATIENT
Start: 2025-06-05 | End: 2025-06-05 | Stop reason: HOSPADM

## 2025-06-05 RX ORDER — DIPHENHYDRAMINE HYDROCHLORIDE 50 MG/ML
50 INJECTION, SOLUTION INTRAMUSCULAR; INTRAVENOUS ONCE AS NEEDED
Status: DISCONTINUED | OUTPATIENT
Start: 2025-06-05 | End: 2025-06-05 | Stop reason: HOSPADM

## 2025-06-05 RX ADMIN — DIPHENHYDRAMINE HYDROCHLORIDE 50 MG: 50 INJECTION INTRAMUSCULAR; INTRAVENOUS at 08:06

## 2025-06-05 RX ADMIN — FAMOTIDINE 20 MG: 10 INJECTION, SOLUTION INTRAVENOUS at 08:06

## 2025-06-05 RX ADMIN — HEPARIN 500 UNITS: 100 SYRINGE at 11:06

## 2025-06-05 RX ADMIN — Medication 10 ML: at 11:06

## 2025-06-05 RX ADMIN — PACLITAXEL 168 MG: 6 INJECTION, SOLUTION INTRAVENOUS at 09:06

## 2025-06-05 RX ADMIN — DEXAMETHASONE SODIUM PHOSPHATE 10 MG: 10 INJECTION INTRAMUSCULAR; INTRAVENOUS at 08:06

## 2025-06-12 ENCOUNTER — LAB VISIT (OUTPATIENT)
Dept: HEMATOLOGY/ONCOLOGY | Facility: CLINIC | Age: 65
End: 2025-06-12
Payer: MEDICARE

## 2025-06-12 ENCOUNTER — INFUSION (OUTPATIENT)
Dept: INFUSION THERAPY | Facility: HOSPITAL | Age: 65
End: 2025-06-12
Attending: INTERNAL MEDICINE
Payer: MEDICARE

## 2025-06-12 VITALS
SYSTOLIC BLOOD PRESSURE: 138 MMHG | WEIGHT: 220.81 LBS | OXYGEN SATURATION: 97 % | RESPIRATION RATE: 18 BRPM | HEART RATE: 79 BPM | DIASTOLIC BLOOD PRESSURE: 72 MMHG | BODY MASS INDEX: 40.63 KG/M2 | TEMPERATURE: 98 F | HEIGHT: 62 IN

## 2025-06-12 DIAGNOSIS — C50.912 INVASIVE DUCTAL CARCINOMA OF LEFT BREAST: ICD-10-CM

## 2025-06-12 DIAGNOSIS — T45.1X5A IMMUNODEFICIENCY DUE TO CHEMOTHERAPY: ICD-10-CM

## 2025-06-12 DIAGNOSIS — R53.83 OTHER FATIGUE: ICD-10-CM

## 2025-06-12 DIAGNOSIS — D84.821 IMMUNODEFICIENCY DUE TO CHEMOTHERAPY: ICD-10-CM

## 2025-06-12 DIAGNOSIS — C50.912 INVASIVE DUCTAL CARCINOMA OF LEFT BREAST: Primary | ICD-10-CM

## 2025-06-12 DIAGNOSIS — C50.919 TRIPLE NEGATIVE BREAST CANCER: ICD-10-CM

## 2025-06-12 DIAGNOSIS — Z79.69 IMMUNODEFICIENCY DUE TO CHEMOTHERAPY: ICD-10-CM

## 2025-06-12 DIAGNOSIS — Z78.0 POSTMENOPAUSAL: ICD-10-CM

## 2025-06-12 DIAGNOSIS — Z17.421 TRIPLE NEGATIVE BREAST CANCER: ICD-10-CM

## 2025-06-12 LAB
ALBUMIN SERPL-MCNC: 3 G/DL (ref 3.4–4.8)
ALBUMIN/GLOB SERPL: 0.8 RATIO (ref 1.1–2)
ALP SERPL-CCNC: 113 UNIT/L (ref 40–150)
ALT SERPL-CCNC: 23 UNIT/L (ref 0–55)
ANION GAP SERPL CALC-SCNC: 7 MEQ/L
AST SERPL-CCNC: 15 UNIT/L (ref 11–45)
BASOPHILS # BLD AUTO: 0.01 X10(3)/MCL
BASOPHILS NFR BLD AUTO: 0.2 %
BILIRUB SERPL-MCNC: 0.2 MG/DL
BUN SERPL-MCNC: 14.4 MG/DL (ref 9.8–20.1)
CALCIUM SERPL-MCNC: 8.8 MG/DL (ref 8.4–10.2)
CHLORIDE SERPL-SCNC: 107 MMOL/L (ref 98–107)
CO2 SERPL-SCNC: 25 MMOL/L (ref 23–31)
CREAT SERPL-MCNC: 0.76 MG/DL (ref 0.55–1.02)
CREAT/UREA NIT SERPL: 19
EOSINOPHIL # BLD AUTO: 0.01 X10(3)/MCL (ref 0–0.9)
EOSINOPHIL NFR BLD AUTO: 0.2 %
ERYTHROCYTE [DISTWIDTH] IN BLOOD BY AUTOMATED COUNT: 14.9 % (ref 11.5–17)
GFR SERPLBLD CREATININE-BSD FMLA CKD-EPI: >60 ML/MIN/1.73/M2
GLOBULIN SER-MCNC: 3.6 GM/DL (ref 2.4–3.5)
GLUCOSE SERPL-MCNC: 107 MG/DL (ref 82–115)
HCT VFR BLD AUTO: 30.9 % (ref 37–47)
HGB BLD-MCNC: 10.1 G/DL (ref 12–16)
IMM GRANULOCYTES # BLD AUTO: 0.07 X10(3)/MCL (ref 0–0.04)
IMM GRANULOCYTES NFR BLD AUTO: 1.6 %
LYMPHOCYTES # BLD AUTO: 1.46 X10(3)/MCL (ref 0.6–4.6)
LYMPHOCYTES NFR BLD AUTO: 34.4 %
MAGNESIUM SERPL-MCNC: 1.7 MG/DL (ref 1.6–2.6)
MCH RBC QN AUTO: 30.7 PG (ref 27–31)
MCHC RBC AUTO-ENTMCNC: 32.7 G/DL (ref 33–36)
MCV RBC AUTO: 93.9 FL (ref 80–94)
MONOCYTES # BLD AUTO: 0.36 X10(3)/MCL (ref 0.1–1.3)
MONOCYTES NFR BLD AUTO: 8.5 %
NEUTROPHILS # BLD AUTO: 2.34 X10(3)/MCL (ref 2.1–9.2)
NEUTROPHILS NFR BLD AUTO: 55.1 %
NRBC BLD AUTO-RTO: 0.5 %
PLATELET # BLD AUTO: 175 X10(3)/MCL (ref 130–400)
PMV BLD AUTO: 9.7 FL (ref 7.4–10.4)
POTASSIUM SERPL-SCNC: 4.1 MMOL/L (ref 3.5–5.1)
PROT SERPL-MCNC: 6.6 GM/DL (ref 5.8–7.6)
RBC # BLD AUTO: 3.29 X10(6)/MCL (ref 4.2–5.4)
SODIUM SERPL-SCNC: 139 MMOL/L (ref 136–145)
WBC # BLD AUTO: 4.25 X10(3)/MCL (ref 4.5–11.5)

## 2025-06-12 PROCEDURE — 25000003 PHARM REV CODE 250

## 2025-06-12 PROCEDURE — 96413 CHEMO IV INFUSION 1 HR: CPT

## 2025-06-12 PROCEDURE — 96375 TX/PRO/DX INJ NEW DRUG ADDON: CPT

## 2025-06-12 PROCEDURE — 63600175 PHARM REV CODE 636 W HCPCS

## 2025-06-12 PROCEDURE — 80053 COMPREHEN METABOLIC PANEL: CPT

## 2025-06-12 PROCEDURE — 85025 COMPLETE CBC W/AUTO DIFF WBC: CPT

## 2025-06-12 PROCEDURE — 83735 ASSAY OF MAGNESIUM: CPT

## 2025-06-12 RX ORDER — EPINEPHRINE 0.3 MG/.3ML
0.3 INJECTION SUBCUTANEOUS ONCE AS NEEDED
Status: CANCELLED | OUTPATIENT
Start: 2025-06-12

## 2025-06-12 RX ORDER — DIPHENHYDRAMINE HYDROCHLORIDE 50 MG/ML
50 INJECTION, SOLUTION INTRAMUSCULAR; INTRAVENOUS ONCE AS NEEDED
Status: CANCELLED | OUTPATIENT
Start: 2025-06-12

## 2025-06-12 RX ORDER — DEXAMETHASONE SODIUM PHOSPHATE 10 MG/ML
10 INJECTION INTRAMUSCULAR; INTRAVENOUS
Status: COMPLETED | OUTPATIENT
Start: 2025-06-12 | End: 2025-06-12

## 2025-06-12 RX ORDER — HEPARIN 100 UNIT/ML
500 SYRINGE INTRAVENOUS
Status: CANCELLED | OUTPATIENT
Start: 2025-06-12

## 2025-06-12 RX ORDER — DIPHENHYDRAMINE HYDROCHLORIDE 50 MG/ML
50 INJECTION, SOLUTION INTRAMUSCULAR; INTRAVENOUS
Status: COMPLETED | OUTPATIENT
Start: 2025-06-12 | End: 2025-06-12

## 2025-06-12 RX ORDER — HEPARIN 100 UNIT/ML
500 SYRINGE INTRAVENOUS
Status: DISCONTINUED | OUTPATIENT
Start: 2025-06-12 | End: 2025-06-12 | Stop reason: HOSPADM

## 2025-06-12 RX ORDER — DIPHENHYDRAMINE HYDROCHLORIDE 50 MG/ML
50 INJECTION, SOLUTION INTRAMUSCULAR; INTRAVENOUS
Status: CANCELLED
Start: 2025-06-12

## 2025-06-12 RX ORDER — EPINEPHRINE 1 MG/ML
0.3 INJECTION INTRAMUSCULAR; INTRAVENOUS; SUBCUTANEOUS ONCE AS NEEDED
Status: DISCONTINUED | OUTPATIENT
Start: 2025-06-12 | End: 2025-06-12 | Stop reason: HOSPADM

## 2025-06-12 RX ORDER — SODIUM CHLORIDE 0.9 % (FLUSH) 0.9 %
10 SYRINGE (ML) INJECTION
Status: DISCONTINUED | OUTPATIENT
Start: 2025-06-12 | End: 2025-06-12 | Stop reason: HOSPADM

## 2025-06-12 RX ORDER — SODIUM CHLORIDE 0.9 % (FLUSH) 0.9 %
10 SYRINGE (ML) INJECTION
Status: CANCELLED | OUTPATIENT
Start: 2025-06-12

## 2025-06-12 RX ORDER — FAMOTIDINE 10 MG/ML
20 INJECTION, SOLUTION INTRAVENOUS
Status: COMPLETED | OUTPATIENT
Start: 2025-06-12 | End: 2025-06-12

## 2025-06-12 RX ORDER — DEXAMETHASONE SODIUM PHOSPHATE 10 MG/ML
10 INJECTION INTRAMUSCULAR; INTRAVENOUS
Status: CANCELLED | OUTPATIENT
Start: 2025-06-12

## 2025-06-12 RX ORDER — FAMOTIDINE 10 MG/ML
20 INJECTION, SOLUTION INTRAVENOUS
Status: CANCELLED | OUTPATIENT
Start: 2025-06-12

## 2025-06-12 RX ORDER — DIPHENHYDRAMINE HYDROCHLORIDE 50 MG/ML
50 INJECTION, SOLUTION INTRAMUSCULAR; INTRAVENOUS ONCE AS NEEDED
Status: DISCONTINUED | OUTPATIENT
Start: 2025-06-12 | End: 2025-06-12 | Stop reason: HOSPADM

## 2025-06-12 RX ADMIN — HEPARIN 500 UNITS: 100 SYRINGE at 11:06

## 2025-06-12 RX ADMIN — PACLITAXEL 168 MG: 6 INJECTION, SOLUTION INTRAVENOUS at 10:06

## 2025-06-12 RX ADMIN — FAMOTIDINE 20 MG: 10 INJECTION, SOLUTION INTRAVENOUS at 10:06

## 2025-06-12 RX ADMIN — DEXAMETHASONE SODIUM PHOSPHATE 10 MG: 10 INJECTION INTRAMUSCULAR; INTRAVENOUS at 10:06

## 2025-06-12 RX ADMIN — SODIUM CHLORIDE: 9 INJECTION, SOLUTION INTRAVENOUS at 09:06

## 2025-06-12 RX ADMIN — DIPHENHYDRAMINE HYDROCHLORIDE 50 MG: 50 INJECTION INTRAMUSCULAR; INTRAVENOUS at 10:06

## 2025-06-12 NOTE — NURSING
Patient is here for labs & C3D15 Taxol. She c/o diarrhea but is taking imodium and it is effective. She is accompanied by her sister.     Taxol was given via [facility].

## 2025-06-18 ENCOUNTER — TELEPHONE (OUTPATIENT)
Dept: HEMATOLOGY/ONCOLOGY | Facility: CLINIC | Age: 65
End: 2025-06-18
Payer: MEDICARE

## 2025-06-18 NOTE — PROGRESS NOTES
[facility] Hematology/Oncology  PROGRESS NOTE    Subjective:       Patient ID: Alma Rosa Disla is a 65 y.o. female.    Diagnosis:   -IDC left breast, biopsy 01/27/2025, triple negative, overall grade 3; cT2 cN0 MX, AJCC anatomic stage at least IIA, clinical prognostic stage at least IIB  -postmenopausal  -father experienced lung cancer; sister experienced ovarian cancer  -obesity    Current Treatment:  Left IJ Mediport placed 03/24/2025    Keytruda/Carboplatin/Paclitaxel every 21 days x 4 cycles   Paclitaxel Day 1, 8, 15  Started on 04/17/2025    Keytruda held on C4 D1 6/19/2025 due to dermatologic toxicity    Cymbalta started on 5/29/2025 for peripheral neuropathy     Treatment History:    Chief Complaint: Follow-up (Patient reports nausea and diarrhea. Also stated she has numbness and tingling in both hands and feet. Patient also has a rash on both arms she's concerned about.)    HPI:  64-year-old lady, referred from [facility] breast Clinic, with invasive ductal carcinoma.     Investigations reviewed:  -12/13/2023:  Bilateral screening mammogram (comparison:  10/23/2013 mammogram):  BI-RADS category 0-incomplete  -01/27/2025:  bilateral diagnostic mammogram, limited ultrasound left breast:  HISTORY: 64-year-old female presents for further evaluation of a mass in the upper-outer quadrant of the left breast recalled from screening at [facility] on 12/13/2023.    Overall study BI-RADS: 5-highly suggestive of malignancy (per mammogram, up to 23 mm mass; per ultrasound, 23 x 17 x 22 mm mass)  -01/27/2025:  left breast mass 2 o'clock, 7 cm from nipple, ultrasound-guided needle core biopsy:  IDC, overall grade 3  -ER negative (0%), UT negative (0%), HER2 negative (score 0), Ki-67 high proliferation (65%)     02/24/2025:    Pleasant, healthy-appearing lady who presents for initial medical oncology consultation, accompanied by .  In no acute discomfort.    Overall, feels well and healthy.  Great appetite.  Never  noticed any breast lumps, breast pain, nipple discharge, skin or nipple changes.  Never noticed regional lymphadenopathy.    No anorexia, unintentional weight loss, weakness, fatigue, unusual headaches, focal neurological symptoms, chest pain, cough, dyspnea, abdominal pain, nausea, vomiting, GI bleeding, change in bowel habits, bone pains, any urinary problems, postmenopausal spotting, etc..  ECOG 0.    Past medical history: Dyslipidemia; postmenopausal; sensorineural hearing loss; obesity; psoriatic arthritis; JENNIFER positive; bilateral tinnitus  Procedure/surgical history: Adenoidectomy; cholecystectomy; colonoscopy; tonsillectomy    -2025:  Says that she underwent colonoscopy with [facility] 3 years ago, and that it showed 1 polyp; apparently, the recommended repeat colonoscopy in 5 years  Social history:  .  Lives in [place].  Has 1 child.  A 41-year-old son  from complications of diabetes mellitus.  She cleans houses.  She smoked <1 ppd x2 years; quit 40 years ago.  No alcohol or illicit drug abuse.  Family history: Father experienced lung cancer at age 84 (was a smoker); sister experienced ovarian cancer at age 54; maternal aunt experienced unknown kind of cancer in her 60s  Health maintenance:   -2023:  Thin prep cervical Pap smear:  NILM, high-risk HPV negative  -2025:  Says that she underwent colonoscopy with [facility] 3 years ago, and that it showed 1 polyp; apparently, the recommended repeat colonoscopy in 5 years  Menstrual/Ob gyn history: Menarche at age 13; LMP at age 60; no menopausal symptoms except for irritability x3-4 years; never took hormone replacement therapy; last OCP use at age 36 (used OCPs for 3 years); for pregnancies; 2 live births; 2 miscarriages;  her 2nd child for 6 months; 1st child at age 22    Interval History:  Patient presents to clinic for toxicity check and treatment clearance for cycle 4 day 1 of  Keytruda/Taxol/carbopaltin.  She does report occasional fatigue relieved with rest and metallic taste. She is reporting peripheral neuropathy to bilateral fingertips and toes, she did start Cymbalta on 5/29/2025. She is also reporting dyspnea with exertion. She continues with water aerobics twice weekly. She denies any chest pain, unusual headache, dizziness, vision changes, fever, diarrhea, or any new pain.  Labs and plan of care reviewed in detail with patient, verbalized understanding. She does have a rash to bilateral arms that started about three weeks ago.     PMX/PSHx  Past Medical History:   Diagnosis Date    Breast cancer     left    Hyperlipidemia     Menopause 4/27/20020      Past Surgical History:   Procedure Laterality Date    ADENOIDECTOMY      CHOLECYSTECTOMY  1998    COLONOSCOPY      with biopsy    INSERTION OF TUNNELED CENTRAL VENOUS CATHETER (CVC) WITH SUBCUTANEOUS PORT N/A 3/24/2025    Procedure: [id number];  Surgeon: Vik Lynch Jr., MD;  Location: [facility];  Service: General;  Laterality: N/A;  Likely right    OCCLUSION, FALLOPIAN TUBE, USING DEVICE, BY VAGINAL OR SUPRAPUBIC APPROACH Bilateral     TONSILLECTOMY  1975     Allergies:  Review of patient's allergies indicates:  No Known Allergies   Social History:   Social History[1]  Medications:  Current Outpatient Medications   Medication Instructions    dexAMETHasone (DECADRON) 8 mg, Oral, Daily, Take 2 tablets (8 mg) by mouth once daily on days 2,3,4 following chemotherapy.    DULoxetine (CYMBALTA) 30 MG capsule Take 1 capsule (30 mg total) by mouth once daily for 7 days, THEN 2 capsules (60 mg total) once daily for 23 days.    multivitamin (THERAGRAN) per tablet 1 tablet, 3 times daily    OLANZapine (ZYPREXA) 5 mg, Daily    omega-3 acid ethyl esters (LOVAZA) 2 g, 3 times daily    pantoprazole (PROTONIX) 20 mg, Oral, Daily    vitamin D (VITAMIN D3) 1,000 Units, 2 times daily     ROS:  Review of Systems   Constitutional:   Positive for fatigue. Negative for appetite change, chills, fever and unexpected weight change.   HENT:  Negative for facial swelling, mouth sores, nosebleeds, sinus pressure/congestion and sore throat.    Eyes:  Negative for photophobia, pain and visual disturbance.   Respiratory:  Positive for shortness of breath. Negative for cough, chest tightness and wheezing.    Cardiovascular:  Negative for chest pain, palpitations and leg swelling.   Gastrointestinal:  Negative for abdominal pain, blood in stool, change in bowel habit, constipation, diarrhea, nausea and vomiting.   Endocrine: Negative.    Genitourinary:  Negative for dysuria, frequency, hematuria, hot flashes, pelvic pain, urgency and vaginal pain.   Musculoskeletal:  Negative for arthralgias, back pain, gait problem, joint swelling and myalgias.   Integumentary:  Negative for pallor, rash, wound, breast mass, breast discharge and breast tenderness.   Allergic/Immunologic: Negative.  Negative for immunocompromised state.   Neurological:  Positive for numbness. Negative for dizziness, vertigo, syncope, weakness and headaches.   Hematological:  Negative for adenopathy. Does not bruise/bleed easily.   Psychiatric/Behavioral:  Negative for behavioral problems and dysphoric mood. The patient is not nervous/anxious.    All other systems reviewed and are negative.  Breast: Negative for mass and tenderness      Objective:   Vitals:  Vitals:    06/19/25 0820   BP: 109/70   Pulse: (!) 111   Resp: 17   Temp: 98.1 °F (36.7 °C)          Wt Readings from Last 3 Encounters:   06/19/25 0820 99.1 kg (218 lb 6.4 oz)   06/12/25 0920 100.2 kg (220 lb 12.8 oz)   06/05/25 0820 100 kg (220 lb 6.4 oz)      Physical Examination:  Physical Exam  Vitals and nursing note reviewed.   HENT:      Head: Normocephalic and atraumatic.      Mouth/Throat:      Mouth: Mucous membranes are moist.      Pharynx: Oropharynx is clear.   Eyes:      Extraocular Movements: Extraocular movements intact.       Conjunctiva/sclera: Conjunctivae normal.      Pupils: Pupils are equal, round, and reactive to light.   Cardiovascular:      Rate and Rhythm: Normal rate and regular rhythm.   Pulmonary:      Effort: Pulmonary effort is normal.      Breath sounds: Normal breath sounds.   Abdominal:      General: Abdomen is flat. Bowel sounds are normal.      Palpations: Abdomen is soft.   Musculoskeletal:         General: Normal range of motion.      Cervical back: Normal range of motion and neck supple.   Skin:     General: Skin is warm and dry.      Comments: Rash noted to bilateral arms    Neurological:      General: No focal deficit present.      Mental Status: She is alert.   Psychiatric:         Mood and Affect: Mood normal.       ECOG SCORE           Labs:  Lab Visit on 06/19/2025   Component Date Value Ref Range Status    Sodium 06/19/2025 140  136 - 145 mmol/L Final    Potassium 06/19/2025 4.0  3.5 - 5.1 mmol/L Final    Chloride 06/19/2025 107  98 - 107 mmol/L Final    CO2 06/19/2025 27  23 - 31 mmol/L Final    Glucose 06/19/2025 102  82 - 115 mg/dL Final    Blood Urea Nitrogen 06/19/2025 13.2  9.8 - 20.1 mg/dL Final    Creatinine 06/19/2025 0.78  0.55 - 1.02 mg/dL Final    Calcium 06/19/2025 9.1  8.4 - 10.2 mg/dL Final    Protein Total 06/19/2025 6.7  5.8 - 7.6 gm/dL Final    Albumin 06/19/2025 3.1 (L)  3.4 - 4.8 g/dL Final    Globulin 06/19/2025 3.6 (H)  2.4 - 3.5 gm/dL Final    Albumin/Globulin Ratio 06/19/2025 0.9 (L)  1.1 - 2.0 ratio Final    Bilirubin Total 06/19/2025 0.3  <=1.5 mg/dL Final    ALP 06/19/2025 119  40 - 150 unit/L Final    ALT 06/19/2025 21  0 - 55 unit/L Final    AST 06/19/2025 11  11 - 45 unit/L Final    eGFR 06/19/2025 >60  mL/min/1.73/m2 Final    Estimated GFR calculated using the CKD-EPI creatinine (2021) equation.    Anion Gap 06/19/2025 6.0  mEq/L Final    BUN/Creatinine Ratio 06/19/2025 17   Final    Magnesium Level 06/19/2025 1.70  1.60 - 2.60 mg/dL Final    Thyroxine Free 06/19/2025 1.03   0.70 - 1.48 ng/dL Final    TSH 06/19/2025 2.402  0.350 - 4.940 uIU/mL Final    WBC 06/19/2025 4.45 (L)  4.50 - 11.50 x10(3)/mcL Final    RBC 06/19/2025 3.25 (L)  4.20 - 5.40 x10(6)/mcL Final    Hgb 06/19/2025 10.0 (L)  12.0 - 16.0 g/dL Final    Hct 06/19/2025 30.5 (L)  37.0 - 47.0 % Final    MCV 06/19/2025 93.8  80.0 - 94.0 fL Final    MCH 06/19/2025 30.8  27.0 - 31.0 pg Final    MCHC 06/19/2025 32.8 (L)  33.0 - 36.0 g/dL Final    RDW 06/19/2025 15.3  11.5 - 17.0 % Final    Platelet 06/19/2025 197  130 - 400 x10(3)/mcL Final    MPV 06/19/2025 10.1  7.4 - 10.4 fL Final    Neut % 06/19/2025 54.3  % Final    Lymph % 06/19/2025 36.9  % Final    Mono % 06/19/2025 6.7  % Final    Eos % 06/19/2025 0.4  % Final    Basophil % 06/19/2025 0.4  % Final    Imm Grans % 06/19/2025 1.3  % Final    Neut # 06/19/2025 2.41  2.1 - 9.2 x10(3)/mcL Final    Lymph # 06/19/2025 1.64  0.6 - 4.6 x10(3)/mcL Final    Mono # 06/19/2025 0.30  0.1 - 1.3 x10(3)/mcL Final    Eos # 06/19/2025 0.02  0 - 0.9 x10(3)/mcL Final    Baso # 06/19/2025 0.02  <=0.2 x10(3)/mcL Final    Imm Gran # 06/19/2025 0.06 (H)  0.00 - 0.04 x10(3)/mcL Final    NRBC% 06/19/2025 1.1  % Final          Pathology:  -core biopsy 01/27/2025:  IDC, overall grade 3, triple negative  -ER negative (0%), ND negative (0%), HER2 negative (score 0), Ki-67 high proliferation (65%)  -radiologically, per mammogram and ultrasound 01/27/2025:  23 x 17 x 22 mm mass; no lymphadenopathy  -left breast tumor 23 mm per mammogram and ultrasound, 2.9 cm per MRI, triple negative; G3  -cT2 cN0 MX, AJCC anatomic stage at least IIA, clinical prognostic stage at least IIB      Radiology/Diagnostics:  -screening mammogram 12/13/2023: BI-RADS 0  -diagnostic mammogram and ultrasound 01/27/2025:  BI-RADS: 5  03/05/2025:  Pre chemotherapy TTE: LVEF 55-60%  -03/06/2025: Staging CTs C/A/P with contrast:  Lobulated left breast nodule consistent with patient's known primary breast malignancy with no evidence of  metastatic disease on this exam.  -03/06/2025:  Staging whole-body nuclear medicine bone scan: No bone metastases  -03/13/2025: Bilateral breast MRI with and without contrast: Left breast lesion #1, 2.4 x 2.2 x 2.9 cm, biopsy-proven malignancy, BI-RADS 6; no suspicious left axillary or internal mammary adenopathy; no evidence of malignancy in right breast    I have reviewed all available lab results and radiology reports.  Assessment:   IDC of left breast  Proceed with C4 D1/Taxol/carboplatin today  Hold Keytruda due to dermatologic toxicity to bilateral arms  Re-stage with bilateral breast MRI with and without contrast after completion of 4 cycles of neoadjuvant chemotherapy  C4 D1 6/19/2025 labs/NP/keytruda/taxol/carbo  C4 D8 6/26/2025 labs/taxol  C4 D15 7/3/2025 labs/taxol  CBC CMP MG prior to each treatment   2. GERD   Continue Protonix 20 mg daily for GERD  3. Chemotherapy-induced peripheral neuropathy  Continue Cymbalta 60 mg nightly   Problem List Items Addressed This Visit       Triple negative breast cancer    Invasive ductal carcinoma of left breast    Postmenopausal    Immunodeficiency due to chemotherapy    Other fatigue    Chemotherapy-induced peripheral neuropathy    Rash - Primary               Plan:   06/19/2025  Labs and exam stable  Proceed with C4 D1 Taxol/carbo today  Hold Keytruda due to rash  Continue Cymbalta 60 mg nightly for peripheral neuropathy, started on 5/29/2025  Start triamcinolone topical for rash  Continue Protonix 20 mg daily for GERD  Continue imodium as needed for diarrhea  Continue Ondansetron as needed for nausea  Re-stage with bilateral breast MRI with and without contrast after completion of 4 cycles of neoadjuvant chemotherapy, mid July -7/7/2025  C4 D1 6/19/2025 labs/NP/keytruda/taxol/carbo-cbc cmp mg tsh free t4   C4 D8 6/26/2025 labs/taxol  C4 D15 7/3/2025 labs/taxol  CBC CMP MG prior to each treatment   Labs and MD visit on 7/10/2025  TSH and free t4 every 6 weeks    Cycle 4 keytruda/adriamcyin/cytoxan   Neulasta on Day 2   Chemotherapy teaching for Adriamycin and Cytoxan prior to 7/10/2025    Providers:         Orders for 04/03/2025:   Genetic testing  Start chemotherapy ASAP  Pharmacy are in the process of making chemotherapy plan  Chemo teaching with nursing staff ASAP  Follow-up with NP within a week  Re-stage with bilateral breast MRI with and without contrast after completion of 4 cycles of neoadjuvant chemotherapy      Above discussed with the patient.  All questions answered.    Discussed labs and scans and gave her copies of relevant results.  She will have a formal chemotherapy teaching class with our nursing staff.  She understands and agrees with this plan.  ====================================     #IDC left breast, triple negative:  -screening mammogram 12/13/2023: BI-RADS 0  -diagnostic mammogram and ultrasound 01/27/2025:  BI-RADS: 5  -core biopsy 01/27/2025:  IDC, overall grade 3, triple negative  -ER negative (0%), NC negative (0%), HER2 negative (score 0), Ki-67 high proliferation (65%)  -radiologically, per mammogram and ultrasound 01/27/2025:  23 x 17 x 22 mm mass; no lymphadenopathy  -02/24/2025: Initial consultation: ECOG 0  -pre chemotherapy TTE 03/05/2025:  LVEF 55-60%  -staging CTs C/A/P and bone scan 03/06/2025: No distant metastases  -breast MRI 03/13/2025:  Left breast lesion 2.4 x 2.2 x 2.9 cm  -left IJ MediPort placed 03/24/2025  -left breast tumor 23 mm per mammogram and ultrasound, 2.9 cm per MRI, triple negative; G3  -cT2 cN0 MX, AJCC anatomic stage at least IIA, clinical prognostic stage at least IIB  >>>  Plan:  Postmenopausal  Triple negative breast cancers; ordered genetic testing and counseling 02/24/2025  Also, family history of ovarian cancer in sister; needs genetic testing  By definition, operable breast cancer  For TNBC, cT2, preoperative systemic therapy will be preferred  In any case, long-term outcomes are the same when patients  are given chemotherapy preoperatively compared with postoperatively  We will restage with breast MRI after 4 cycles of chemotherapy, then, after completion of 8 cycles of chemotherapy     Clinical prognostic stage at least IIB, therefore, preoperative followed by adjuvant:    Preoperative pembrolizumab/carboplatin/Taxol, followed by   preoperative pembrolizumab/Cyclophosphamide/doxorubicin or epirubicin, followed by   adjuvant pembrolizumab (category 1 recommendation)  (see below)  -If residual disease after preoperative therapy with taxane/alkylator/anthracycline based chemotherapy, then, capecitabine (patients in the Giacomo trial did not receive capecitabine, therefore, there are no data on sequencing or to guide selection of one agent over the other, i.e., capecitabine versus olaparib if BRCA1/2 mutation positive)  -If germline BRCA1/2 mutation positive, then, adjuvant olaparib if residual disease after neoadjuvant chemotherapy  (patients in the Giacomo trial did not receive capecitabine, therefore, there are no data on sequencing or to guide selection of one agent over the other)     Preoperative systemic therapy  Pembrolizumab 200 mg IV day 1  Paclitaxel 80 mg/m² IV days 1, 8, 15  Carboplatin AUC 5 IV day 1  (Cycle every 21 days x 4 cycles) (cycles 1-4)  >>>  Followed by:  -Pembrolizumab 200 mg IV day 1  -Doxorubicin 60 mg/m² IV day 1  -Cyclophosphamide 600 mg/m² IV day 1  (Cycle every 21 days for 4 cycles (cycles 5-8)  Followed by:  Adjuvant:  Pembrolizumab 200 mg IV day 1  Cycle every 21 days x 9 cycles     Response assessment to preoperative systemic therapy:  Physical examination, mammogram, and/or breast ultrasound and/or breast MRI  MRI is more accurate than mammography for assessing tumor response to preoperative therapy     After preoperative systemic therapy, if BCS and axillary staging, then:  Adjuvant systemic therapy +whole breast radiotherapy     After preoperative systemic therapy, if mastectomy  and axillary staging, then:  1. Adjuvant systemic therapy +postmastectomy radiotherapy  -if any ypN+: PMRT to chest wall +comprehensive RNI with inclusion of any portion of the undetected axilla at risk  -if cN0, ypN0, then:  If axilla was assessed by SLNB or axillary dissection, then, no PMRT     Adjuvant systemic therapy after preoperative systemic therapy:  -consider adjuvant bisphosphonate therapy for risk reduction of distant metastases for 3-5 years in postmenopausal patients with a high-risk node-negative or node positive tumors; we will consider in this patient's; she will need dental evaluation and preventive Dentistry prior, in order to prevent/minimize likelihood of osteonecrosis of the jaw  1. If ypT0N0 or PCR, then:  Adjuvant pembrolizumab (if pembrolizumab containing regimen was given preoperatively)    2. If ypT1-4, N0 or ypN=/>1, then:  -adjuvant pembrolizumab; and/or  -adjuvant capecitabine (6-8 cycles); and/or  -adjuvant olaparib x1 year if germline BRCA1/2 mutation positive (category 1 recommendation)     #Family history of lung cancer and ovarian cancer:  Father experienced lung cancer at age 84 (was a smoker); sister experienced ovarian cancer at age 54; maternal aunt experienced unknown kind of cancer in her 60s  >>>  -have ordered genetic testing and counseling     #Obesity:  -12/12/2024: BMI 37.2  -02/13/2025: BMI 39.4     History of dyslipidemia, bilateral tinnitus, sensorineural hearing loss, psoriatic arthritis              I have explained all of the above in detail and the patient understands all of the current recommendation(s). I have answered all of their questions to the best of my ability and to their complete satisfaction.       Arlene Noonan, FNP-C  Oncology/Hematology   [facility]                 [1]   Social History  Tobacco Use    Smoking status: Never     Passive exposure: Never    Smokeless tobacco: Never   Substance Use Topics    Alcohol use: Not  Currently     Comment: I ONLY WINE MAYBE TWICE year    Drug use: Never

## 2025-06-19 ENCOUNTER — OFFICE VISIT (OUTPATIENT)
Dept: HEMATOLOGY/ONCOLOGY | Facility: CLINIC | Age: 65
End: 2025-06-19
Payer: MEDICARE

## 2025-06-19 ENCOUNTER — INFUSION (OUTPATIENT)
Dept: INFUSION THERAPY | Facility: HOSPITAL | Age: 65
End: 2025-06-19
Attending: INTERNAL MEDICINE
Payer: MEDICARE

## 2025-06-19 VITALS
DIASTOLIC BLOOD PRESSURE: 83 MMHG | HEIGHT: 62 IN | SYSTOLIC BLOOD PRESSURE: 122 MMHG | OXYGEN SATURATION: 96 % | WEIGHT: 218.38 LBS | HEART RATE: 83 BPM | BODY MASS INDEX: 40.19 KG/M2 | TEMPERATURE: 98 F | RESPIRATION RATE: 18 BRPM

## 2025-06-19 VITALS
TEMPERATURE: 98 F | DIASTOLIC BLOOD PRESSURE: 70 MMHG | HEART RATE: 111 BPM | RESPIRATION RATE: 17 BRPM | WEIGHT: 218.38 LBS | OXYGEN SATURATION: 97 % | BODY MASS INDEX: 40.19 KG/M2 | SYSTOLIC BLOOD PRESSURE: 109 MMHG | HEIGHT: 62 IN

## 2025-06-19 DIAGNOSIS — C50.912 INVASIVE DUCTAL CARCINOMA OF LEFT BREAST: ICD-10-CM

## 2025-06-19 DIAGNOSIS — R21 RASH: Primary | ICD-10-CM

## 2025-06-19 DIAGNOSIS — G62.0 CHEMOTHERAPY-INDUCED PERIPHERAL NEUROPATHY: ICD-10-CM

## 2025-06-19 DIAGNOSIS — C50.919 TRIPLE NEGATIVE BREAST CANCER: ICD-10-CM

## 2025-06-19 DIAGNOSIS — T45.1X5A IMMUNODEFICIENCY DUE TO CHEMOTHERAPY: ICD-10-CM

## 2025-06-19 DIAGNOSIS — Z17.421 TRIPLE NEGATIVE BREAST CANCER: ICD-10-CM

## 2025-06-19 DIAGNOSIS — Z79.69 IMMUNODEFICIENCY DUE TO CHEMOTHERAPY: ICD-10-CM

## 2025-06-19 DIAGNOSIS — D84.821 IMMUNODEFICIENCY DUE TO CHEMOTHERAPY: ICD-10-CM

## 2025-06-19 DIAGNOSIS — C50.912 INVASIVE DUCTAL CARCINOMA OF LEFT BREAST: Primary | ICD-10-CM

## 2025-06-19 DIAGNOSIS — R53.83 OTHER FATIGUE: ICD-10-CM

## 2025-06-19 DIAGNOSIS — Z78.0 POSTMENOPAUSAL: ICD-10-CM

## 2025-06-19 DIAGNOSIS — T45.1X5A CHEMOTHERAPY-INDUCED PERIPHERAL NEUROPATHY: ICD-10-CM

## 2025-06-19 PROCEDURE — A4216 STERILE WATER/SALINE, 10 ML: HCPCS

## 2025-06-19 PROCEDURE — 96367 TX/PROPH/DG ADDL SEQ IV INF: CPT

## 2025-06-19 PROCEDURE — 96413 CHEMO IV INFUSION 1 HR: CPT

## 2025-06-19 PROCEDURE — 96375 TX/PRO/DX INJ NEW DRUG ADDON: CPT

## 2025-06-19 PROCEDURE — 25000003 PHARM REV CODE 250

## 2025-06-19 PROCEDURE — 96417 CHEMO IV INFUS EACH ADDL SEQ: CPT

## 2025-06-19 PROCEDURE — 99214 OFFICE O/P EST MOD 30 MIN: CPT | Mod: PBBFAC,25

## 2025-06-19 PROCEDURE — 63600175 PHARM REV CODE 636 W HCPCS

## 2025-06-19 RX ORDER — FAMOTIDINE 10 MG/ML
20 INJECTION, SOLUTION INTRAVENOUS
Status: COMPLETED | OUTPATIENT
Start: 2025-06-19 | End: 2025-06-19

## 2025-06-19 RX ORDER — HEPARIN 100 UNIT/ML
500 SYRINGE INTRAVENOUS
Status: DISCONTINUED | OUTPATIENT
Start: 2025-06-19 | End: 2025-06-19 | Stop reason: HOSPADM

## 2025-06-19 RX ORDER — DIPHENHYDRAMINE HYDROCHLORIDE 50 MG/ML
50 INJECTION, SOLUTION INTRAMUSCULAR; INTRAVENOUS
Status: CANCELLED
Start: 2025-06-19

## 2025-06-19 RX ORDER — SODIUM CHLORIDE 0.9 % (FLUSH) 0.9 %
10 SYRINGE (ML) INJECTION
Status: CANCELLED | OUTPATIENT
Start: 2025-06-19

## 2025-06-19 RX ORDER — EPINEPHRINE 0.3 MG/.3ML
0.3 INJECTION SUBCUTANEOUS ONCE AS NEEDED
Status: CANCELLED | OUTPATIENT
Start: 2025-06-19

## 2025-06-19 RX ORDER — DIPHENHYDRAMINE HYDROCHLORIDE 50 MG/ML
50 INJECTION, SOLUTION INTRAMUSCULAR; INTRAVENOUS ONCE AS NEEDED
Status: CANCELLED | OUTPATIENT
Start: 2025-06-19

## 2025-06-19 RX ORDER — EPINEPHRINE 1 MG/ML
0.3 INJECTION INTRAMUSCULAR; INTRAVENOUS; SUBCUTANEOUS ONCE AS NEEDED
Status: DISCONTINUED | OUTPATIENT
Start: 2025-06-19 | End: 2025-06-19 | Stop reason: HOSPADM

## 2025-06-19 RX ORDER — DIPHENHYDRAMINE HYDROCHLORIDE 50 MG/ML
50 INJECTION, SOLUTION INTRAMUSCULAR; INTRAVENOUS ONCE AS NEEDED
Status: DISCONTINUED | OUTPATIENT
Start: 2025-06-19 | End: 2025-06-19 | Stop reason: HOSPADM

## 2025-06-19 RX ORDER — DIPHENHYDRAMINE HYDROCHLORIDE 50 MG/ML
50 INJECTION, SOLUTION INTRAMUSCULAR; INTRAVENOUS
Status: COMPLETED | OUTPATIENT
Start: 2025-06-19 | End: 2025-06-19

## 2025-06-19 RX ORDER — SODIUM CHLORIDE 0.9 % (FLUSH) 0.9 %
10 SYRINGE (ML) INJECTION
Status: DISCONTINUED | OUTPATIENT
Start: 2025-06-19 | End: 2025-06-19 | Stop reason: HOSPADM

## 2025-06-19 RX ORDER — FAMOTIDINE 10 MG/ML
20 INJECTION, SOLUTION INTRAVENOUS
Status: CANCELLED | OUTPATIENT
Start: 2025-06-19

## 2025-06-19 RX ORDER — TRIAMCINOLONE ACETONIDE 1 MG/G
CREAM TOPICAL 3 TIMES DAILY
Qty: 80 G | Refills: 1 | Status: SHIPPED | OUTPATIENT
Start: 2025-06-19 | End: 2025-06-29

## 2025-06-19 RX ORDER — HEPARIN 100 UNIT/ML
500 SYRINGE INTRAVENOUS
Status: CANCELLED | OUTPATIENT
Start: 2025-06-19

## 2025-06-19 RX ADMIN — CARBOPLATIN 705 MG: 10 INJECTION, SOLUTION INTRAVENOUS at 11:06

## 2025-06-19 RX ADMIN — FAMOTIDINE 20 MG: 10 INJECTION, SOLUTION INTRAVENOUS at 09:06

## 2025-06-19 RX ADMIN — PACLITAXEL 168 MG: 6 INJECTION, SOLUTION INTRAVENOUS at 10:06

## 2025-06-19 RX ADMIN — APREPITANT 130 MG: 130 INJECTION, EMULSION INTRAVENOUS at 09:06

## 2025-06-19 RX ADMIN — Medication 10 ML: at 12:06

## 2025-06-19 RX ADMIN — DIPHENHYDRAMINE HYDROCHLORIDE 50 MG: 50 INJECTION INTRAMUSCULAR; INTRAVENOUS at 09:06

## 2025-06-19 RX ADMIN — DEXAMETHASONE SODIUM PHOSPHATE 0.25 MG: 4 INJECTION, SOLUTION INTRA-ARTICULAR; INTRALESIONAL; INTRAMUSCULAR; INTRAVENOUS; SOFT TISSUE at 09:06

## 2025-06-19 RX ADMIN — HEPARIN 500 UNITS: 100 SYRINGE at 12:06

## 2025-06-19 RX ADMIN — SODIUM CHLORIDE: 9 INJECTION, SOLUTION INTRAVENOUS at 09:06

## 2025-06-19 NOTE — NURSING
Pt ambulated to room with steady gait, accompanied by her , alert & oriented x4. Pt states she is taking her steroids at home. Pt c/o of rash to both arms stating it only itches when she goes in the sun and they are peeling. Arlene aware and sent pt steroid cream to pickup from pharmacy,also Arlene removed Keytruda from pt's tx plan on today due to the rash.

## 2025-06-26 ENCOUNTER — INFUSION (OUTPATIENT)
Dept: INFUSION THERAPY | Facility: HOSPITAL | Age: 65
End: 2025-06-26
Attending: INTERNAL MEDICINE
Payer: MEDICARE

## 2025-06-26 ENCOUNTER — LAB VISIT (OUTPATIENT)
Dept: HEMATOLOGY/ONCOLOGY | Facility: CLINIC | Age: 65
End: 2025-06-26
Payer: MEDICARE

## 2025-06-26 VITALS
DIASTOLIC BLOOD PRESSURE: 81 MMHG | RESPIRATION RATE: 18 BRPM | BODY MASS INDEX: 39.93 KG/M2 | HEIGHT: 62 IN | HEART RATE: 87 BPM | WEIGHT: 217 LBS | TEMPERATURE: 98 F | OXYGEN SATURATION: 95 % | SYSTOLIC BLOOD PRESSURE: 129 MMHG

## 2025-06-26 DIAGNOSIS — Z78.0 POSTMENOPAUSAL: ICD-10-CM

## 2025-06-26 DIAGNOSIS — C50.912 INVASIVE DUCTAL CARCINOMA OF LEFT BREAST: Primary | ICD-10-CM

## 2025-06-26 DIAGNOSIS — Z17.421 TRIPLE NEGATIVE BREAST CANCER: ICD-10-CM

## 2025-06-26 DIAGNOSIS — T45.1X5A IMMUNODEFICIENCY DUE TO CHEMOTHERAPY: ICD-10-CM

## 2025-06-26 DIAGNOSIS — C50.912 INVASIVE DUCTAL CARCINOMA OF LEFT BREAST: ICD-10-CM

## 2025-06-26 DIAGNOSIS — R53.83 OTHER FATIGUE: ICD-10-CM

## 2025-06-26 DIAGNOSIS — D84.821 IMMUNODEFICIENCY DUE TO CHEMOTHERAPY: ICD-10-CM

## 2025-06-26 DIAGNOSIS — C50.919 TRIPLE NEGATIVE BREAST CANCER: ICD-10-CM

## 2025-06-26 DIAGNOSIS — Z79.69 IMMUNODEFICIENCY DUE TO CHEMOTHERAPY: ICD-10-CM

## 2025-06-26 LAB
ALBUMIN SERPL-MCNC: 3.1 G/DL (ref 3.4–4.8)
ALBUMIN/GLOB SERPL: 0.8 RATIO (ref 1.1–2)
ALP SERPL-CCNC: 120 UNIT/L (ref 40–150)
ALT SERPL-CCNC: 19 UNIT/L (ref 0–55)
ANION GAP SERPL CALC-SCNC: 11 MEQ/L
AST SERPL-CCNC: 13 UNIT/L (ref 11–45)
BASOPHILS # BLD AUTO: 0.01 X10(3)/MCL
BASOPHILS NFR BLD AUTO: 0.3 %
BILIRUB SERPL-MCNC: 0.4 MG/DL
BUN SERPL-MCNC: 11.5 MG/DL (ref 9.8–20.1)
CALCIUM SERPL-MCNC: 9.1 MG/DL (ref 8.4–10.2)
CHLORIDE SERPL-SCNC: 103 MMOL/L (ref 98–107)
CO2 SERPL-SCNC: 26 MMOL/L (ref 23–31)
CREAT SERPL-MCNC: 0.87 MG/DL (ref 0.55–1.02)
CREAT/UREA NIT SERPL: 13
EOSINOPHIL # BLD AUTO: 0 X10(3)/MCL (ref 0–0.9)
EOSINOPHIL NFR BLD AUTO: 0 %
ERYTHROCYTE [DISTWIDTH] IN BLOOD BY AUTOMATED COUNT: 15.7 % (ref 11.5–17)
GFR SERPLBLD CREATININE-BSD FMLA CKD-EPI: >60 ML/MIN/1.73/M2
GLOBULIN SER-MCNC: 3.7 GM/DL (ref 2.4–3.5)
GLUCOSE SERPL-MCNC: 121 MG/DL (ref 82–115)
HCT VFR BLD AUTO: 29.4 % (ref 37–47)
HGB BLD-MCNC: 9.7 G/DL (ref 12–16)
IMM GRANULOCYTES # BLD AUTO: 0.02 X10(3)/MCL (ref 0–0.04)
IMM GRANULOCYTES NFR BLD AUTO: 0.5 %
LYMPHOCYTES # BLD AUTO: 1.56 X10(3)/MCL (ref 0.6–4.6)
LYMPHOCYTES NFR BLD AUTO: 40.9 %
MAGNESIUM SERPL-MCNC: 1.6 MG/DL (ref 1.6–2.6)
MCH RBC QN AUTO: 31 PG (ref 27–31)
MCHC RBC AUTO-ENTMCNC: 33 G/DL (ref 33–36)
MCV RBC AUTO: 93.9 FL (ref 80–94)
MONOCYTES # BLD AUTO: 0.34 X10(3)/MCL (ref 0.1–1.3)
MONOCYTES NFR BLD AUTO: 8.9 %
NEUTROPHILS # BLD AUTO: 1.88 X10(3)/MCL (ref 2.1–9.2)
NEUTROPHILS NFR BLD AUTO: 49.4 %
NRBC BLD AUTO-RTO: 0.5 %
PLATELET # BLD AUTO: 208 X10(3)/MCL (ref 130–400)
PMV BLD AUTO: 10 FL (ref 7.4–10.4)
POTASSIUM SERPL-SCNC: 3.9 MMOL/L (ref 3.5–5.1)
PROT SERPL-MCNC: 6.8 GM/DL (ref 5.8–7.6)
RBC # BLD AUTO: 3.13 X10(6)/MCL (ref 4.2–5.4)
SODIUM SERPL-SCNC: 140 MMOL/L (ref 136–145)
WBC # BLD AUTO: 3.81 X10(3)/MCL (ref 4.5–11.5)

## 2025-06-26 PROCEDURE — 80053 COMPREHEN METABOLIC PANEL: CPT

## 2025-06-26 PROCEDURE — 63600175 PHARM REV CODE 636 W HCPCS

## 2025-06-26 PROCEDURE — 25000003 PHARM REV CODE 250

## 2025-06-26 PROCEDURE — 96375 TX/PRO/DX INJ NEW DRUG ADDON: CPT

## 2025-06-26 PROCEDURE — 85025 COMPLETE CBC W/AUTO DIFF WBC: CPT

## 2025-06-26 PROCEDURE — 96413 CHEMO IV INFUSION 1 HR: CPT

## 2025-06-26 PROCEDURE — 83735 ASSAY OF MAGNESIUM: CPT

## 2025-06-26 RX ORDER — DIPHENHYDRAMINE HYDROCHLORIDE 50 MG/ML
50 INJECTION, SOLUTION INTRAMUSCULAR; INTRAVENOUS ONCE AS NEEDED
Status: CANCELLED | OUTPATIENT
Start: 2025-06-26

## 2025-06-26 RX ORDER — SODIUM CHLORIDE 0.9 % (FLUSH) 0.9 %
10 SYRINGE (ML) INJECTION
Status: CANCELLED | OUTPATIENT
Start: 2025-06-26

## 2025-06-26 RX ORDER — FAMOTIDINE 10 MG/ML
20 INJECTION, SOLUTION INTRAVENOUS
Status: CANCELLED | OUTPATIENT
Start: 2025-06-26

## 2025-06-26 RX ORDER — DIPHENHYDRAMINE HYDROCHLORIDE 50 MG/ML
50 INJECTION, SOLUTION INTRAMUSCULAR; INTRAVENOUS ONCE AS NEEDED
Status: DISCONTINUED | OUTPATIENT
Start: 2025-06-26 | End: 2025-06-26 | Stop reason: HOSPADM

## 2025-06-26 RX ORDER — EPINEPHRINE 1 MG/ML
0.3 INJECTION INTRAMUSCULAR; INTRAVENOUS; SUBCUTANEOUS ONCE AS NEEDED
Status: DISCONTINUED | OUTPATIENT
Start: 2025-06-26 | End: 2025-06-26 | Stop reason: HOSPADM

## 2025-06-26 RX ORDER — SODIUM CHLORIDE 0.9 % (FLUSH) 0.9 %
10 SYRINGE (ML) INJECTION
Status: DISCONTINUED | OUTPATIENT
Start: 2025-06-26 | End: 2025-06-26 | Stop reason: HOSPADM

## 2025-06-26 RX ORDER — DIPHENHYDRAMINE HYDROCHLORIDE 50 MG/ML
50 INJECTION, SOLUTION INTRAMUSCULAR; INTRAVENOUS
Status: COMPLETED | OUTPATIENT
Start: 2025-06-26 | End: 2025-06-26

## 2025-06-26 RX ORDER — DEXAMETHASONE SODIUM PHOSPHATE 10 MG/ML
10 INJECTION INTRAMUSCULAR; INTRAVENOUS
Status: CANCELLED | OUTPATIENT
Start: 2025-06-26

## 2025-06-26 RX ORDER — FAMOTIDINE 10 MG/ML
20 INJECTION, SOLUTION INTRAVENOUS
Status: COMPLETED | OUTPATIENT
Start: 2025-06-26 | End: 2025-06-26

## 2025-06-26 RX ORDER — DEXAMETHASONE SODIUM PHOSPHATE 10 MG/ML
10 INJECTION INTRAMUSCULAR; INTRAVENOUS
Status: COMPLETED | OUTPATIENT
Start: 2025-06-26 | End: 2025-06-26

## 2025-06-26 RX ORDER — EPINEPHRINE 0.3 MG/.3ML
0.3 INJECTION SUBCUTANEOUS ONCE AS NEEDED
Status: CANCELLED | OUTPATIENT
Start: 2025-06-26

## 2025-06-26 RX ORDER — HEPARIN 100 UNIT/ML
500 SYRINGE INTRAVENOUS
Status: CANCELLED | OUTPATIENT
Start: 2025-06-26

## 2025-06-26 RX ORDER — DIPHENHYDRAMINE HYDROCHLORIDE 50 MG/ML
50 INJECTION, SOLUTION INTRAMUSCULAR; INTRAVENOUS
Status: CANCELLED
Start: 2025-06-26

## 2025-06-26 RX ORDER — HEPARIN 100 UNIT/ML
500 SYRINGE INTRAVENOUS
Status: DISCONTINUED | OUTPATIENT
Start: 2025-06-26 | End: 2025-06-26 | Stop reason: HOSPADM

## 2025-06-26 RX ADMIN — SODIUM CHLORIDE: 9 INJECTION, SOLUTION INTRAVENOUS at 09:06

## 2025-06-26 RX ADMIN — DIPHENHYDRAMINE HYDROCHLORIDE 50 MG: 50 INJECTION INTRAMUSCULAR; INTRAVENOUS at 10:06

## 2025-06-26 RX ADMIN — HEPARIN 500 UNITS: 100 SYRINGE at 12:06

## 2025-06-26 RX ADMIN — DEXAMETHASONE SODIUM PHOSPHATE 10 MG: 10 INJECTION INTRAMUSCULAR; INTRAVENOUS at 10:06

## 2025-06-26 RX ADMIN — FAMOTIDINE 20 MG: 10 INJECTION, SOLUTION INTRAVENOUS at 10:06

## 2025-06-26 RX ADMIN — PACLITAXEL 168 MG: 6 INJECTION, SOLUTION INTRAVENOUS at 11:06

## 2025-06-26 NOTE — NURSING
"0771 Patient is here for labs & C4D8 Taxol. Patient ambulated to room accompanied by her spouse. Patient report rash to ryan arms is better but still itches. She reports the cream does help. She also c/o having "low energy." Patients c/o reported to Jose Mcclendon NP. Jose suggested patient try benadryl otc prn itching.     [facility]            "

## 2025-06-30 ENCOUNTER — OFFICE VISIT (OUTPATIENT)
Dept: URGENT CARE | Facility: CLINIC | Age: 65
End: 2025-06-30
Payer: MEDICARE

## 2025-06-30 VITALS
DIASTOLIC BLOOD PRESSURE: 76 MMHG | OXYGEN SATURATION: 99 % | HEIGHT: 62 IN | WEIGHT: 215 LBS | SYSTOLIC BLOOD PRESSURE: 109 MMHG | TEMPERATURE: 98 F | HEART RATE: 125 BPM | RESPIRATION RATE: 18 BRPM | BODY MASS INDEX: 39.56 KG/M2

## 2025-06-30 DIAGNOSIS — H60.501 ACUTE OTITIS EXTERNA OF RIGHT EAR, UNSPECIFIED TYPE: Primary | ICD-10-CM

## 2025-06-30 PROCEDURE — 99214 OFFICE O/P EST MOD 30 MIN: CPT | Mod: PBBFAC

## 2025-06-30 PROCEDURE — 99213 OFFICE O/P EST LOW 20 MIN: CPT | Mod: S$PBB,,,

## 2025-06-30 RX ORDER — AMOXICILLIN AND CLAVULANATE POTASSIUM 875; 125 MG/1; MG/1
1 TABLET, FILM COATED ORAL EVERY 12 HOURS
Qty: 10 TABLET | Refills: 0 | Status: SHIPPED | OUTPATIENT
Start: 2025-06-30 | End: 2025-07-05

## 2025-06-30 RX ORDER — CIPROFLOXACIN AND DEXAMETHASONE 3; 1 MG/ML; MG/ML
4 SUSPENSION/ DROPS AURICULAR (OTIC) 2 TIMES DAILY
Qty: 7.5 ML | Refills: 0 | Status: SHIPPED | OUTPATIENT
Start: 2025-06-30 | End: 2025-07-07

## 2025-06-30 NOTE — PROGRESS NOTES
"Subjective:       Patient ID: Alma Rosa Disla is a 65 y.o. female.    Vitals:  height is 5' 2" (1.575 m) and weight is 97.5 kg (215 lb). Her oral temperature is 98.2 °F (36.8 °C). Her blood pressure is 109/76 and her pulse is 125 (abnormal). Her respiration is 18 and oxygen saturation is 99%.     Chief Complaint: Ear Drainage (Right ear pressure and drainage. Patient reports having a popping sound and dizziness. Symptoms started this morning. )    64 y/o white female with complex medical hx including current chemotherapy, she is complaining of right ear pain and discharge worsening today, she denies any ear injuries or trauma.    Ear Drainage   Associated symptoms include ear discharge. Pertinent negatives include no hearing loss.       Constitution: Negative for fever.   HENT:  Positive for ear pain and ear discharge. Negative for foreign body in ear, tinnitus and hearing loss.    Allergic/Immunologic: Positive for immunocompromised state.       Objective:      Physical Exam   Constitutional: She is oriented to person, place, and time. She is cooperative. She is easily aroused.  Non-toxic appearance. She does not appear ill. awake  HENT:   Head: Normocephalic and atraumatic.   Ears:   Right Ear: Tympanic membrane and ear canal normal.   Left Ear: There is drainage, swelling and tenderness. There is mastoid tenderness.   Nose: Nose normal.   Abdominal: Normal appearance.   Neurological: She is alert, oriented to person, place, and time and easily aroused. GCS eye subscore is 4. GCS verbal subscore is 5. GCS motor subscore is 6.   Skin: Skin is not diaphoretic.   Psychiatric: Her speech is normal and behavior is normal.   Nursing note and vitals reviewed.        Assessment:       1. Acute otitis externa of right ear, unspecified type          Plan:     Copious amount of purulent exudate noted in right external ear, unable to visualize TM patient is also immunocompromised, we will give course of Augmentin as well as " Ciprodex.  Encouraged patient to keep area dry, may take Tylenol as needed for discomfort, return to clinic if symptoms does not improve with prescription.    Acute otitis externa of right ear, unspecified type  -     amoxicillin-clavulanate 875-125mg (AUGMENTIN) 875-125 mg per tablet; Take 1 tablet by mouth every 12 (twelve) hours. for 5 days  Dispense: 10 tablet; Refill: 0  -     ciprofloxacin-dexAMETHasone 0.3-0.1% (CIPRODEX) 0.3-0.1 % DrpS; Place 4 drops into both ears 2 (two) times daily. for 7 days  Dispense: 7.5 mL; Refill: 0

## 2025-07-01 DIAGNOSIS — C50.912 INVASIVE DUCTAL CARCINOMA OF LEFT BREAST: ICD-10-CM

## 2025-07-01 RX ORDER — DEXAMETHASONE 4 MG/1
TABLET ORAL
Qty: 24 TABLET | Refills: 2 | Status: SHIPPED | OUTPATIENT
Start: 2025-07-01

## 2025-07-02 DIAGNOSIS — C50.912 INVASIVE DUCTAL CARCINOMA OF BREAST, FEMALE, LEFT: ICD-10-CM

## 2025-07-02 DIAGNOSIS — C50.912 INVASIVE DUCTAL CARCINOMA OF LEFT BREAST: Primary | ICD-10-CM

## 2025-07-03 ENCOUNTER — INFUSION (OUTPATIENT)
Dept: INFUSION THERAPY | Facility: HOSPITAL | Age: 65
End: 2025-07-03
Attending: INTERNAL MEDICINE
Payer: MEDICARE

## 2025-07-03 ENCOUNTER — OFFICE VISIT (OUTPATIENT)
Dept: HEMATOLOGY/ONCOLOGY | Facility: CLINIC | Age: 65
End: 2025-07-03
Payer: MEDICARE

## 2025-07-03 ENCOUNTER — TELEPHONE (OUTPATIENT)
Dept: HEMATOLOGY/ONCOLOGY | Facility: CLINIC | Age: 65
End: 2025-07-03
Payer: MEDICARE

## 2025-07-03 VITALS
SYSTOLIC BLOOD PRESSURE: 144 MMHG | BODY MASS INDEX: 39.56 KG/M2 | RESPIRATION RATE: 18 BRPM | TEMPERATURE: 98 F | HEART RATE: 96 BPM | HEIGHT: 62 IN | DIASTOLIC BLOOD PRESSURE: 86 MMHG | OXYGEN SATURATION: 98 % | WEIGHT: 215 LBS

## 2025-07-03 VITALS — BODY MASS INDEX: 40.12 KG/M2 | HEIGHT: 62 IN | WEIGHT: 218 LBS

## 2025-07-03 DIAGNOSIS — C50.912 INVASIVE DUCTAL CARCINOMA OF LEFT BREAST: Primary | ICD-10-CM

## 2025-07-03 DIAGNOSIS — C50.912 INVASIVE DUCTAL CARCINOMA OF BREAST, FEMALE, LEFT: ICD-10-CM

## 2025-07-03 PROCEDURE — 99213 OFFICE O/P EST LOW 20 MIN: CPT | Mod: PBBFAC

## 2025-07-03 PROCEDURE — 96413 CHEMO IV INFUSION 1 HR: CPT

## 2025-07-03 PROCEDURE — A4216 STERILE WATER/SALINE, 10 ML: HCPCS

## 2025-07-03 PROCEDURE — 63600175 PHARM REV CODE 636 W HCPCS

## 2025-07-03 PROCEDURE — 25000003 PHARM REV CODE 250

## 2025-07-03 PROCEDURE — 96375 TX/PRO/DX INJ NEW DRUG ADDON: CPT

## 2025-07-03 RX ORDER — DIPHENHYDRAMINE HYDROCHLORIDE 50 MG/ML
50 INJECTION, SOLUTION INTRAMUSCULAR; INTRAVENOUS
Status: CANCELLED
Start: 2025-07-03

## 2025-07-03 RX ORDER — EPINEPHRINE 1 MG/ML
0.3 INJECTION INTRAMUSCULAR; INTRAVENOUS; SUBCUTANEOUS ONCE AS NEEDED
Status: DISCONTINUED | OUTPATIENT
Start: 2025-07-03 | End: 2025-07-03 | Stop reason: HOSPADM

## 2025-07-03 RX ORDER — DIPHENHYDRAMINE HYDROCHLORIDE 50 MG/ML
50 INJECTION, SOLUTION INTRAMUSCULAR; INTRAVENOUS ONCE AS NEEDED
Status: DISCONTINUED | OUTPATIENT
Start: 2025-07-03 | End: 2025-07-03 | Stop reason: HOSPADM

## 2025-07-03 RX ORDER — DEXAMETHASONE SODIUM PHOSPHATE 10 MG/ML
10 INJECTION INTRAMUSCULAR; INTRAVENOUS
Status: CANCELLED | OUTPATIENT
Start: 2025-07-03

## 2025-07-03 RX ORDER — HEPARIN 100 UNIT/ML
500 SYRINGE INTRAVENOUS
Status: DISCONTINUED | OUTPATIENT
Start: 2025-07-03 | End: 2025-07-03 | Stop reason: HOSPADM

## 2025-07-03 RX ORDER — SODIUM CHLORIDE 0.9 % (FLUSH) 0.9 %
10 SYRINGE (ML) INJECTION
Status: CANCELLED | OUTPATIENT
Start: 2025-07-03

## 2025-07-03 RX ORDER — SODIUM CHLORIDE 0.9 % (FLUSH) 0.9 %
10 SYRINGE (ML) INJECTION
Status: DISCONTINUED | OUTPATIENT
Start: 2025-07-03 | End: 2025-07-03 | Stop reason: HOSPADM

## 2025-07-03 RX ORDER — HEPARIN 100 UNIT/ML
500 SYRINGE INTRAVENOUS
Status: CANCELLED | OUTPATIENT
Start: 2025-07-03

## 2025-07-03 RX ORDER — GABAPENTIN 100 MG/1
100 CAPSULE ORAL 3 TIMES DAILY
Qty: 90 EACH | Refills: 11 | Status: SHIPPED | OUTPATIENT
Start: 2025-07-03 | End: 2026-07-03

## 2025-07-03 RX ORDER — DIPHENHYDRAMINE HYDROCHLORIDE 50 MG/ML
50 INJECTION, SOLUTION INTRAMUSCULAR; INTRAVENOUS ONCE AS NEEDED
Status: CANCELLED | OUTPATIENT
Start: 2025-07-03

## 2025-07-03 RX ORDER — FAMOTIDINE 10 MG/ML
20 INJECTION, SOLUTION INTRAVENOUS
Status: COMPLETED | OUTPATIENT
Start: 2025-07-03 | End: 2025-07-03

## 2025-07-03 RX ORDER — DEXAMETHASONE SODIUM PHOSPHATE 10 MG/ML
10 INJECTION INTRAMUSCULAR; INTRAVENOUS
Status: COMPLETED | OUTPATIENT
Start: 2025-07-03 | End: 2025-07-03

## 2025-07-03 RX ORDER — DIPHENHYDRAMINE HYDROCHLORIDE 50 MG/ML
50 INJECTION, SOLUTION INTRAMUSCULAR; INTRAVENOUS
Status: COMPLETED | OUTPATIENT
Start: 2025-07-03 | End: 2025-07-03

## 2025-07-03 RX ORDER — EPINEPHRINE 0.3 MG/.3ML
0.3 INJECTION SUBCUTANEOUS ONCE AS NEEDED
Status: CANCELLED | OUTPATIENT
Start: 2025-07-03

## 2025-07-03 RX ORDER — FAMOTIDINE 10 MG/ML
20 INJECTION, SOLUTION INTRAVENOUS
Status: CANCELLED | OUTPATIENT
Start: 2025-07-03

## 2025-07-03 RX ADMIN — SODIUM CHLORIDE: 9 INJECTION, SOLUTION INTRAVENOUS at 09:07

## 2025-07-03 RX ADMIN — PACLITAXEL 168 MG: 6 INJECTION, SOLUTION INTRAVENOUS at 10:07

## 2025-07-03 RX ADMIN — HEPARIN 500 UNITS: 100 SYRINGE at 11:07

## 2025-07-03 RX ADMIN — SODIUM CHLORIDE, PRESERVATIVE FREE 10 ML: 5 INJECTION INTRAVENOUS at 11:07

## 2025-07-03 RX ADMIN — DEXAMETHASONE SODIUM PHOSPHATE 10 MG: 10 INJECTION INTRAMUSCULAR; INTRAVENOUS at 09:07

## 2025-07-03 RX ADMIN — FAMOTIDINE 20 MG: 10 INJECTION, SOLUTION INTRAVENOUS at 10:07

## 2025-07-03 RX ADMIN — DIPHENHYDRAMINE HYDROCHLORIDE 50 MG: 50 INJECTION INTRAMUSCULAR; INTRAVENOUS at 10:07

## 2025-07-03 NOTE — PROGRESS NOTES
THERAPY EDUCATION: PEMBROLIZUMAB + DOXORUBICIN + CYCLOPHOSPHAMIDE      Chief Complaint: Therapy Education      Diagnosis:     Invasive ductal carcinoma of left breast         Current Treatment: Keytruda + Carboplatin Q3W / weekly Paclitaxel C1-4 completed on 7/3/25                                   Keytruda + Doxorubicin + Cyclophosphamide Q3W C5-8 to start on 7/10/25     Interval History      7/3/25: Mrs. Disla presents to the clinic accompanied by her sister for therapy education.Patient to completed C4D15 of Taxol today and will start C5 of Keytruda + Doxorubicin + Cyclophosphamide on 7/10/25. Patient reports no major complaints at today's visit. Denies fever, chills, N/V/D, constipation, recent infection, chest pain or unexplained bleeding or bruising. Patient stated Cymbalta for peripheral neuropathy      PLAN  -Baseline ECHO completed on 3/5/25  -Mediport placement completed on 3/24/25.   -Patient completed Keytruda + Carboplatin Q3W with weekly Paclitaxel completed on 7/3/25 with start of C5-8 Keytruda + Doxorubicin + Cyclophosphamide to start on 7/10/25. Patient understood that she will receive premedications given 30 minutes prior to each treatment to help prevent nausea.  -Antiemetic regimen schedule given and calendar made for guidance    Dexamethasone- Take 2 tablets by mouth daily on Days 2-4 of each treatment.    Olanzapine- Take 1 tablet by mouth daily in the evening on Days 1-4 of each treatment   -Zofran ODT PRN prescribed for at home with instructions to be taken by mouth every 8 hours as needed for nausea. If not working, Compazine can be prescribed as 2nd choice.    -OTC Imodium AD recommended for diarrhea (4-5 BMs a day). Take 2 tablets after the first loose bowel movement, and 1 tablet after each loose bowel movement after the first dose has been taken. No more than 4 tablets should be taken in any 24-hour period. If not working, Lomotil can be prescribed as 2nd choice.    -Mouth sore  prevention with 1-quart warm water with 1 tsp of baking soda and salt and alcohol-free mouthwash.   -Emphasized adequate hand-hygiene and limited contact with people who are sick.   -Monitor and notify any bleeding in urine, stool, or sputum.  As well as unusual bleeding or bruising and stomach pain.   - Emphasized hydration with 4 16 oz bottles of water a day.    -Importance of moisturizing with fragrance free lotion to prevent skin rash.  -D/C Cymbalta and start Gabapentin 1 capsule (100 mg total) by mouth 3 (three) times daily.   -Call clinic if fever >100.4, shakes or chills, shortness of breath, chest pain, uncontrolled vomiting or diarrhea, pain and tingling in the chest or arm, or just not feeling well.    -Plans to RTC on 4/24/25 for same day labs and toxicity check with NP.      DISCUSSION:    1.  A total of 60 minutes were spent in counseling today, in which 100% were face-to-face.  At today's chemotherapy teaching session, we discussed the patient's cancer diagnosis as well as planned therapy regimen, protocol, side effects and toxicities.  A handout of each therapeutic agent in the regimen was provided and reviewed in detail.    2.  The following side effects for chemotherapy were discussed but not limited to:    Cyclophosphamide Side Effects:  Allergic Reactions  Anemia  Breathing Problems  Bruising  Chills  Confusion  Cough  Diarrhea  Feeling Faint  Fever  Flushing  Hair Loss  Headache  Heart Failure  Infection  Injury  Itching  Nausea  Pain  Rash  Swelling  Vomiting  Weight Gain    Doxorubicin Side Effects:  Allergic Reactions  Breathing Problems  Bruising  Chest Pain  Chills  Cough  Diarrhea  Fever  Hair Loss  Infection  Itching  Low Blood Counts  Mouth Sores  Nausea  Pain  Rash  Swelling  Vomiting  Weakness    Pembrolizumab Side Effects:  Allergic Reactions  Breathing Problems  Chest Pain  Chills  Confusion  Constipation  Cough  Diarrhea  Dizziness  Dry Mouth  Dry Skin  Feeling  Faint  Fever  Flushing  Hair Loss  Headache  Infection  Injury  Itching  Joint Pain  Low Blood Counts  Muscle Pain  Nausea  Numbness  Pain  Rash  Swelling  Tingling  Weakness  Weight Loss                a.  Discussed the risk of infection while on chemotherapy related to pancytopenia, specifically a decrease in their white blood cell count.  Instructed to contact our office for temperature >100.5 F, chills, sudden onset cough or shortness of breath, symptoms of a urinary tract infection.                b.  Discussed the risk of anemia. Instructed to contact our office for dizziness, heart palpitations, or extreme or sudden changes in weakness.                c.  Discussed the risk of thrombocytopenia, which increases the risk of bruising or bleeding.  Instructed the patient to contact our office for spontaneous signs of bleeding, including nose bleeds, bleeding from the gums or mouth, blood in sputum, urine or stool and unusual or excessive bruising or rash.                d.  Discussed GI side effects including weight changes, changes in appetite, altered sense of taste, stomatitis, nausea, vomiting, diarrhea, constipation, and heartburn.                e.  Discussed  side effects including painful urination, changes in the amount of urination, possible urine color changes.  Discussed fertility issues and to prevent  pregnancy if of child bearing age.                f.  Discussed neurological side effects including the risk of peripheral neuropathy, either temporary or permanent.                g.  Discussed the potential for skin, hair, and nail changes.       3.  Instructed to contact our office for discussion of medication changes, the addition of vitamin and/or herbal supplementation as they may interact with some chemotherapy agents.    4.  Discussed dietary modifications and the need to maintain adequate caloric intake and proper oral hydration.  Recommended 64 ounces of fluid per day.    5.  Discussed  anti-emetic protocol and bowel regimen protocol.      6. The following side effects for immunotherapy were discussed but not limited to:    ·       We reviewed that side effects and toxicities typically occur after 4-10 weeks of treatment, and include but are not limited to:    ·       Discussed the risk of immune-mediated pneumonitis, including interstitial lung disease.  Instructed to contact our office for new or worsening chest pain, severe cough or shortness of breath.    ·       Discussed the risk of immune-mediated colitis and diarrhea. Instructed to contact our office for diarrhea she cannot control or that results in =4 bowel movements per day.    ·       Discussed the risk of immune mediated hepatitis. Instructed to contact office for right upper quadrant pain, yellowing of skin or eyes, concentrated yellow urine, severe nausea and vomiting, or severe abdominal pain.    ·       Discussed the risk of immune-mediated nephritis. Instructed patient on possible symptoms of decreased urine output, blood in the urine or very dark urine, overall swelling, or very high blood pressure.    ·       Discussed the risk of immune-mediated cardiomyopathy, including symptoms of edema, including periorbital edema, generalized edema, peripheral edema, and pulmonary edema.    ·       Discussed the risk of immune-mediated adrenal insufficiency or hypophysitis, including symptoms of headache, visual disturbances, fatigue, weight loss, and hypotension.    ·       Discussed immune-mediated thyroiditis, which can result in either hypothyroid or hyperthyroid. These will be managed according to symptoms, without a dose adjustment needed.    ·       Discussed the risk of rashes including an immune-mediated rash, Peña-Trent syndrome (SJS), and toxic epidermal necrolysis (TENS). Also possible are vitiligo/skin hypopigmentation.  Instructed to contact our office for any new onset moderate to severe rash.    ·       Discussed the  risk of immune-mediated encephalitis, including symptoms of altered mental status, headache, fever, confusion, agitation or hallucinations, seizures, loss of sensation or paralysis, muscle weakness, double vision, problems with speech or hearing, or loss on consciousness.    ·       Discussed musculoskeletal side effects of muscle and joint pain.    ·       Discussed respiratory side effects of cough and dyspnea, increased risk of upper respiratory infections.    ·       Discussed GI side effects of nausea and vomiting, and to a lesser extent abdominal pain, decreased appetite, wt loss, and constipation.    ·       Discussed possible side effects of edema, including periorbital edema, generalized edema, peripheral edema, and pulmonary edema.    ·       Discussed general side effects of fatigue and fever.    ·       Discussed possible electrolyte abnormalities.    ·       Discussed possible hematologic toxicities.    ·       Instructed to contact our office for discussion of medication changes, vaccination, the addition of vitamin and/or herbal supplementation as they may interact with some immunotherapy agents.    ·       Discussed dietary modifications and the need to maintain adequate caloric intake and proper oral hydration.  Recommended at least 64-80 oz of fluid per day.    ·       Discussed anti-emetic protocol and bowel regimen protocol.    ·       Office contact information given including after hours number.  Discussed there is an oncologist on call 24/7, 365 days, including weekends.  Provided business card with direct telephone number  to use as necessary during business hours and after hours    ·       In summary, the patient is in agreement with the treatment plan.  Questions appeared to be answered to their satisfaction. Consented the patient to the treatment plan and the patient was educated on the planned duration of the treatment and schedule of the treatment administration.  Copy scanned into the  chart.    Office contact information given including after hours number.  Discussed there is an oncologist on call 24/7, 365 days including weekends.  Provided primary nurse's information .    All questions answered to the satisfaction of the patient and family.     Follow up appointments given to patient.       GROVER SMYTH  PATIENT EDUCATOR  [facility]      Answers submitted by the patient for this visit:  Review of Systems Questionnaire (Submitted on 6/30/2025)  appetite change : No  unexpected weight change: No  mouth sores: No  visual disturbance: No  cough: No  shortness of breath: No  chest pain: No  abdominal pain: No  diarrhea: No  frequency: No  back pain: No  rash: Yes  headaches: No  adenopathy: No  nervous/ anxious: No

## 2025-07-03 NOTE — NURSING
Pt ambulated to room with steady gait, accompanied by family member. Pt alert & oriented x4, c/o being tired as her normal. Pt received her education this morning. ADRIAN Jacobs visited with pt in room and signed her tx plan. Tolerated tx well.

## 2025-07-07 ENCOUNTER — OFFICE VISIT (OUTPATIENT)
Dept: HEMATOLOGY/ONCOLOGY | Facility: CLINIC | Age: 65
End: 2025-07-07
Payer: MEDICARE

## 2025-07-07 DIAGNOSIS — C50.919 TRIPLE NEGATIVE BREAST CANCER: ICD-10-CM

## 2025-07-07 DIAGNOSIS — Z80.41 FAMILY HISTORY OF OVARIAN CANCER: ICD-10-CM

## 2025-07-07 DIAGNOSIS — Z17.421 TRIPLE NEGATIVE BREAST CANCER: ICD-10-CM

## 2025-07-07 DIAGNOSIS — C50.912 INVASIVE DUCTAL CARCINOMA OF LEFT BREAST: Primary | ICD-10-CM

## 2025-07-07 NOTE — PROGRESS NOTES
REFERRING PROVIDER: Dr. Maximo Fox      Patient ID: Alma Rosa Disla is a 65 y.o. female.    Chief Complaint: Personal recent diagnosis of triple negative breast cancer diagnosed 64y and a family history of cancer. Patient presented via Telemedicine Visit (audio and video) today for risk assessment, genetic counseling, and consideration for genetic testing.    HPI  Past Medical History:   Diagnosis Date    Breast cancer     left    Hyperlipidemia     Menopause 4/27/20020        Past Surgical History:   Procedure Laterality Date    ADENOIDECTOMY      CHOLECYSTECTOMY  1998    COLONOSCOPY      with biopsy    INSERTION OF TUNNELED CENTRAL VENOUS CATHETER (CVC) WITH SUBCUTANEOUS PORT N/A 3/24/2025    Procedure: [id number];  Surgeon: Vik Lynch Jr., MD;  Location: [facility];  Service: General;  Laterality: N/A;  Likely right    OCCLUSION, FALLOPIAN TUBE, USING DEVICE, BY VAGINAL OR SUPRAPUBIC APPROACH Bilateral     TONSILLECTOMY  1975        Review of patient's allergies indicates:  Patient has no known allergies.     Review of Systems        Problem List Items Addressed This Visit    None       Oncology History   Invasive ductal carcinoma of left breast        Family History   Problem Relation Name Age of Onset    Heart disease Mother Zari Jett     Diabetes Mother Zari Jett     Alzheimer's disease Mother Zari Jett     Stroke Mother Zari Jett     Arthritis Mother Zari Jett     Lung cancer Father HarrisEarl     Heart failure Father HarrisEarl     Ovarian cancer Sister      Diabetes Sister      Rashes / Skin problems Sister      Diabetes Brother Rakesh SLETTY     Epilepsy Brother Rakesh SLETTY     Diabetes Brother DOREEN JETT     Rashes / Skin problems Brother DOREEN FRAUSTO     Seizures Brother DOREEN JETT     Stroke Brother DOREEN JETT     Arthritis Brother Brando Jett     Heart failure Maternal Grandmother Mary     Diabetes Maternal Grandmother Mary     Osteoarthritis Maternal Aunt  Valentina Rayn Paternal Uncle              Assessment:   Risk Assessment:  This patient is at increased risk of having an inherited genetic mutation that increases the risk for cancer. Patient meets criteria for genetic testing based on the National Comprehensive Cancer Network (NCCN) criteria due to a personal diagnosis of triple negative breast cancer and a family history that includes ovarian cancer (sister) (see family history and pedigree). Based on the likelihood of having a mutation, BRCA1/2 Analysis with [employer] CancerNext-Expanded+RNAinsight panel testing was described in detail.    Education and Counseling:  [employer] Cancer[employer]-Expanded evaluates a broad number of hereditary cancer syndromes to help define patients' cancer risk. This cancer panel tests (77 total): AIP, ALK, APC, KHANH, BAP1, BARD1, BMPR1A, BRCA1, BRCA2, BRIP1, CDC73, CDH1, CDK4, CDKN1B, CDKN2A, CEBPA, CHEK2, DICER1, ETV6, FH, FLCN, GATA2, LZTR1, MAX, MEN1, MET, MLH1, MSH2, MSH6, MUTYH, NF1, NF2, NTHL1, PALB2, PHOX2B, PMS2, POT1, YAJPO8I, PTCH1, PTEN, RAD51C, RAD51D, RB1, RET, RPS20, RUNX1, SDHA, SDHAF2, SDHB, SDHC, SDHD, SMAD4, SMARCA4, SMARCB1, SMARCE1, STK11, SUFU, LCES185, TP53, TSC1, TSC2, VHL and WT1 (sequencing and deletion/duplication); AXIN2, CTNNA1, DDX41, EGFR, HOXB13, KIT, MBD4, MITF, MSH3, PDGFRA, POLD1 and POLE (sequencing only); EPCAM and GREM1 (deletion/duplication only). RNA data is routinely analyzed for use in variant interpretation for all genes.     Risks of cancer associated with inherited cancer predisposition mutations were discussed in detail.  If a mutation were found, this patient would have a significantly increased risk for cancer.  Inherited cancer syndromes included in this test, may have different, but still significant risk for cancer.  Risk of cancer with any particular gene mutation will be discussed at the time of results disclosure and based on the results.    The availability of  clinical management options for inherited cancer predisposition mutation carriers was discussed, including increased surveillance, chemoprevention, and prophylactic surgery. Details of the testing process, including benefits and limitations of genetic analysis as well as the implications of possible test results, were discussed.  Because this patient is the first member of the family to be tested comprehensive panel testing was presented.  Related insurance issues were discussed.      Summary:  This patient was evaluated for hereditary risk of cancer and was found to be at an increased risk of having an inherited cancer predisposition gene mutation.  The option of genetic testing was explained in detail, including the possible impact of this information on family members.  Since this patient wishes to proceed with testing an informed consent was obtained and blood drawn and sent to [facility].  Results will be expected 4 weeks from this time.  A follow-up appointment will be scheduled for results disclosure.       The patient location is: home.     Visit type: audiovisual    Face to Face time with patient: 20 minutes  40 minutes of total time spent on the encounter, which includes face to face time and non-face to face time preparing to see the patient (eg, review of tests), Obtaining and/or reviewing separately obtained history, Documenting clinical information in the electronic or other health record, Independently interpreting results (not separately reported) and communicating results to the patient/family/caregiver, or Care coordination (not separately reported).       Each patient to whom he or she provides medical services by telemedicine is:  (1) informed of the relationship between the physician and patient and the respective role of any other health care provider with respect to management of the patient; and (2) notified that he or she may decline to receive medical services by telemedicine and may  withdraw from such care at any time.    GLENDY PAZ, PhD

## 2025-07-09 ENCOUNTER — TELEPHONE (OUTPATIENT)
Dept: HEMATOLOGY/ONCOLOGY | Facility: CLINIC | Age: 65
End: 2025-07-09
Payer: MEDICARE

## 2025-07-09 PROBLEM — Z51.11 ENCOUNTER FOR CHEMOTHERAPY MANAGEMENT: Status: ACTIVE | Noted: 2025-07-09

## 2025-07-09 PROBLEM — T45.1X5A ANEMIA DUE TO CHEMOTHERAPY: Status: ACTIVE | Noted: 2025-07-09

## 2025-07-09 PROBLEM — Z29.89 IMMUNOTHERAPY ENCOUNTER: Status: ACTIVE | Noted: 2025-07-09

## 2025-07-09 PROBLEM — L27.0 DERMATITIS, DRUG-INDUCED: Status: ACTIVE | Noted: 2025-07-09

## 2025-07-09 PROBLEM — D64.81 ANEMIA DUE TO CHEMOTHERAPY: Status: ACTIVE | Noted: 2025-07-09

## 2025-07-09 RX ORDER — HEPARIN 100 UNIT/ML
500 SYRINGE INTRAVENOUS
Status: CANCELLED | OUTPATIENT
Start: 2025-07-10

## 2025-07-09 RX ORDER — DIPHENHYDRAMINE HYDROCHLORIDE 50 MG/ML
50 INJECTION, SOLUTION INTRAMUSCULAR; INTRAVENOUS ONCE AS NEEDED
Status: CANCELLED | OUTPATIENT
Start: 2025-07-10

## 2025-07-09 RX ORDER — EPINEPHRINE 0.3 MG/.3ML
0.3 INJECTION SUBCUTANEOUS ONCE AS NEEDED
Status: CANCELLED | OUTPATIENT
Start: 2025-07-10

## 2025-07-09 RX ORDER — SODIUM CHLORIDE 0.9 % (FLUSH) 0.9 %
10 SYRINGE (ML) INJECTION
Status: CANCELLED | OUTPATIENT
Start: 2025-07-10

## 2025-07-09 RX ORDER — DOXORUBICIN HYDROCHLORIDE 2 MG/ML
60 INJECTION, SOLUTION INTRAVENOUS
Status: CANCELLED | OUTPATIENT
Start: 2025-07-10

## 2025-07-10 ENCOUNTER — OFFICE VISIT (OUTPATIENT)
Dept: HEMATOLOGY/ONCOLOGY | Facility: CLINIC | Age: 65
End: 2025-07-10
Attending: INTERNAL MEDICINE
Payer: MEDICARE

## 2025-07-10 ENCOUNTER — INFUSION (OUTPATIENT)
Dept: INFUSION THERAPY | Facility: HOSPITAL | Age: 65
End: 2025-07-10
Attending: INTERNAL MEDICINE
Payer: MEDICARE

## 2025-07-10 VITALS
HEIGHT: 62 IN | DIASTOLIC BLOOD PRESSURE: 66 MMHG | RESPIRATION RATE: 20 BRPM | OXYGEN SATURATION: 99 % | HEART RATE: 112 BPM | TEMPERATURE: 98 F | SYSTOLIC BLOOD PRESSURE: 116 MMHG | BODY MASS INDEX: 40.12 KG/M2 | WEIGHT: 218 LBS

## 2025-07-10 VITALS
RESPIRATION RATE: 20 BRPM | OXYGEN SATURATION: 98 % | HEART RATE: 114 BPM | TEMPERATURE: 98 F | SYSTOLIC BLOOD PRESSURE: 99 MMHG | DIASTOLIC BLOOD PRESSURE: 66 MMHG

## 2025-07-10 DIAGNOSIS — D64.81 ANEMIA DUE TO CHEMOTHERAPY: ICD-10-CM

## 2025-07-10 DIAGNOSIS — R42 DIZZINESS AND GIDDINESS: Primary | ICD-10-CM

## 2025-07-10 DIAGNOSIS — C50.912 INVASIVE DUCTAL CARCINOMA OF LEFT BREAST: ICD-10-CM

## 2025-07-10 DIAGNOSIS — Z17.421 TRIPLE NEGATIVE BREAST CANCER: ICD-10-CM

## 2025-07-10 DIAGNOSIS — T45.1X5A ANEMIA DUE TO CHEMOTHERAPY: ICD-10-CM

## 2025-07-10 DIAGNOSIS — Z79.69 IMMUNODEFICIENCY DUE TO CHEMOTHERAPY: ICD-10-CM

## 2025-07-10 DIAGNOSIS — Z51.11 CHEMOTHERAPY MANAGEMENT, ENCOUNTER FOR: ICD-10-CM

## 2025-07-10 DIAGNOSIS — C50.919 TRIPLE NEGATIVE BREAST CANCER: ICD-10-CM

## 2025-07-10 DIAGNOSIS — H93.13 TINNITUS OF BOTH EARS: ICD-10-CM

## 2025-07-10 DIAGNOSIS — Z80.1 FAMILY HISTORY OF LUNG CANCER: ICD-10-CM

## 2025-07-10 DIAGNOSIS — Z29.89 IMMUNOTHERAPY ENCOUNTER: ICD-10-CM

## 2025-07-10 DIAGNOSIS — T45.1X5A IMMUNOSUPPRESSED DUE TO CHEMOTHERAPY: ICD-10-CM

## 2025-07-10 DIAGNOSIS — E03.8 SUBCLINICAL HYPOTHYROIDISM: ICD-10-CM

## 2025-07-10 DIAGNOSIS — C50.912 INVASIVE DUCTAL CARCINOMA OF LEFT BREAST: Primary | ICD-10-CM

## 2025-07-10 DIAGNOSIS — Z80.41 FAMILY HISTORY OF OVARIAN CANCER: ICD-10-CM

## 2025-07-10 DIAGNOSIS — E46 PROTEIN-CALORIE MALNUTRITION, UNSPECIFIED SEVERITY: ICD-10-CM

## 2025-07-10 DIAGNOSIS — H91.93 BILATERAL HEARING LOSS, UNSPECIFIED HEARING LOSS TYPE: Primary | ICD-10-CM

## 2025-07-10 DIAGNOSIS — R76.8 POSITIVE ANA (ANTINUCLEAR ANTIBODY): Chronic | ICD-10-CM

## 2025-07-10 DIAGNOSIS — D84.821 IMMUNODEFICIENCY DUE TO CHEMOTHERAPY: ICD-10-CM

## 2025-07-10 DIAGNOSIS — T45.1X5A IMMUNODEFICIENCY DUE TO CHEMOTHERAPY: ICD-10-CM

## 2025-07-10 DIAGNOSIS — E83.42 HYPOMAGNESEMIA: ICD-10-CM

## 2025-07-10 DIAGNOSIS — Z79.69 IMMUNOSUPPRESSED DUE TO CHEMOTHERAPY: ICD-10-CM

## 2025-07-10 DIAGNOSIS — L27.0 DERMATITIS, DRUG-INDUCED: ICD-10-CM

## 2025-07-10 DIAGNOSIS — Z78.0 POSTMENOPAUSAL: ICD-10-CM

## 2025-07-10 DIAGNOSIS — R42 VERTIGO: ICD-10-CM

## 2025-07-10 DIAGNOSIS — D84.821 IMMUNOSUPPRESSED DUE TO CHEMOTHERAPY: ICD-10-CM

## 2025-07-10 DIAGNOSIS — Z51.11 ENCOUNTER FOR CHEMOTHERAPY MANAGEMENT: ICD-10-CM

## 2025-07-10 LAB
FERRITIN SERPL-MCNC: 1629.38 NG/ML (ref 4.63–204)
FOLATE SERPL-MCNC: 11.8 NG/ML (ref 7–31.4)
HAPTOGLOB SERPL-MCNC: 409 MG/DL (ref 63–273)
IRON SATN MFR SERPL: 17 % (ref 20–50)
IRON SERPL-MCNC: 38 UG/DL (ref 50–170)
LDH SERPL-CCNC: 397 U/L (ref 125–220)
RET# (OHS): 0.08 X10E6/UL (ref 0.02–0.08)
RETICULOCYTE COUNT AUTOMATED (OLG): 2.79 % (ref 1.1–2.1)
TIBC SERPL-MCNC: 182 UG/DL (ref 70–310)
TIBC SERPL-MCNC: 220 UG/DL (ref 250–450)
TRANSFERRIN SERPL-MCNC: 195 MG/DL (ref 173–360)
VIT B12 SERPL-MCNC: 338 PG/ML (ref 213–816)

## 2025-07-10 PROCEDURE — A4216 STERILE WATER/SALINE, 10 ML: HCPCS | Performed by: INTERNAL MEDICINE

## 2025-07-10 PROCEDURE — 63600175 PHARM REV CODE 636 W HCPCS: Mod: JZ,TB | Performed by: INTERNAL MEDICINE

## 2025-07-10 PROCEDURE — 99214 OFFICE O/P EST MOD 30 MIN: CPT | Mod: PBBFAC | Performed by: INTERNAL MEDICINE

## 2025-07-10 PROCEDURE — 82746 ASSAY OF FOLIC ACID SERUM: CPT | Performed by: INTERNAL MEDICINE

## 2025-07-10 PROCEDURE — 25000003 PHARM REV CODE 250: Performed by: INTERNAL MEDICINE

## 2025-07-10 PROCEDURE — 96367 TX/PROPH/DG ADDL SEQ IV INF: CPT

## 2025-07-10 PROCEDURE — 96413 CHEMO IV INFUSION 1 HR: CPT

## 2025-07-10 PROCEDURE — 96417 CHEMO IV INFUS EACH ADDL SEQ: CPT

## 2025-07-10 PROCEDURE — 96368 THER/DIAG CONCURRENT INF: CPT

## 2025-07-10 PROCEDURE — 96366 THER/PROPH/DIAG IV INF ADDON: CPT

## 2025-07-10 PROCEDURE — 96375 TX/PRO/DX INJ NEW DRUG ADDON: CPT

## 2025-07-10 PROCEDURE — 96411 CHEMO IV PUSH ADDL DRUG: CPT

## 2025-07-10 RX ORDER — HEPARIN 100 UNIT/ML
500 SYRINGE INTRAVENOUS
Status: DISCONTINUED | OUTPATIENT
Start: 2025-07-10 | End: 2025-07-10 | Stop reason: HOSPADM

## 2025-07-10 RX ORDER — MAGNESIUM SULFATE HEPTAHYDRATE 40 MG/ML
2 INJECTION, SOLUTION INTRAVENOUS ONCE
Status: COMPLETED | OUTPATIENT
Start: 2025-07-10 | End: 2025-07-10

## 2025-07-10 RX ORDER — SODIUM CHLORIDE 0.9 % (FLUSH) 0.9 %
10 SYRINGE (ML) INJECTION
Status: DISCONTINUED | OUTPATIENT
Start: 2025-07-10 | End: 2025-07-10 | Stop reason: HOSPADM

## 2025-07-10 RX ORDER — LANOLIN ALCOHOL/MO/W.PET/CERES
400 CREAM (GRAM) TOPICAL 2 TIMES DAILY
Qty: 28 TABLET | Refills: 0 | Status: SHIPPED | OUTPATIENT
Start: 2025-07-10 | End: 2025-07-24

## 2025-07-10 RX ORDER — DIPHENHYDRAMINE HYDROCHLORIDE 50 MG/ML
50 INJECTION, SOLUTION INTRAMUSCULAR; INTRAVENOUS ONCE AS NEEDED
Status: DISCONTINUED | OUTPATIENT
Start: 2025-07-10 | End: 2025-07-10 | Stop reason: HOSPADM

## 2025-07-10 RX ORDER — DOXORUBICIN HYDROCHLORIDE 2 MG/ML
60 INJECTION, SOLUTION INTRAVENOUS
Status: COMPLETED | OUTPATIENT
Start: 2025-07-10 | End: 2025-07-10

## 2025-07-10 RX ORDER — EPINEPHRINE 1 MG/ML
0.3 INJECTION INTRAMUSCULAR; INTRAVENOUS; SUBCUTANEOUS ONCE AS NEEDED
Status: DISCONTINUED | OUTPATIENT
Start: 2025-07-10 | End: 2025-07-10 | Stop reason: HOSPADM

## 2025-07-10 RX ADMIN — Medication 10 ML: at 03:07

## 2025-07-10 RX ADMIN — HEPARIN 500 UNITS: 100 SYRINGE at 03:07

## 2025-07-10 RX ADMIN — DEXAMETHASONE SODIUM PHOSPHATE 0.25 MG: 4 INJECTION, SOLUTION INTRA-ARTICULAR; INTRALESIONAL; INTRAMUSCULAR; INTRAVENOUS; SOFT TISSUE at 11:07

## 2025-07-10 RX ADMIN — DOXORUBICIN HYDROCHLORIDE 128 MG: 2 INJECTION, SOLUTION INTRAVENOUS at 12:07

## 2025-07-10 RX ADMIN — CYCLOPHOSPHAMIDE 1280 MG: 1 INJECTION INTRAVENOUS at 01:07

## 2025-07-10 RX ADMIN — SODIUM CHLORIDE: 9 INJECTION, SOLUTION INTRAVENOUS at 10:07

## 2025-07-10 RX ADMIN — MAGNESIUM SULFATE HEPTAHYDRATE 2 G: 40 INJECTION, SOLUTION INTRAVENOUS at 01:07

## 2025-07-10 RX ADMIN — SODIUM CHLORIDE 200 MG: 9 INJECTION, SOLUTION INTRAVENOUS at 11:07

## 2025-07-10 NOTE — Clinical Note
Orders for 07/10/2025:  Continue chemotherapy CBC and CMP weekly  TSH and free T4 every 3 weeks  Re-stage with bilateral breast MRI with and without contrast after completion of 8 cycles of chemotherapy -07/10/2025:  Infuse magnesium sulfate 2 g IV per pharmacy protocol; start magnesium oxide 400 mg p.o. b.i.d. x2 weeks; no refills; in 2 weeks, recheck CMP and magnesium level For neuropathy, continue Cymbalta Check retic count, serum iron, TIBC, ferritin, B12 level, folic acid level, LDH, haptoglobin Genetic testing Follow-up with NP in 2 weeks for toxicity check.

## 2025-07-10 NOTE — NURSING
MIA Quiroga LPN escorted pt & spouse to Infusion for Cycle 5 Day 1 Keytruda/AC and 2 grams of Magnesium IV; port accessed and blood sent to lab for folate; no complaints at present.

## 2025-07-10 NOTE — PROGRESS NOTES
History:  Past Medical History:   Diagnosis Date    Breast cancer     left    Hyperlipidemia     Menopause 4/27/20020     Past Surgical History:   Procedure Laterality Date    ADENOIDECTOMY      CHOLECYSTECTOMY  1998    COLONOSCOPY      with biopsy    INSERTION OF TUNNELED CENTRAL VENOUS CATHETER (CVC) WITH SUBCUTANEOUS PORT N/A 3/24/2025    Procedure: [id number];  Surgeon: Vik Lynch Jr., MD;  Location: [facility];  Service: General;  Laterality: N/A;  Likely right    OCCLUSION, FALLOPIAN TUBE, USING DEVICE, BY VAGINAL OR SUPRAPUBIC APPROACH Bilateral     TONSILLECTOMY  1975      Social History     Socioeconomic History    Marital status:    Tobacco Use    Smoking status: Never     Passive exposure: Never    Smokeless tobacco: Never   Substance and Sexual Activity    Alcohol use: Not Currently     Comment: I ONLY WINE MAYBE TWICE year    Drug use: Never    Sexual activity: Yes     Partners: Male     Social Drivers of Health     Financial Resource Strain: Low Risk  (12/5/2024)    Overall Financial Resource Strain (CARDIA)     Difficulty of Paying Living Expenses: Not very hard   Food Insecurity: No Food Insecurity (12/5/2024)    Hunger Vital Sign     Worried About Running Out of Food in the Last Year: Never true     Ran Out of Food in the Last Year: Never true   Transportation Needs: No Transportation Needs (3/20/2024)    PRAPARE - Transportation     Lack of Transportation (Medical): No     Lack of Transportation (Non-Medical): No   Physical Activity: Unknown (12/5/2024)    Exercise Vital Sign     Days of Exercise per Week: 7 days   Stress: No Stress Concern Present (12/5/2024)    [employer] - Occupational Stress Questionnaire     Feeling of Stress : Only a little   Housing Stability: Unknown (3/20/2024)    Housing Stability Vital Sign     Unable to Pay for Housing in the Last Year: No     Unstable Housing in the Last Year: No      Family History   Problem  Relation Name Age of Onset    Heart disease Mother Zari Vinay     Diabetes Mother Zari Vinay     Alzheimer's disease Mother Zari Vinay     Stroke Mother Zari Vinay     Arthritis Mother Zari Vinay     Lung cancer Father LoboEarjacob     Heart failure Father LoboEarjacob     Ovarian cancer Sister      Diabetes Sister      Rashes / Skin problems Sister      Diabetes Brother Rakesh SLETTY     Epilepsy Brother Rakesh SLETTY     Diabetes Brother DOREEN FRAUSTO     Rashes / Skin problems Brother DOREEN FRAUSTO     Seizures Brother DOREEN FRAUSTO     Stroke Brother DOREEN FRAUSTO     Arthritis Brother Brando Frausto     Heart failure Maternal Grandmother Mary     Diabetes Maternal Grandmother Mary     Osteoarthritis Maternal Aunt Valentina     Asbestos Paternal Uncle        Reason for Follow-up:  -IDC left breast, biopsy 01/27/2025, triple negative, overall grade 3; cT2 cN0 MX, AJCC anatomic stage at least IIA, clinical prognostic stage at least IIB  -postmenopausal  -father experienced lung cancer; sister experienced ovarian cancer  -obesity    History of Present Illness:   Invasive ductal carcinoma of left breast      Oncologic/Hematologic History:  Oncology History   Invasive ductal carcinoma of left breast   1/27/2025 Cancer Staged    Staging form: Breast, AJCC 8th Edition  - Clinical stage from 1/27/2025: Stage IIB (cT2, cN0, cM0, G3, ER-, CA-, HER2-)     2/22/2025 Initial Diagnosis    Invasive ductal carcinoma of left breast     4/17/2025 -  Chemotherapy    Treatment Summary   Plan Name: OP BREAST PEMBROLIZUMAB CARBOPLATIN (AUC 5) WITH WEEKLY PACLITAXEL FOLLOWED BY PEMBROLIZUMAB DOXORUBICIN CYCLOPHOSPHAMIDE FOLLOWED BY PEMBROLIZUMAB 200 MG Q3W  Treatment Goal: Control  Status: Active  Start Date: 4/17/2025  End Date: 3/19/2026 (Planned)  Provider: Maximo Fox MD  Chemotherapy: DOXOrubicin chemo injection 128 mg, 60 mg/m2 = 128 mg, Intravenous, Clinic/HOD 1 time, 0 of 4 cycles  CARBOplatin (PARAPLATIN) 750 mg in 0.9% NaCl 285  mL chemo infusion, 750 mg (108.3 % of original dose 692.5 mg), Intravenous, Clinic/HOD 1 time, 4 of 4 cycles  Dose modification:   (original dose 692.5 mg, Cycle 1)  Administration: 750 mg (2025), 705 mg (2025), 715 mg (2025), 705 mg (2025)  cycloPHOSphamide 600 mg/m2 = 1,280 mg in 0.9% NaCl 256.4 mL chemo infusion, 600 mg/m2 = 1,280 mg, Intravenous, Clinic/HOD 1 time, 0 of 4 cycles  PACLitaxeL (TAXOL) 80 mg/m2 = 168 mg in 0.9% NaCl 250 mL chemo infusion, 80 mg/m2 = 168 mg, Intravenous, Clinic/HOD 1 time, 4 of 4 cycles  Administration: 168 mg (2025), 168 mg (2025), 168 mg (2025), 168 mg (2025), 168 mg (5/15/2025), 168 mg (2025), 168 mg (2025), 168 mg (2025), 168 mg (2025), 168 mg (2025), 168 mg (2025), 168 mg (7/3/2025)     Past medical history: Dyslipidemia; postmenopausal; sensorineural hearing loss; obesity; psoriatic arthritis; JENNIFER positive; bilateral tinnitus  Procedure/surgical history: Adenoidectomy; cholecystectomy; colonoscopy; tonsillectomy    -2025:  Says that she underwent colonoscopy with [facility] 3 years ago, and that it showed 1 polyp; apparently, the recommended repeat colonoscopy in 5 years  Social history:  .  Lives in [place].  Has 1 child.  A 41-year-old son  from complications of diabetes mellitus.  She cleans houses.  She smoked <1 ppd x2 years; quit 40 years ago.  No alcohol or illicit drug abuse.  Family history: Father experienced lung cancer at age 84 (was a smoker); sister experienced ovarian cancer at age 54; maternal aunt experienced unknown kind of cancer in her 60s  Health maintenance:   -2023:  Thin prep cervical Pap smear:  NILM, high-risk HPV negative  -2025:  Says that she underwent colonoscopy with [facility] 3 years ago, and that it showed 1 polyp; apparently, the recommended repeat colonoscopy in 5 years  Menstrual/Ob gyn history: Menarche at age  13; LMP at age 60; no menopausal symptoms except for irritability x3-4 years; never took hormone replacement therapy; last OCP use at age 36 (used OCPs for 3 years); for pregnancies; 2 live births; 2 miscarriages;  her 2nd child for 6 months; 1st child at age 22      64-year-old lady, referred from [facility], with triple negative invasive ductal carcinoma.    Investigations reviewed:  -12/13/2023:  Bilateral screening mammogram (comparison:  10/23/2013 mammogram):  BI-RADS category 0-incomplete  -01/27/2025:  bilateral diagnostic mammogram, limited ultrasound left breast:  HISTORY: 64-year-old female presents for further evaluation of a mass in the upper-outer quadrant of the left breast recalled from screening at [facility] on 12/13/2023.    Overall study BI-RADS: 5-highly suggestive of malignancy (per mammogram, up to 23 mm mass; per ultrasound, 23 x 17 x 22 mm mass)  -01/27/2025:  left breast mass 2 o'clock, 7 cm from nipple, ultrasound-guided needle core biopsy:  IDC, overall grade 3  -ER negative (0%), ME negative (0%), HER2 negative (score 0), Ki-67 high proliferation (65%)    02/24/2025:    Pleasant, healthy-appearing lady who presents for initial medical oncology consultation, accompanied by .  In no acute discomfort.    Overall, feels well and healthy.  Great appetite.  Never noticed any breast lumps, breast pain, nipple discharge, skin or nipple changes.  Never noticed regional lymphadenopathy.    No anorexia, unintentional weight loss, weakness, fatigue, unusual headaches, focal neurological symptoms, chest pain, cough, dyspnea, abdominal pain, nausea, vomiting, GI bleeding, change in bowel habits, bone pains, any urinary problems, postmenopausal spotting, etc..  ECOG 0.    Interval History:  [No matching plan found]   OP BREAST PEMBROLIZUMAB CARBOPLATIN (AUC 5) WITH WEEKLY PACLITAXEL FOLLOWED BY PEMBROLIZUMAB DOXORUBICIN CYCLOPHOSPHAMIDE FOLLOWED BY PEMBROLIZUMAB 200  MG Q3W     04/03/2025:   -03/05/2025:  Pre chemotherapy TTE: LVEF 55-60%  -03/06/2025: Staging CTs C/A/P with contrast:  Lobulated left breast nodule consistent with patient's known primary breast malignancy with no evidence of metastatic disease on this exam.  -03/06/2025:  Staging whole-body nuclear medicine bone scan: No bone metastases  -03/13/2025: Bilateral breast MRI with and without contrast: Left breast lesion #1, 2.4 x 2.2 x 2.9 cm, biopsy-proven malignancy, BI-RADS 6; no suspicious left axillary or internal mammary adenopathy; no evidence of malignancy in right breast  -03/24/2025:  Left IJ MediPort placed  -03/24/2025: CBC unremarkable; CMP unremarkable  Presents for a follow-up visit, accompanied by a male .  In no acute discomfort.  She feels that the breast lump is enlarging.  No pain.  No nipple discharge.  No skin changes.  No ulceration or satellite nodules.  No regional lymphadenopathy.  No weakness, fatigue, malaise, unusual headaches, focal neurological symptoms, chest pain, cough, dyspnea, abdominal pain, nausea, vomiting, change in bowel habits, GI bleeding, bone pains, etc..  ECOG 0.  We discussed all labs and scans in detail.    07/10/2025:   -S/P neoadjuvant carboplatin/Taxol/Keytruda every 3 weeks x4 cycles:  04/17/2025-07/03/2025)  -Keytruda held C4 D1 due to dermatologic toxicity  -for peripheral neuropathy, Cymbalta started 05/29/2025  -07/08/2025: Restaging breast MRI with and without contrast: Report pending  -no severe cytopenias with chemotherapy accept progressive drop in hemoglobin:  13.8 on 04/17/2025, down to 8.9 on 07/03/2025 (secondary to myelosuppression with chemotherapy)  -TSH and free T4 normal on 06/19/2025  -07/07/2025: Genetics consultation  -07/08/2025:  Restaging breast MRI with and without contrast post 4 cycles of chemotherapy:  Report still pending  -07/10/2025: WBC 3.7, hemoglobin 9.0, MCV 98.2, platelets normal, differential count normal, ANC 2.38; CMP  reviewed; magnesium 1.3 low, potassium 4.5 normal  Presents for a follow-up visit, accompanied by .  Unfortunately, restaging breast MRI report is pending; she is disappointed.  Occasionally, feels dizzy.  She has to hold onto objects.  Has not fallen.  No loss of consciousness or seizures.  No other focal neurological symptoms.  There was 1 occasion when she felt the room spinning around her.  We will get brain MRI scan done to rule out brain metastases.  Fatigue.  Appetite is variable.  She does not want to start any appetite stimulant.  Fatigue is secondary to chemotherapy-induced neuropathy.  She denies bleeding in any other form.  No new lumps or lymphadenopathy.  Nausea.  Takes medication.  No vomiting.  Numbness in fingertips and feet; no pain or tingling; says that she ran out of Cymbalta and has been taking gabapentin 100 mg p.o. t.i.d..  Today, magnesium level is low.  She denies muscle cramps or palpitations.  Today, we will start neoadjuvant Keytruda/Cytoxan/Adriamycin every 3 weeks x4 cycles.  Genetic testing is pending.  No abdominal pain, nausea, vomiting, GI bleeding.    No chest pain, cough, dyspnea, or hemoptysis.  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  Symptoms are subsiding.  Also has some rash over knees which is not bothering her.  No other immune related adverse events with Keytruda in the form of immune pneumonitis, myocarditis, hepatitis, nephritis, colitis, endocrinopathies, ophthalmitis, cerebritis, etc..    ECOG 0-1.    Immunization History   Administered Date(s) Administered    COVID-19, MRNA, LN-S, PF (Pfizer) (Purple Cap) 07/17/2021, 08/07/2021    Influenza (FLUBLOK) - Quadrivalent - Recombinant - PF *Preferred* (egg allergy) 11/29/2020    Influenza - Quadrivalent - MDCK - PF 01/03/2022, 11/30/2023    Influenza - Quadrivalent - PF *Preferred* (6 months and older) 11/04/2017, 09/23/2022    Influenza - Trivalent - Afluria,  Fluzone MDV 11/07/2011    Influenza - Trivalent - Fluarix, Flulaval, Fluzone, Afluria - PF 11/07/2011, 12/15/2014, 11/16/2016, 11/04/2017, 11/29/2020, 01/03/2022    Pneumococcal Conjugate - 20 Valent 02/24/2025    Tdap 08/24/2023    Zoster Recombinant 06/17/2022, 09/23/2022     Allergies as of 07/10/2025    (No Known Allergies)     Medications:  Medications Ordered Prior to Encounter[1]    Review of Systems:   All systems reviewed and found to be negative except for the symptoms detailed above    Physical Examination:   VITAL SIGNS:   Vitals:    07/10/25 0921   BP: 116/66   Pulse: (!) 112   Resp: 20   Temp: 97.9 °F (36.6 °C)       GENERAL:  In no apparent distress.    HEAD:  No signs of head trauma.  EYES:  Pupils are equal.  Extraocular motions intact.    EARS:  Hearing grossly intact.  MOUTH:  Oropharynx is normal.   NECK:  No adenopathy, no JVD.     CHEST:  Chest with clear breath sounds bilaterally.  No wheezes, rales, rhonchi.    CARDIAC:  Regular rate and rhythm.  S1 and S2, without murmurs, gallops, rubs.  VASCULAR:  No Edema.  Peripheral pulses normal and equal in all extremities.  ABDOMEN:  Soft, without detectable tenderness.  No sign of distention.  No   rebound or guarding, and no masses palpated.   Bowel Sounds normal.  MUSCULOSKELETAL:  Good range of motion of all major joints. Extremities without clubbing, cyanosis or edema.    NEUROLOGIC EXAM:  Alert and oriented x 3.  No focal sensory or strength deficits.   Speech normal.  Follows commands.  PSYCHIATRIC:  Mood normal.    Assessment:  Problem List Items Addressed This Visit       Positive JENNIFER (antinuclear antibody) (Chronic)    Tinnitus of both ears    Bilateral hearing loss - Primary    Triple negative breast cancer    Invasive ductal carcinoma of left breast    Postmenopausal    Family history of ovarian cancer    Family history of lung cancer    Subclinical hypothyroidism    Immunodeficiency due to chemotherapy    Encounter for chemotherapy  management    Immunotherapy encounter    Dermatitis, drug-induced    Anemia due to chemotherapy    Hypomagnesemia    Vertigo    Chemotherapy management, encounter for    Immunosuppressed due to chemotherapy     Other Visit Diagnoses         Protein-calorie malnutrition, unspecified severity              Orders for 07/10/2025:   Continue chemotherapy  CBC and CMP weekly   TSH and free T4 every 3 weeks   Re-stage with bilateral breast MRI with and without contrast after completion of 8 cycles of chemotherapy  -07/10/2025:  Infuse magnesium sulfate 2 g IV per pharmacy protocol; start magnesium oxide 400 mg p.o. b.i.d. x2 weeks; no refills; in 2 weeks, recheck CMP and magnesium level  For neuropathy, continue Cymbalta  Check retic count, serum iron, TIBC, ferritin, B12 level, folic acid level, LDH, haptoglobin  Genetic testing  -07/10/2025: Occasional dizziness; vertigo x1 episode; brain MRI with contrast rule out brain metastases  -discontinue gabapentin; resume Cymbalta 60 mg p.o. q.h.s. for neuropathy  -please have radiology report on restaging breast MRI  Follow-up with NP in 2 weeks for toxicity check.    Above discussed with the patient.  All questions answered.    Discussed labs and scans and gave her copies of relevant results.  She understands and agrees with this plan.  ====================================    #IDC left breast, triple negative:  -screening mammogram 12/13/2023: BI-RADS 0  -diagnostic mammogram and ultrasound 01/27/2025:  BI-RADS: 5  -core biopsy 01/27/2025:  IDC, overall grade 3, triple negative  -ER negative (0%), UT negative (0%), HER2 negative (score 0), Ki-67 high proliferation (65%)  -radiologically, per mammogram and ultrasound 01/27/2025:  23 x 17 x 22 mm mass; no lymphadenopathy  -02/24/2025: Initial consultation: ECOG 0  -pre chemotherapy TTE 03/05/2025:  LVEF 55-60%  -staging CTs C/A/P and bone scan 03/06/2025: No distant metastases  -breast MRI 03/13/2025:  Left breast lesion 2.4 x  2.2 x 2.9 cm  -left IJ MediPort placed 03/24/2025  -left breast tumor 23 mm per mammogram and ultrasound, 2.9 cm per MRI, triple negative; G3  -cT2 cN0 MX, AJCC anatomic stage at least IIA, clinical prognostic stage at least IIB  -S/P neoadjuvant carboplatin/Taxol/Keytruda every 3 weeks x4 cycles:  04/17/2025-07/03/2025)  -Keytruda held C4 D1 due to dermatologic toxicity  -for peripheral neuropathy, Cymbalta started 05/29/2025  -07/08/2025: Restaging breast MRI with and without contrast: Report pending  -no severe cytopenias with chemotherapy except for progressive drop in hemoglobin:  13.8 on 04/17/2025, down to 8.9 on 07/03/2025 (secondary to myelosuppression with chemotherapy)  >>>  Plan:  Postmenopausal  Triple negative breast cancers; ordered genetic testing and counseling 02/24/2025  Also, family history of ovarian cancer in sister; ordered genetic testing  By definition, operable breast cancer  For TNBC, cT2, preoperative systemic therapy is preferred  In any case, long-term outcomes are the same when patients are given chemotherapy preoperatively compared with postoperatively  -has had 4 cycles of neoadjuvant chemotherapy; restaging MRI breast report is pending  -if no progression, then, proceed with neoadjuvant Keytruda/Adriamycin/Cytoxan every 3 weeks x4 cycles  -check CBC and CMP weekly; check TSH and free T4 every 3 weeks to monitor for the possibility of immune thyroiditis with the Keytruda  -re-stage with breast MRI with and without contrast after completion of 8 cycles of neoadjuvant chemotherapy    -07/10/2025: Occasional dizziness; vertigo x1 episode; brain MRI with contrast rule out brain metastases  -discontinue gabapentin; resume Cymbalta 60 mg p.o. q.h.s. for neuropathy  -please have radiology report on restaging breast MRI    -07/10/2025:  Pruritic erythematous rash over both arms and to some extent, over knees; not acutely symptomatic; this is Keytruda induced immune dermatitis; advised to  apply calamine local induration or any other soothing lotions; no need to start steroids at this time; if rash worsens, then, we will need to start systemic steroids    -07/10/2025: WBC 3.7, hemoglobin 9.0, MCV 98.2, platelets normal, differential count normal, ANC 2.38; CMP reviewed; magnesium 1.3 low, potassium 4.5 normal  >>>  -07/10/2025:  Infuse magnesium sulfate 2 g IV per pharmacy protocol; start magnesium oxide 400 mg p.o. b.i.d. x2 weeks; no refills; in 2 weeks, recheck CMP and magnesium level    Discussion:  Clinical prognostic stage at least IIB, therefore, plan:  preoperative chemotherapy followed by adjuvant chemotherapy:    Preoperative pembrolizumab/carboplatin/Taxol, followed by   preoperative pembrolizumab/Cyclophosphamide/doxorubicin or epirubicin, followed by  adjuvant pembrolizumab (category 1 recommendation)  (see below)  -If residual disease after preoperative therapy with taxane/alkylator/anthracycline based chemotherapy, then, capecitabine (patients in the Giacomo trial did not receive capecitabine, therefore, there are no data on sequencing or to guide selection of one agent over the other, i.e., capecitabine versus olaparib if BRCA1/2 mutation positive)  -If germline BRCA1/2 mutation positive, then, adjuvant olaparib if residual disease after neoadjuvant chemotherapy  (patients in the Giacomo trial did not receive capecitabine, therefore, there are no data on sequencing or to guide selection of one agent over the other)    Preoperative systemic therapy  Pembrolizumab 200 mg IV day 1  Paclitaxel 80 mg/m² IV days 1, 8, 15  Carboplatin AUC 5 IV day 1  (Cycle every 21 days x 4 cycles) (cycles 1-4)  >>>  Followed by:  -Pembrolizumab 200 mg IV day 1  -Doxorubicin 60 mg/m² IV day 1  -Cyclophosphamide 600 mg/m² IV day 1  (Cycle every 21 days for 4 cycles (cycles 5-8)  Followed by:  Adjuvant:  Pembrolizumab 200 mg IV day 1  Cycle every 21 days x 9 cycles    Response assessment to preoperative  systemic therapy:  Physical examination, mammogram, and/or breast ultrasound and/or breast MRI  MRI is more accurate than mammography for assessing tumor response to preoperative therapy    After preoperative systemic therapy, if BCS and axillary staging, then:  Adjuvant systemic therapy +whole breast radiotherapy    After preoperative systemic therapy, if mastectomy and axillary staging, then:  1. Adjuvant systemic therapy +postmastectomy radiotherapy  -if any ypN+: PMRT to chest wall +comprehensive RNI with inclusion of any portion of the undetected axilla at risk  -if cN0, ypN0, then:  If axilla was assessed by SLNB or axillary dissection, then, no PMRT    Adjuvant systemic therapy after preoperative systemic therapy:  -consider adjuvant bisphosphonate therapy for risk reduction of distant metastases for 3-5 years in postmenopausal patients with a high-risk node-negative or node positive tumors; we will consider in this patient's; she will need dental evaluation and preventive Dentistry prior, in order to prevent/minimize likelihood of osteonecrosis of the jaw  1. If ypT0N0 or PCR, then:  Adjuvant pembrolizumab (if pembrolizumab containing regimen was given preoperatively)    2. If ypT1-4, N0 or ypN=/>1, then:  -adjuvant pembrolizumab; and/or  -adjuvant capecitabine (6-8 cycles); and/or  -adjuvant olaparib x1 year if germline BRCA1/2 mutation positive (category 1 recommendation)    # Anemia due to chemotherapy-induced myelosuppression:  -no severe cytopenias with chemotherapy accept progressive drop in hemoglobin:  13.8 on 04/17/2025, down to 8.9 on 07/03/2025 (secondary to myelosuppression with chemotherapy)  -07/10/2025: WBC 3.7, hemoglobin 9.0, MCV 98.2, platelets normal, differential count normal, ANC 2.38; CMP reviewed; magnesium 1.3 low, potassium 4.5 normal  >>>  -check retic count, serum iron, TIBC, ferritin, B12 level, folic acid level, test    # Chemotherapy-induced peripheral neuropathy (Taxol):  -for  peripheral neuropathy, Cymbalta started 05/29/2025  >>>  -continue Cymbalta; monitor symptoms    #Family history of lung cancer and ovarian cancer:  Father experienced lung cancer at age 84 (was a smoker); sister experienced ovarian cancer at age 54; maternal aunt experienced unknown kind of cancer in her 60s  >>>  -have ordered genetic testing and counseling  -had genetics consultation done on 07/07/2025    #Obesity:  -12/12/2024: BMI 37.2  -02/13/2025: BMI 39.4    History of dyslipidemia, bilateral tinnitus, sensorineural hearing loss, psoriatic arthritis       Follow-up:  No follow-ups on file.  Answers submitted by the patient for this visit:  Review of Systems Questionnaire (Submitted on 7/8/2025)  appetite change : No  unexpected weight change: No  mouth sores: No  visual disturbance: Yes  cough: No  shortness of breath: Yes  chest pain: No  abdominal pain: No  diarrhea: No  frequency: No  back pain: No  rash: No  headaches: No  adenopathy: No  nervous/ anxious: No         [1]   Current Outpatient Medications on File Prior to Visit   Medication Sig Dispense Refill    dexAMETHasone (DECADRON) 4 MG Tab TAKE 2 TABLETS BY MOUTH ONCE DAILY ON DAYS 2, 3, AND 4 FOLLOWING CHEMOTHERAPY 24 tablet 2    gabapentin (NEURONTIN) 100 MG capsule Take 1 capsule (100 mg total) by mouth 3 (three) times daily. 90 each 11    multivitamin (THERAGRAN) per tablet Take 1 tablet by mouth 3 (three) times daily.      OLANZapine (ZYPREXA) 5 MG tablet Take 5 mg by mouth once daily.      omega-3 acid ethyl esters (LOVAZA) 1 gram capsule Take 2 g by mouth 3 (three) times daily.      pantoprazole (PROTONIX) 20 MG tablet Take 1 tablet (20 mg total) by mouth once daily. 30 tablet 1    vitamin D (VITAMIN D3) 1000 units Tab Take 1,000 Units by mouth 2 (two) times a day.      DULoxetine (CYMBALTA) 30 MG capsule Take 1 capsule (30 mg total) by mouth once daily for 7 days, THEN 2 capsules (60 mg total) once daily for 23 days. 53 capsule 0     triamcinolone acetonide 0.1% (KENALOG) 0.1 % cream Apply topically 3 (three) times daily. for 10 days 80 g 1     No current facility-administered medications on file prior to visit.

## 2025-07-10 NOTE — Clinical Note
-07/10/2025: Occasional dizziness; vertigo x1 episode; brain MRI with contrast rule out brain metastases -discontinue gabapentin; resume Cymbalta 60 mg p.o. q.h.s. for neuropathy -please have radiology report on restaging breast MRI

## 2025-07-11 ENCOUNTER — INFUSION (OUTPATIENT)
Dept: INFUSION THERAPY | Facility: HOSPITAL | Age: 65
End: 2025-07-11
Attending: INTERNAL MEDICINE
Payer: MEDICARE

## 2025-07-11 VITALS
RESPIRATION RATE: 20 BRPM | OXYGEN SATURATION: 99 % | WEIGHT: 218.06 LBS | HEIGHT: 62 IN | HEART RATE: 96 BPM | DIASTOLIC BLOOD PRESSURE: 72 MMHG | BODY MASS INDEX: 40.13 KG/M2 | SYSTOLIC BLOOD PRESSURE: 113 MMHG | TEMPERATURE: 98 F

## 2025-07-11 DIAGNOSIS — C50.912 INVASIVE DUCTAL CARCINOMA OF LEFT BREAST: Primary | ICD-10-CM

## 2025-07-11 PROCEDURE — 96372 THER/PROPH/DIAG INJ SC/IM: CPT

## 2025-07-14 ENCOUNTER — DOCUMENTATION ONLY (OUTPATIENT)
Dept: HEMATOLOGY/ONCOLOGY | Facility: CLINIC | Age: 65
End: 2025-07-14
Payer: MEDICARE

## 2025-07-14 ENCOUNTER — TELEPHONE (OUTPATIENT)
Dept: HEMATOLOGY/ONCOLOGY | Facility: CLINIC | Age: 65
End: 2025-07-14
Payer: MEDICARE

## 2025-07-14 NOTE — PROGRESS NOTES
Significant response on restaging breast MRI   Please inform patient.    Thank you very much.  =================    IMPRESSION: KNOWN BIOPSY PROVEN MALIGNANCY   1.  Left breast 2:00 posterior depth 1.3 cm x 1.5 cm x 1.4 cm irregular heterogeneously enhancing mass with irregular margins and associated metallic clip (T1 barrel clip), consistent with the known biopsy-proven left breast invasive malignancy, has significantly decreased in size and degree of enhancement compared to previous breast MRI performed prior to initiation of chemotherapy on 3/12/2025.  Findings are consistent with a partial response to neoadjuvant chemotherapy.  2.  No other abnormal enhancement in either breast.  No significant axillary or internal mammary adenopathy.     RECOMMENDATIONS:  Appropriate action should be taken for known biopsy-proven left breast malignancy.       The patient is in the care of medical oncologist, Dr. Fox, for management of known biopsy-proven left breast malignancy.  She will receive Breast MRI results and continued management recommendations from Dr. Fox.        Sabra Sim M.D., md/:7/11/2025 13:52:00          letter sent: BIRADS 6    MRI BI-RADS: 6 Known biopsy proven malignancy      Exam Ended: 07/11/25 13:52 CDT Last Resulted: 07/11/25 13:52 CDT

## 2025-07-16 ENCOUNTER — TELEPHONE (OUTPATIENT)
Dept: HEMATOLOGY/ONCOLOGY | Facility: CLINIC | Age: 65
End: 2025-07-16
Payer: MEDICARE

## 2025-07-16 DIAGNOSIS — C50.919 TRIPLE NEGATIVE BREAST CANCER: Primary | ICD-10-CM

## 2025-07-16 DIAGNOSIS — Z17.421 TRIPLE NEGATIVE BREAST CANCER: Primary | ICD-10-CM

## 2025-07-16 DIAGNOSIS — C50.912 INVASIVE DUCTAL CARCINOMA OF LEFT BREAST: ICD-10-CM

## 2025-07-17 ENCOUNTER — HOSPITAL ENCOUNTER (INPATIENT)
Facility: HOSPITAL | Age: 65
LOS: 3 days | Discharge: HOME | DRG: 871 | End: 2025-07-20
Attending: EMERGENCY MEDICINE | Admitting: INTERNAL MEDICINE
Payer: MEDICARE

## 2025-07-17 DIAGNOSIS — D61.811 DRUG-INDUCED PANCYTOPENIA: Primary | ICD-10-CM

## 2025-07-17 DIAGNOSIS — R50.81 NEUTROPENIC FEVER: ICD-10-CM

## 2025-07-17 DIAGNOSIS — C50.912 INVASIVE DUCTAL CARCINOMA OF LEFT BREAST: ICD-10-CM

## 2025-07-17 DIAGNOSIS — D70.9 NEUTROPENIC FEVER: ICD-10-CM

## 2025-07-17 DIAGNOSIS — D84.821 IMMUNODEFICIENCY DUE TO CHEMOTHERAPY: ICD-10-CM

## 2025-07-17 DIAGNOSIS — A41.9 SEPSIS, DUE TO UNSPECIFIED ORGANISM, UNSPECIFIED WHETHER ACUTE ORGAN DYSFUNCTION PRESENT: ICD-10-CM

## 2025-07-17 DIAGNOSIS — D70.9 FEBRILE NEUTROPENIA: ICD-10-CM

## 2025-07-17 DIAGNOSIS — R07.9 CHEST PAIN: ICD-10-CM

## 2025-07-17 DIAGNOSIS — Z79.69 IMMUNODEFICIENCY DUE TO CHEMOTHERAPY: ICD-10-CM

## 2025-07-17 DIAGNOSIS — L40.50 PSORIATIC ARTHRITIS: Chronic | ICD-10-CM

## 2025-07-17 DIAGNOSIS — T45.1X5A IMMUNODEFICIENCY DUE TO CHEMOTHERAPY: ICD-10-CM

## 2025-07-17 DIAGNOSIS — R50.81 FEBRILE NEUTROPENIA: ICD-10-CM

## 2025-07-17 LAB
ABO + RH BLD: NORMAL
ABO + RH BLD: NORMAL
ABORH RETYPE: NORMAL
ABS NEUT (OLG): 0.01 X10(3)/MCL (ref 2.1–9.2)
ALBUMIN SERPL-MCNC: 2.7 G/DL (ref 3.4–4.8)
ALBUMIN/GLOB SERPL: 0.7 RATIO (ref 1.1–2)
ALP SERPL-CCNC: 131 UNIT/L (ref 40–150)
ALT SERPL-CCNC: 18 UNIT/L (ref 0–55)
ANION GAP SERPL CALC-SCNC: 12 MEQ/L
ANISOCYTOSIS BLD QL SMEAR: ABNORMAL
AST SERPL-CCNC: 12 UNIT/L (ref 11–45)
BACTERIA #/AREA URNS AUTO: ABNORMAL /HPF
BILIRUB SERPL-MCNC: 0.8 MG/DL
BILIRUB UR QL STRIP.AUTO: NEGATIVE
BLD PROD TYP BPU: NORMAL
BLD PROD TYP BPU: NORMAL
BLOOD UNIT EXPIRATION DATE: NORMAL
BLOOD UNIT EXPIRATION DATE: NORMAL
BLOOD UNIT TYPE CODE: 7300
BLOOD UNIT TYPE CODE: 7300
BUN SERPL-MCNC: 22.7 MG/DL (ref 9.8–20.1)
CALCIUM SERPL-MCNC: 8.8 MG/DL (ref 8.4–10.2)
CHLORIDE SERPL-SCNC: 100 MMOL/L (ref 98–107)
CLARITY UR: ABNORMAL
CO2 SERPL-SCNC: 24 MMOL/L (ref 23–31)
COLOR UR AUTO: YELLOW
CREAT SERPL-MCNC: 1.23 MG/DL (ref 0.55–1.02)
CREAT/UREA NIT SERPL: 18
CROSSMATCH INTERPRETATION: NORMAL
CROSSMATCH INTERPRETATION: NORMAL
DISPENSE STATUS: NORMAL
DISPENSE STATUS: NORMAL
ERYTHROCYTE [DISTWIDTH] IN BLOOD BY AUTOMATED COUNT: 17.2 % (ref 11.5–17)
GFR SERPLBLD CREATININE-BSD FMLA CKD-EPI: 49 ML/MIN/1.73/M2
GLOBULIN SER-MCNC: 3.7 GM/DL (ref 2.4–3.5)
GLUCOSE SERPL-MCNC: 148 MG/DL (ref 82–115)
GLUCOSE UR QL STRIP: NORMAL
GROUP & RH: NORMAL
HCT VFR BLD AUTO: 20.4 % (ref 37–47)
HGB BLD-MCNC: 6.6 G/DL (ref 12–16)
HGB UR QL STRIP: ABNORMAL
INDIRECT COOMBS: NORMAL
INSTRUMENT WBC (OLG): 0.21 X10(3)/MCL
KETONES UR QL STRIP: NEGATIVE
LACTATE SERPL-SCNC: 2.7 MMOL/L (ref 0.5–2.2)
LACTATE SERPL-SCNC: 2.7 MMOL/L (ref 0.5–2.2)
LEUKOCYTE ESTERASE UR QL STRIP: NEGATIVE
LYMPHOCYTES NFR BLD MANUAL: 0.2 X10(3)/MCL (ref 0.6–4.6)
LYMPHOCYTES NFR BLD MANUAL: 95 %
MACROCYTES BLD QL SMEAR: ABNORMAL
MAGNESIUM SERPL-MCNC: 1.4 MG/DL (ref 1.6–2.6)
MCH RBC QN AUTO: 32.2 PG (ref 27–31)
MCHC RBC AUTO-ENTMCNC: 32.4 G/DL (ref 33–36)
MCV RBC AUTO: 99.5 FL (ref 80–94)
MICROCYTES BLD QL SMEAR: ABNORMAL
MONOCYTES NFR BLD MANUAL: 0 %
MONOCYTES NFR BLD MANUAL: 0 X10(3)/MCL (ref 0.1–1.3)
MUCOUS THREADS URNS QL MICRO: ABNORMAL /LPF
NEUTROPHILS NFR BLD MANUAL: 5 %
NITRITE UR QL STRIP: ABNORMAL
PH UR STRIP: 6 [PH]
PLATELET # BLD AUTO: 68 X10(3)/MCL (ref 130–400)
PLATELET # BLD EST: ABNORMAL 10*3/UL
PMV BLD AUTO: 11.8 FL (ref 7.4–10.4)
POTASSIUM SERPL-SCNC: 4.2 MMOL/L (ref 3.5–5.1)
PROT SERPL-MCNC: 6.4 GM/DL (ref 5.8–7.6)
PROT UR QL STRIP: ABNORMAL
RBC # BLD AUTO: 2.05 X10(6)/MCL (ref 4.2–5.4)
RBC #/AREA URNS AUTO: ABNORMAL /HPF
RBC MORPH BLD: ABNORMAL
SODIUM SERPL-SCNC: 136 MMOL/L (ref 136–145)
SP GR UR STRIP.AUTO: 1.01 (ref 1–1.03)
SPECIMEN OUTDATE: NORMAL
SQUAMOUS #/AREA URNS LPF: ABNORMAL /HPF
TOXIC GRANULES BLD QL SMEAR: ABNORMAL
TROPONIN I SERPL-MCNC: <0.01 NG/ML (ref 0–0.04)
UNIT NUMBER: NORMAL
UNIT NUMBER: NORMAL
UROBILINOGEN UR STRIP-ACNC: NORMAL
WBC # BLD AUTO: 0.21 X10(3)/MCL (ref 4.5–11.5)
WBC #/AREA URNS AUTO: ABNORMAL /HPF
WBC VACUOLES (OHS): ABNORMAL

## 2025-07-17 PROCEDURE — 63600175 PHARM REV CODE 636 W HCPCS

## 2025-07-17 PROCEDURE — 81015 MICROSCOPIC EXAM OF URINE: CPT | Performed by: EMERGENCY MEDICINE

## 2025-07-17 PROCEDURE — 99291 CRITICAL CARE FIRST HOUR: CPT

## 2025-07-17 PROCEDURE — 96361 HYDRATE IV INFUSION ADD-ON: CPT

## 2025-07-17 PROCEDURE — 36430 TRANSFUSION BLD/BLD COMPNT: CPT | Performed by: EMERGENCY MEDICINE

## 2025-07-17 PROCEDURE — 11000001 HC ACUTE MED/SURG PRIVATE ROOM

## 2025-07-17 PROCEDURE — 83605 ASSAY OF LACTIC ACID: CPT | Performed by: EMERGENCY MEDICINE

## 2025-07-17 PROCEDURE — 86923 COMPATIBILITY TEST ELECTRIC: CPT | Mod: 91 | Performed by: EMERGENCY MEDICINE

## 2025-07-17 PROCEDURE — 83735 ASSAY OF MAGNESIUM: CPT | Performed by: EMERGENCY MEDICINE

## 2025-07-17 PROCEDURE — 93005 ELECTROCARDIOGRAM TRACING: CPT

## 2025-07-17 PROCEDURE — 21400001 HC TELEMETRY ROOM

## 2025-07-17 PROCEDURE — 25000003 PHARM REV CODE 250: Performed by: EMERGENCY MEDICINE

## 2025-07-17 PROCEDURE — 84484 ASSAY OF TROPONIN QUANT: CPT | Performed by: EMERGENCY MEDICINE

## 2025-07-17 PROCEDURE — 25000003 PHARM REV CODE 250: Performed by: INTERNAL MEDICINE

## 2025-07-17 PROCEDURE — 96374 THER/PROPH/DIAG INJ IV PUSH: CPT | Mod: 59

## 2025-07-17 PROCEDURE — 93010 ELECTROCARDIOGRAM REPORT: CPT | Mod: ,,, | Performed by: INTERNAL MEDICINE

## 2025-07-17 PROCEDURE — 99223 1ST HOSP IP/OBS HIGH 75: CPT | Mod: ,,, | Performed by: INTERNAL MEDICINE

## 2025-07-17 PROCEDURE — 96375 TX/PRO/DX INJ NEW DRUG ADDON: CPT

## 2025-07-17 PROCEDURE — 87040 BLOOD CULTURE FOR BACTERIA: CPT | Performed by: INTERNAL MEDICINE

## 2025-07-17 PROCEDURE — 84484 ASSAY OF TROPONIN QUANT: CPT

## 2025-07-17 PROCEDURE — 86850 RBC ANTIBODY SCREEN: CPT | Performed by: EMERGENCY MEDICINE

## 2025-07-17 PROCEDURE — 96365 THER/PROPH/DIAG IV INF INIT: CPT

## 2025-07-17 PROCEDURE — 63600175 PHARM REV CODE 636 W HCPCS: Performed by: INTERNAL MEDICINE

## 2025-07-17 PROCEDURE — 63600175 PHARM REV CODE 636 W HCPCS: Performed by: EMERGENCY MEDICINE

## 2025-07-17 PROCEDURE — 85025 COMPLETE CBC W/AUTO DIFF WBC: CPT | Performed by: EMERGENCY MEDICINE

## 2025-07-17 PROCEDURE — 80053 COMPREHEN METABOLIC PANEL: CPT | Performed by: EMERGENCY MEDICINE

## 2025-07-17 PROCEDURE — 27000207 HC ISOLATION

## 2025-07-17 PROCEDURE — 30233N1 TRANSFUSION OF NONAUTOLOGOUS RED BLOOD CELLS INTO PERIPHERAL VEIN, PERCUTANEOUS APPROACH: ICD-10-PCS | Performed by: EMERGENCY MEDICINE

## 2025-07-17 PROCEDURE — P9016 RBC LEUKOCYTES REDUCED: HCPCS | Performed by: EMERGENCY MEDICINE

## 2025-07-17 RX ORDER — CEFEPIME HYDROCHLORIDE 2 G/1
2 INJECTION, POWDER, FOR SOLUTION INTRAVENOUS
Status: DISCONTINUED | OUTPATIENT
Start: 2025-07-17 | End: 2025-07-18

## 2025-07-17 RX ORDER — CEFEPIME HYDROCHLORIDE 2 G/1
2 INJECTION, POWDER, FOR SOLUTION INTRAVENOUS
Status: COMPLETED | OUTPATIENT
Start: 2025-07-17 | End: 2025-07-17

## 2025-07-17 RX ORDER — ONDANSETRON HYDROCHLORIDE 2 MG/ML
4 INJECTION, SOLUTION INTRAVENOUS EVERY 4 HOURS PRN
Status: DISCONTINUED | OUTPATIENT
Start: 2025-07-17 | End: 2025-07-20 | Stop reason: HOSPADM

## 2025-07-17 RX ORDER — IBUPROFEN 200 MG
16 TABLET ORAL
Status: DISCONTINUED | OUTPATIENT
Start: 2025-07-17 | End: 2025-07-20 | Stop reason: HOSPADM

## 2025-07-17 RX ORDER — ONDANSETRON HYDROCHLORIDE 2 MG/ML
INJECTION, SOLUTION INTRAVENOUS
Status: DISPENSED
Start: 2025-07-17 | End: 2025-07-17

## 2025-07-17 RX ORDER — ONDANSETRON HYDROCHLORIDE 2 MG/ML
4 INJECTION, SOLUTION INTRAVENOUS
Status: COMPLETED | OUTPATIENT
Start: 2025-07-17 | End: 2025-07-17

## 2025-07-17 RX ORDER — HYDROCODONE BITARTRATE AND ACETAMINOPHEN 500; 5 MG/1; MG/1
TABLET ORAL
Status: DISCONTINUED | OUTPATIENT
Start: 2025-07-17 | End: 2025-07-20 | Stop reason: HOSPADM

## 2025-07-17 RX ORDER — GABAPENTIN 100 MG/1
100 CAPSULE ORAL 3 TIMES DAILY
Status: DISCONTINUED | OUTPATIENT
Start: 2025-07-17 | End: 2025-07-20 | Stop reason: HOSPADM

## 2025-07-17 RX ORDER — MUPIROCIN 20 MG/G
OINTMENT TOPICAL 2 TIMES DAILY
Status: DISCONTINUED | OUTPATIENT
Start: 2025-07-17 | End: 2025-07-20 | Stop reason: HOSPADM

## 2025-07-17 RX ORDER — OLANZAPINE 5 MG/1
5 TABLET, FILM COATED ORAL DAILY
Status: DISCONTINUED | OUTPATIENT
Start: 2025-07-18 | End: 2025-07-20 | Stop reason: HOSPADM

## 2025-07-17 RX ORDER — ACETAMINOPHEN 500 MG
1000 TABLET ORAL EVERY 6 HOURS PRN
Status: DISCONTINUED | OUTPATIENT
Start: 2025-07-17 | End: 2025-07-20 | Stop reason: HOSPADM

## 2025-07-17 RX ORDER — GLUCAGON 1 MG
1 KIT INJECTION
Status: DISCONTINUED | OUTPATIENT
Start: 2025-07-17 | End: 2025-07-20 | Stop reason: HOSPADM

## 2025-07-17 RX ORDER — PANTOPRAZOLE SODIUM 20 MG/1
20 TABLET, DELAYED RELEASE ORAL DAILY
Status: DISCONTINUED | OUTPATIENT
Start: 2025-07-18 | End: 2025-07-20 | Stop reason: HOSPADM

## 2025-07-17 RX ORDER — SODIUM CHLORIDE 0.9 % (FLUSH) 0.9 %
10 SYRINGE (ML) INJECTION
Status: DISCONTINUED | OUTPATIENT
Start: 2025-07-17 | End: 2025-07-20 | Stop reason: HOSPADM

## 2025-07-17 RX ORDER — ACETAMINOPHEN 325 MG/1
650 TABLET ORAL EVERY 4 HOURS PRN
Status: DISCONTINUED | OUTPATIENT
Start: 2025-07-17 | End: 2025-07-20 | Stop reason: HOSPADM

## 2025-07-17 RX ORDER — MAGNESIUM SULFATE HEPTAHYDRATE 40 MG/ML
2 INJECTION, SOLUTION INTRAVENOUS ONCE
Status: COMPLETED | OUTPATIENT
Start: 2025-07-17 | End: 2025-07-17

## 2025-07-17 RX ORDER — MAGNESIUM SULFATE HEPTAHYDRATE 40 MG/ML
2 INJECTION, SOLUTION INTRAVENOUS ONCE
Status: COMPLETED | OUTPATIENT
Start: 2025-07-17 | End: 2025-07-18

## 2025-07-17 RX ORDER — IBUPROFEN 200 MG
24 TABLET ORAL
Status: DISCONTINUED | OUTPATIENT
Start: 2025-07-17 | End: 2025-07-20 | Stop reason: HOSPADM

## 2025-07-17 RX ORDER — NALOXONE HCL 0.4 MG/ML
0.02 VIAL (ML) INJECTION
Status: DISCONTINUED | OUTPATIENT
Start: 2025-07-17 | End: 2025-07-20 | Stop reason: HOSPADM

## 2025-07-17 RX ORDER — PROCHLORPERAZINE EDISYLATE 5 MG/ML
5 INJECTION INTRAMUSCULAR; INTRAVENOUS EVERY 6 HOURS PRN
Status: DISCONTINUED | OUTPATIENT
Start: 2025-07-17 | End: 2025-07-20 | Stop reason: HOSPADM

## 2025-07-17 RX ORDER — DULOXETIN HYDROCHLORIDE 30 MG/1
60 CAPSULE, DELAYED RELEASE ORAL DAILY
Status: DISCONTINUED | OUTPATIENT
Start: 2025-07-18 | End: 2025-07-20 | Stop reason: HOSPADM

## 2025-07-17 RX ADMIN — GABAPENTIN 100 MG: 100 CAPSULE ORAL at 10:07

## 2025-07-17 RX ADMIN — VANCOMYCIN HYDROCHLORIDE 1000 MG: 1 INJECTION, POWDER, LYOPHILIZED, FOR SOLUTION INTRAVENOUS at 01:07

## 2025-07-17 RX ADMIN — CEFEPIME 2 G: 2 INJECTION, POWDER, FOR SOLUTION INTRAVENOUS at 06:07

## 2025-07-17 RX ADMIN — CEFEPIME 2 G: 2 INJECTION, POWDER, FOR SOLUTION INTRAVENOUS at 11:07

## 2025-07-17 RX ADMIN — MUPIROCIN: 20 OINTMENT TOPICAL at 10:07

## 2025-07-17 RX ADMIN — SODIUM CHLORIDE, POTASSIUM CHLORIDE, SODIUM LACTATE AND CALCIUM CHLORIDE 2967 ML: 600; 310; 30; 20 INJECTION, SOLUTION INTRAVENOUS at 10:07

## 2025-07-17 RX ADMIN — VANCOMYCIN HYDROCHLORIDE 1000 MG: 1 INJECTION, POWDER, LYOPHILIZED, FOR SOLUTION INTRAVENOUS at 11:07

## 2025-07-17 RX ADMIN — ONDANSETRON 4 MG: 2 INJECTION INTRAMUSCULAR; INTRAVENOUS at 11:07

## 2025-07-17 RX ADMIN — MAGNESIUM SULFATE HEPTAHYDRATE 2 G: 40 INJECTION, SOLUTION INTRAVENOUS at 06:07

## 2025-07-17 RX ADMIN — MAGNESIUM SULFATE HEPTAHYDRATE 2 G: 40 INJECTION, SOLUTION INTRAVENOUS at 10:07

## 2025-07-17 RX ADMIN — PROCHLORPERAZINE EDISYLATE 5 MG: 5 INJECTION INTRAMUSCULAR; INTRAVENOUS at 02:07

## 2025-07-17 NOTE — ED PROVIDER NOTES
Encounter Date: 7/17/2025       History     Chief Complaint   Patient presents with    Fall     GLF. C/o weakness & dizziness since last night. C/o head pain. -BT. On chemo for breast cancer. GCS 15       Patient with a history of breast cancer who is currently on chemotherapy who states that she has had a 1 day history of nausea vomiting and diarrhea.  No fever no chills no runny nose cough congestion.  No blood in vomitus or stool.  Was globally weak so EMS was called was found to be mildly hypotensive.  No history of previous no recent hospitalizations.  Sick contacts    The history is provided by the patient.     Review of patient's allergies indicates:  No Known Allergies  Past Medical History:   Diagnosis Date    Breast cancer     left    Hyperlipidemia     Menopause 4/27/20020     Past Surgical History:   Procedure Laterality Date    ADENOIDECTOMY      CHOLECYSTECTOMY  1998    COLONOSCOPY      with biopsy    INSERTION OF TUNNELED CENTRAL VENOUS CATHETER (CVC) WITH SUBCUTANEOUS PORT N/A 3/24/2025    Procedure: [id number]-F-CATH;  Surgeon: Vik Lynch Jr., MD;  Location: [facility];  Service: General;  Laterality: N/A;  Likely right    OCCLUSION, FALLOPIAN TUBE, USING DEVICE, BY VAGINAL OR SUPRAPUBIC APPROACH Bilateral     TONSILLECTOMY  1975     Family History   Problem Relation Name Age of Onset    Heart disease Mother Zari Vinay     Diabetes Mother Zari Vinay     Alzheimer's disease Mother Zari Vinay     Stroke Mother Zari Vinay     Arthritis Mother Zari Vinay     Lung cancer Father HarrisEarl     Heart failure Father HarrisEarl     Ovarian cancer Sister      Diabetes Sister      Rashes / Skin problems Sister      Diabetes Brother Rakesh SLETTY     Epilepsy Brother Rakesh SLETTY     Diabetes Brother DOREEN FRAUSTO     Rashes / Skin problems Brother DOREEN FRAUSTO     Seizures Brother DOREEN FRAUSTO     Stroke Brother DOREEN FRAUSTO     Arthritis Brother Brando Frausto     Heart failure Maternal Grandmother  Mary     Diabetes Maternal Grandmother Mary     Osteoarthritis Maternal Aunt Valentina     Asbestos Paternal Uncle       Social History[1]  Review of Systems   Constitutional:  Positive for fatigue. Negative for fever.   HENT:  Negative for sore throat.    Respiratory:  Negative for shortness of breath.    Cardiovascular:  Negative for chest pain.   Gastrointestinal:  Positive for diarrhea, nausea and vomiting.   Genitourinary:  Negative for dysuria.        Urine has been concentrated   Musculoskeletal:  Negative for back pain.   Skin:  Negative for rash.   Neurological:  Negative for weakness.   Hematological:  Does not bruise/bleed easily.       Physical Exam     Initial Vitals [07/17/25 0917]   BP Pulse Resp Temp SpO2   (!) 89/40 (!) 120 18 99.2 °F (37.3 °C) 96 %      MAP       --         Physical Exam    Nursing note and vitals reviewed.  Constitutional: She appears well-developed and well-nourished. She is diaphoretic. She appears distressed.   Globally weak   HENT:   Head: Normocephalic and atraumatic. Mouth/Throat: Oropharynx is clear and moist.   Dry mucous membranes   Eyes: Conjunctivae are normal. Pupils are equal, round, and reactive to light.   Neck: Neck supple.   Normal range of motion.  Cardiovascular:  Normal rate, regular rhythm and normal heart sounds.           Tachycardic tachypneic   Pulmonary/Chest: Breath sounds normal. She has no wheezes. She has no rhonchi. She has no rales.   Abdominal: Abdomen is soft. Bowel sounds are normal.   Very mild global abdominal pain no rebound no guarding   Musculoskeletal:         General: Normal range of motion.      Cervical back: Normal range of motion and neck supple.     Neurological: She is alert and oriented to person, place, and time. GCS score is 15. GCS eye subscore is 4. GCS verbal subscore is 5. GCS motor subscore is 6.   Skin: Skin is warm. Capillary refill takes less than 2 seconds.   Psychiatric: She has a normal mood and affect. Her behavior  is normal. Judgment and thought content normal.         ED Course   Critical Care    Date/Time: 7/17/2025 1:39 PM    Performed by: Bran Argueta III, MD  Authorized by: Bran Argueta III, MD  Direct patient critical care time: 45 minutes  Documentation critical care time: 5 minutes  Consulting other physicians critical care time: 5 minutes  Total critical care time (exclusive of procedural time) : 55 minutes  Critical care was necessary to treat or prevent imminent or life-threatening deterioration of the following conditions: metabolic crisis and shock.  Critical care was time spent personally by me on the following activities: discussions with primary provider, discussions with consultants, examination of patient, re-evaluation of patient's condition, review of old charts, ordering and review of laboratory studies and ordering and performing treatments and interventions.        Labs Reviewed   COMPREHENSIVE METABOLIC PANEL - Abnormal       Result Value    Sodium 136      Potassium 4.2      Chloride 100      CO2 24      Glucose 148 (*)     Blood Urea Nitrogen 22.7 (*)     Creatinine 1.23 (*)     Calcium 8.8      Protein Total 6.4      Albumin 2.7 (*)     Globulin 3.7 (*)     Albumin/Globulin Ratio 0.7 (*)     Bilirubin Total 0.8            ALT 18      AST 12      eGFR 49      Anion Gap 12.0      BUN/Creatinine Ratio 18     LACTIC ACID, PLASMA - Abnormal    Lactic Acid Level 2.7 (*)    MAGNESIUM - Abnormal    Magnesium Level 1.40 (*)    CBC WITH DIFFERENTIAL - Abnormal    WBC 0.21 (*)     RBC 2.05 (*)     Hgb 6.6 (*)     Hct 20.4 (*)     MCV 99.5 (*)     MCH 32.2 (*)     MCHC 32.4 (*)     RDW 17.2 (*)     Platelet 68 (*)     MPV 11.8 (*)    LACTIC ACID, PLASMA - Abnormal    Lactic Acid Level 2.7 (*)    MANUAL DIFFERENTIAL - Abnormal    WBC 0.21      Neutrophils % 5      Lymphs % 95      Monocytes % 0      Neutrophils Abs 0.0105 (*)     Lymphs Abs 0.1995 (*)     Monocytes Abs 0 (*)     Platelets  Decreased (*)     RBC Morph Abnormal (*)     Anisocytosis 1+ (*)     Microcytosis 1+ (*)     Macrocytosis        Toxic Granulation        Vacuolated Grans       TROPONIN I - Normal    Troponin-I <0.010     CLOSTRIDIOIDES DIFFICILE TOXIN A AND B, EIA   CBC W/ AUTO DIFFERENTIAL    Narrative:     The following orders were created for panel order CBC Auto Differential.  Procedure                               Abnormality         Status                     ---------                               -----------         ------                     CBC with Differential[[id number]]       Abnormal            Final result               Manual Differential[[id number]]         Abnormal            Edited Result - FINAL        Please view results for these tests on the individual orders.   URINALYSIS, REFLEX TO URINE CULTURE   TYPE & SCREEN   ABORH RETYPE   PREPARE RBC SOFT          Imaging Results    None          Medications   vancomycin (VANCOCIN) 1,000 mg in D5W 250 mL IVPB (admixture device) (0 mg Intravenous Stopped 7/17/25 1326)     Followed by   vancomycin (VANCOCIN) 1,000 mg in D5W 250 mL IVPB (admixture device) (1,000 mg Intravenous New Bag 7/17/25 1337)   0.9%  NaCl infusion (for blood administration) (has no administration in time range)   lactated ringers bolus 2,967 mL (0 mLs Intravenous Stopped 7/17/25 1159)   ceFEPIme injection 2 g (2 g Intravenous Given 7/17/25 1140)   ondansetron injection 4 mg (4 mg Intravenous Given 7/17/25 1108)   ondansetron 4 mg/2 mL injection (4 mg  Given 7/17/25 1101)     Medical Decision Making  Differential diagnosis includes but not limited to colitis C diff    Patient CBC neutropenia patient H&H low hypotensive did respond to IV fluids patient treated as septic protocol given cefepime and vanc discussed case with Dr. Kwon oncology on-call will see in consult recommending transfuse recommending holding Neupogen or Neulasta as patient did get an injection within last 5 days.  Discussed  "case with Hospital Medicine will admit patient maintained normotensive state after initial IV fluid bolus    Alma Rosa Disla presents with sepsis with Acute heart failure secondary to Unknown.    Interventions include:    Antibiotics:         Fluid Resuscitation:   Ideal Body Weight- The patient is obese (BMI>30) and their ideal body weight of Ideal body weight: 50.1 kg (110 lb 7.2 oz) will be used to calculate fluid bolus of 30 ml/kg.     Labs and Imaging:   Lab             07/17/25 07/17/25                       0933          1301          LACTATE      2.7*         2.7*          No results found for: "CULTBLD"   Additional cultures were collected as indicated and imaging reviewed to identify infection source.    Hemodynamic Support and Monitoring:  Vasopressors were not needed     The patient was re-evaluated at Admission Date and Time: Obs date: N/A 7/17/25  1:31 PM and patient and/or surrogate was updated on plan of care.    The following services were consulted:Hospital Medicine     Problems Addressed:  Febrile neutropenia: complicated acute illness or injury    Amount and/or Complexity of Data Reviewed  Labs: ordered.    Risk  Prescription drug management.  Decision regarding hospitalization.               ED Course as of 07/17/25 1342   Thu Jul 17, 2025   0926 1 L IV fluids that was started by ambulance service [FK]   1250 Patient will be given septic protocol cefepime and vanc 30 cc/kilogram of fluid [FK]      ED Course User Index  [FK] Bran Argueta III, MD                               Clinical Impression:  Final diagnoses:  [A41.9] Sepsis, due to unspecified organism, unspecified whether acute organ dysfunction present  [D70.9, R50.81] Febrile neutropenia          ED Disposition Condition    Admit                       [1]   Social History  Tobacco Use    Smoking status: Never     Passive exposure: Never    Smokeless tobacco: Never   Substance Use Topics    Alcohol use: Not Currently     Comment: " I ONLY WINE MAYBE TWICE year    Drug use: Never        Bran Argueta III, MD  07/17/25 2078

## 2025-07-17 NOTE — PROGRESS NOTES
"Pharmacokinetic Initial Assessment: IV Vancomycin    Assessment/Plan:    Vancomycin 2000 mg was given in the emergency department.  Maintenance dose will be 1500 mg q24h.  Desired empiric serum trough concentration is 15 to 20 mcg/mL  Draw vancomycin trough on 7/19 at 1100  Pharmacy will continue to follow and monitor vancomycin.      Patient brief summary:  Alma Rosa Disla is a 65 y.o. female initiated on antimicrobial therapy with IV Vancomycin for treatment of suspected neutropenic fever    Drug Allergies:   Review of patient's allergies indicates:  No Known Allergies    Actual Body Weight:   98.9 kg    Renal Function:   Estimated Creatinine Clearance: 50.1 mL/min (A) (based on SCr of 1.23 mg/dL (H)).,     Dialysis Method (if applicable):  N/A    CBC (last 72 hours):  Recent Labs   Lab Result Units 07/17/25  0933   WBC x10(3)/mcL 0.21  0.21*   Hgb g/dL 6.6*   Hct % 20.4*   Platelet x10(3)/mcL 68*   Monocytes % % 0       Metabolic Panel (last 72 hours):  Recent Labs   Lab Result Units 07/17/25  0933 07/17/25  1357   Sodium mmol/L 136  --    Potassium mmol/L 4.2  --    Chloride mmol/L 100  --    CO2 mmol/L 24  --    Glucose mg/dL 148*  --    Glucose, UA   --  Normal   Blood Urea Nitrogen mg/dL 22.7*  --    Creatinine mg/dL 1.23*  --    Albumin g/dL 2.7*  --    Bilirubin Total mg/dL 0.8  --    ALP unit/L 131  --    AST unit/L 12  --    ALT unit/L 18  --    Magnesium Level mg/dL 1.40*  --        Drug levels (last 3 results):  No results for input(s): "VANCOMYCINRA", "VANCORANDOM", "VANCOMYCINPE", "VANCOPEAK", "VANCOMYCINTR", "VANCOTROUGH" in the last 72 hours.    Microbiologic Results:  Microbiology Results (last 7 days)       Procedure Component Value Units Date/Time    Blood Culture [[id number]] Collected: 07/17/25 1815    Order Status: Sent Specimen: Blood     Blood Culture [[id number]] Collected: 07/17/25 1807    Order Status: Sent Specimen: Blood     Stool Culture [[id number]]     Order Status: Sent Specimen: " Stool     Clostridium Diff Toxin, A & B, EIA [[id number]]     Order Status: Sent Specimen: Stool

## 2025-07-17 NOTE — H&P
[facility] Hospital Medicine History & Physical Examination       Patient Name: Alma Rosa Disla  MRN: [id number]  Patient Class: IP- Inpatient   Admission Date: 7/17/2025   Admitting Physician: JARAD Service   Length of Stay: 0  Attending Physician: Ever Joe MD  Primary Care Provider: Sweta Lanza FNP  Face-to-Face encounter date: 07/17/2025  Code Status:Full  Chief Complaint: Fall (GLF. C/o weakness & dizziness since last night. C/o head pain. -BT. On chemo for breast cancer. GCS 15/)      Screening for Social Drivers for health:  Patient screened for food insecurity, housing instability, transportation needs, utility difficulties, and interpersonal safety (select all that apply as identified as concern)  []Housing or Food  []Transportation Needs  []Utility Difficulties  []Interpersonal safety  [x]None      Patient information was obtained from patient, patient's family, past medical records and ER records.  ED records were reviewed in detail and documented below    HISTORY OF PRESENT ILLNESS:   Alma Rosa Disla is a 65 y.o. female who  has a past medical history of recent T2 N0 grade 3 triple negative breast carcinoma who was on treatment with (last therapy was on July 10th.  did receive growth factors; was treated with Adriamycin Cytoxan on July 10th), Hyperlipidemia.     The patient presented to [facility] on 7/17/2025 with a primary complaint of weakness.     Has had worsening feeling weakness and dizziness.  She fell prior to presentation and then she got significantly weak was brought into the emergency room. She denied any fever but was having chills and sweats.  She has had 2 loose stools.  No blood in his stool, globally weak, no more headaches or visual changes, no cough or shortness of breath.  Globally weak no focal weakness has chronic neuropathy has been on gabapentin in the past. No sick contacts at home.    She was found to be tachycardic hypotensive. Was started on  IV fluids vancomycin and cefepime    Admitted to  service. Oncology on consult.      PAST MEDICAL HISTORY:     Past Medical History:   Diagnosis Date    Breast cancer     left    Hyperlipidemia     Menopause 4/27/20020       PAST SURGICAL HISTORY:     Past Surgical History:   Procedure Laterality Date    ADENOIDECTOMY      CHOLECYSTECTOMY  1998    COLONOSCOPY      with biopsy    INSERTION OF TUNNELED CENTRAL VENOUS CATHETER (CVC) WITH SUBCUTANEOUS PORT N/A 3/24/2025    Procedure: [id number]-K-CATH;  Surgeon: Vik Lynch Jr., MD;  Location: [facility];  Service: General;  Laterality: N/A;  Likely right    OCCLUSION, FALLOPIAN TUBE, USING DEVICE, BY VAGINAL OR SUPRAPUBIC APPROACH Bilateral     TONSILLECTOMY  1975       ALLERGIES:   Patient has no known allergies.    FAMILY HISTORY:   Reviewed and negative    SOCIAL HISTORY:     Social History     Tobacco Use    Smoking status: Never     Passive exposure: Never    Smokeless tobacco: Never   Substance Use Topics    Alcohol use: Not Currently     Comment: I ONLY WINE MAYBE TWICE year        HOME MEDICATIONS:     Prior to Admission medications    Medication Sig Start Date End Date Taking? Authorizing Provider   dexAMETHasone (DECADRON) 4 MG Tab TAKE 2 TABLETS BY MOUTH ONCE DAILY ON DAYS 2, 3, AND 4 FOLLOWING CHEMOTHERAPY 7/1/25   Maximo Fox MD   DULoxetine (CYMBALTA) 30 MG capsule Take 1 capsule (30 mg total) by mouth once daily for 7 days, THEN 2 capsules (60 mg total) once daily for 23 days. 5/29/25 6/30/25  Arlene Noonan FNP   gabapentin (NEURONTIN) 100 MG capsule Take 1 capsule (100 mg total) by mouth 3 (three) times daily. 7/3/25 7/3/26  Cesia Madsen FNP   magnesium oxide (MAG-OX) 400 mg (241.3 mg magnesium) tablet Take 1 tablet (400 mg total) by mouth 2 (two) times daily. for 14 days 7/10/25 7/24/25  Maximo Fox MD   multivitamin (THERAGRAN) per tablet Take 1 tablet by mouth 3 (three) times daily.    Provider, Historical    OLANZapine (ZYPREXA) 5 MG tablet Take 5 mg by mouth once daily. 4/16/25   Provider, Historical   omega-3 acid ethyl esters (LOVAZA) 1 gram capsule Take 2 g by mouth 3 (three) times daily.    Provider, Historical   pantoprazole (PROTONIX) 20 MG tablet Take 1 tablet (20 mg total) by mouth once daily. 4/24/25 4/24/26  Arlene Noonan FNP   triamcinolone acetonide 0.1% (KENALOG) 0.1 % cream Apply topically 3 (three) times daily. for 10 days 6/19/25 6/30/25  Arlene Noonan FNP   vitamin D (VITAMIN D3) 1000 units Tab Take 1,000 Units by mouth 2 (two) times a day.    Provider, Historical       REVIEW OF SYSTEMS:   Except as documented, all other systems reviewed and negative     PHYSICAL EXAM:     VITAL SIGNS: 24 HRS MIN & MAX LAST   Temp  Min: 98.5 °F (36.9 °C)  Max: 99.2 °F (37.3 °C) 98.5 °F (36.9 °C)   BP  Min: 89/40  Max: 153/78 119/68   Pulse  Min: 108  Max: 128  109   Resp  Min: 11  Max: 25 17   SpO2  Min: 91 %  Max: 99 % 95 %     General appearance: Well-developed, well-nourished female in no apparent distress.  HENT: Atraumatic head. Moist mucous membranes of oral cavity.  Eyes: Normal extraocular movements.   Neck: Supple.   Lungs: Clear to auscultation bilaterally. No wheezing present.   Subclavian MediPort in place, no tenderness or redness   Heart: Regular rate and rhythm. S1 and S2 present with no murmurs/gallop/rub. No pedal edema. No JVD present.   Abdomen: Soft, non-distended, non-tender. No rebound tenderness/guarding. Bowel sounds are normal.   Extremities: No cyanosis, clubbing, or edema.  Skin: No Rash.   Neuro: Motor and sensory exams grossly intact. Good tone. Muscle strength 5/5 in all 4 extremities  Psych/mental status: Appropriate mood and affect. Responds appropriately to questions.     LABS AND IMAGING:     Recent Labs   Lab 07/17/25  0933   WBC 0.21  0.21*   RBC 2.05*   HGB 6.6*   HCT 20.4*   MCV 99.5*   MCH 32.2*   MCHC 32.4*   RDW 17.2*   PLT 68*   MPV 11.8*       Recent Labs    Lab 07/17/25  0933      K 4.2      CO2 24   BUN 22.7*   CREATININE 1.23*   *   CALCIUM 8.8   MG 1.40*   ALBUMIN 2.7*   PROT 6.4   ALKPHOS 131   ALT 18   AST 12   BILITOT 0.8       Microbiology Results (last 7 days)       Procedure Component Value Units Date/Time    Clostridium Diff Toxin, A & B, EIA [[id number]]     Order Status: Sent Specimen: Stool              X-Ray Chest PA And Lateral  Narrative: EXAMINATION:  XR CHEST PA AND LATERAL    CLINICAL HISTORY:  Sepsis;    TECHNIQUE:  Two-view    COMPARISON:  March 24, 2025.    FINDINGS:  Cardiopericardial silhouette is within normal limits.  Right chest implanted tunnel tamy catheter terminates within the superior vena cava.  No acute dense focal or segmental consolidation, congestion, pleural effusion or pneumothorax.  Impression: No acute cardiopulmonary process identified.    Electronically signed by: Christo Arteaga  Date:    07/17/2025  Time:    14:22      ASSESSMENT & PLAN:     Concern for Sepsis 2/2 below  Concern for Neutropenic fever, unclear source  - did have chills prior to presentation, no documented fever  Pancytopenia secondary to chemotherapy   Breast carcinoma      IDC left breast, biopsy 01/27/2025, triple negative, overall grade 3; cT2 cN0 MX, AJCC anatomic stage at least IIA, clinical prognostic stage at least IIB   Acute kidney injury  HypoMg    Hx of Hyperlipidemia, postmenopausal, sensorineural hearing loss, psoriatic arthritis, tinnitus, neuropathy    UA neg, CXR No acute cardiopulmonary process identified.     Plan  Continue Vancomcyin and cefepime  F/u cx until finalized; f/u  cdiff    WBC 0.21, plt 62, hb 6.6. 2 unit PRBC 7/17/25  Oncology  Neutropenic isolation   Keep hemoglobin greater than 7 unless more symptomatic than greater than 8   Keep platelets greater than 10,000 unless bleeding   Okay with Lovenox if platelets> 50 and no clinical evidence of bleeding     Acute kidney injury, Cr 1.2, baseline 0.8, monitor    Lactate 2.7 >>2.7, trop x 1 neg, monitor  S/p LR 3L per sepsis protocol  HypoMg, replace as needed    Resume home meds as appropriate  Labs am    Critical care note:  Critical care diagnosis: Symptomatic anemia requiring blood transfusion  Critical care interventions: Hands-on evaluation, review of labs/radiographs/records and discussion with patient and family if present  Critical care time spent: 35 minutes      VTE Prophylaxis: Lovenox if plt > 50/ SCD for DVT prophylaxis and will be advised to be as mobile as possible and sit in a chair as tolerated    Patient condition:  Fair    __________________________________________________________________________  INPATIENT LIST OF MEDICATIONS     Scheduled Meds:   mupirocin   Nasal BID    ondansetron         Continuous Infusions:  PRN Meds:.  Current Facility-Administered Medications:     0.9%  NaCl infusion (for blood administration), , Intravenous, Q24H PRN    acetaminophen, 1,000 mg, Oral, Q6H PRN    acetaminophen, 650 mg, Oral, Q4H PRN    dextrose 50%, 12.5 g, Intravenous, PRN    dextrose 50%, 25 g, Intravenous, PRN    glucagon (human recombinant), 1 mg, Intramuscular, PRN    glucose, 16 g, Oral, PRN    glucose, 24 g, Oral, PRN    naloxone, 0.02 mg, Intravenous, PRN    ondansetron, , ,     ondansetron, 4 mg, Intravenous, Q4H PRN    prochlorperazine, 5 mg, Intravenous, Q6H PRN    sodium chloride 0.9%, 10 mL, Intravenous, PRN    Discharge Planning and Disposition:     If patient was admitted under observational status it is with my approval/permission.        All diagnosis and differential diagnosis have been reviewed; assessment and plan has been documented; I have personally reviewed the labs and test results that are presently available; I have reviewed the patients medication list; I have reviewed the consulting providers response and recommendations. I have reviewed or attempted to review medical records based upon their availability.    All of the patient and  family questions have been addressed and answered. Patient's is agreeable to the above stated plan. I will continue to monitor closely and make adjustments to medical management as needed.    If patient was admitted under observational status it is with my approval/permission.      Ever Joe MD   07/17/2025

## 2025-07-17 NOTE — CONSULTS
[facility] - Oncology Acute  Hematology/Oncology  Consult Note    Patient Name: Alma Rosa Disla  MRN: [id number]  Admission Date: 7/17/2025  Hospital Length of Stay: 0 days  Attending Provider: Bran Argueta III, MD  Consulting Provider: Asher Ybarra MD  Principal Problem:<principal problem not specified>    Consults  Subjective:     HPI:  This is a 65-year-old female with a recent T2 N0 grade 3 triple negative breast carcinoma who was on treatment with   Her last therapy was on July 10th.  She did receive growth factors as well.    She was treated with Adriamycin Cytoxan on July 10th  Patient reports she slept for a couple of days.  Then she was getting up feeling weak and dizzy.  She fell.  And then she got significantly weak was brought into the emergency room  She denied any fever but was having chills and sweats.  She was found to be tachycardic hypotensive with blood pressures 89/40 and a heart rate of 120.  She would then have acute kidney injury    Was started on IV fluids vancomycin    We are called by the ER.  Because she had growth factors and did not recommend growth factors at this point.    Agree with broad-spectrum antibiotics and sepsis control.    The patient was seen and examined.  She has had 2 loose stools.  No blood in his stool, globally weak.  Rigors and chills, no more headaches or visual changes.  Dry mouth, no cough or shortness of breath.  Globally weak no focal weakness has chronic neuropathy has been on gabapentin in the past    No sick contacts at home.      Oncology Treatment Plan:   OP BREAST PEMBROLIZUMAB CARBOPLATIN (AUC 5) WITH WEEKLY PACLITAXEL FOLLOWED BY PEMBROLIZUMAB DOXORUBICIN CYCLOPHOSPHAMIDE FOLLOWED BY PEMBROLIZUMAB 200 MG Q3W    Medications:  Continuous Infusions:    Scheduled Meds:   vancomycin (VANCOCIN) 1,000 mg in D5W 250 mL IVPB (admixture device)  1,000 mg Intravenous ED 1 Time    Followed by    vancomycin (VANCOCIN) 1,000 mg in D5W 250 mL IVPB  (admixture device)  1,000 mg Intravenous ED 1 Time     PRN Meds:  Current Facility-Administered Medications:     0.9%  NaCl infusion (for blood administration), , Intravenous, Q24H PRN     Review of patient's allergies indicates:  No Known Allergies     Past Medical History:   Diagnosis Date    Breast cancer     left    Hyperlipidemia     Menopause 4/27/20020     Past Surgical History:   Procedure Laterality Date    ADENOIDECTOMY      CHOLECYSTECTOMY  1998    COLONOSCOPY      with biopsy    INSERTION OF TUNNELED CENTRAL VENOUS CATHETER (CVC) WITH SUBCUTANEOUS PORT N/A 3/24/2025    Procedure: [id number];  Surgeon: Vik Lynch Jr., MD;  Location: [facility];  Service: General;  Laterality: N/A;  Likely right    OCCLUSION, FALLOPIAN TUBE, USING DEVICE, BY VAGINAL OR SUPRAPUBIC APPROACH Bilateral     TONSILLECTOMY  1975     Family History       Problem Relation (Age of Onset)    Alzheimer's disease Mother    Arthritis Mother, Brother    Asbestos Paternal Uncle    Diabetes Mother, Sister, Brother, Brother, Maternal Grandmother    Epilepsy Brother    Heart disease Mother    Heart failure Father, Maternal Grandmother    Lung cancer Father    Osteoarthritis Maternal Aunt    Ovarian cancer Sister    Rashes / Skin problems Sister, Brother    Seizures Brother    Stroke Mother, Brother          Tobacco Use    Smoking status: Never     Passive exposure: Never    Smokeless tobacco: Never   Substance and Sexual Activity    Alcohol use: Not Currently     Comment: I ONLY WINE MAYBE TWICE year    Drug use: Never    Sexual activity: Yes     Partners: Male       Review of Systems pertinent positives above in interim history and daily note  Objective:     Vital Signs (Most Recent):  Temp: 99.2 °F (37.3 °C) (07/17/25 0917)  Pulse: (!) 115 (07/17/25 1242)  Resp: 15 (07/17/25 1242)  BP: (!) 153/78 (07/17/25 1242)  SpO2: 99 % (07/17/25 1242) Vital Signs (24h Range):  Temp:  [99.2 °F (37.3 °C)] 99.2 °F (37.3 °C)  Pulse:   "[112-128] 115  Resp:  [15-21] 15  SpO2:  [91 %-99 %] 99 %  BP: ()/(40-78) 153/78     Weight: 98.9 kg (218 lb)  Body mass index is 39.87 kg/m².  Body surface area is 2.08 meters squared.    No intake or output data in the 24 hours ending 07/17/25 1318    Physical Exam  Vitals reviewed.   Constitutional:       General: She is in acute distress.      Appearance: She is not ill-appearing.   HENT:      Head: Normocephalic.      Nose: Nose normal.      Mouth/Throat:      Mouth: Mucous membranes are dry.      Pharynx: Oropharynx is clear.   Eyes:      Extraocular Movements: Extraocular movements intact.      Pupils: Pupils are equal, round, and reactive to light.   Cardiovascular:      Rate and Rhythm: Tachycardia present.      Pulses: Normal pulses.   Pulmonary:      Effort: Pulmonary effort is normal.   Chest:      Comments: Subclavian MediPort in place, no tenderness or redness    Abdominal:      General: Abdomen is flat. Bowel sounds are normal.   Musculoskeletal:      Cervical back: Neck supple.      Right lower leg: No edema.      Left lower leg: No edema.   Skin:     General: Skin is dry.   Neurological:      General: No focal deficit present.      Mental Status: She is alert and oriented to person, place, and time.      Cranial Nerves: No cranial nerve deficit.   Psychiatric:         Mood and Affect: Mood normal.         Behavior: Behavior normal.         Thought Content: Thought content normal.         Judgment: Judgment normal.         Significant Labs:   CBC:   Recent Labs   Lab 07/17/25  0933   WBC 0.21  0.21*   HGB 6.6*   HCT 20.4*   PLT 68*   , CMP:   Recent Labs   Lab 07/17/25  0933      K 4.2      CO2 24   *   BUN 22.7*   CREATININE 1.23*   CALCIUM 8.8   PROT 6.4   ALBUMIN 2.7*   BILITOT 0.8   ALKPHOS 131   AST 12   ALT 18   , Coagulation: No results for input(s): "PT", "INR", "APTT" in the last 48 hours., LDH: No results for input(s): "LDHCSF", "BFSOURCE" in the last 48 hours., " "Reticulocytes: No results for input(s): "RETIC" in the last 48 hours., and Uric Acid No results for input(s): "URICACID" in the last 48 hours.    Diagnostic Results:  Pending    Assessment/Plan:   Pancytopenia secondary to chemotherapy   Sepsis        Lactic acidosis             Hypotension          Tachycardia          Rigors          Acute kidney injury  Neutropenic fever  Breast carcinoma      IDC left breast, biopsy 01/27/2025, triple negative, overall grade 3; cT2 cN0 MX, AJCC anatomic stage at least IIA, clinical prognostic stage at least IIB     Chronic medical problems: Hyperlipidemia, postmenopausal, sensorineural hearing loss, psoriatic arthritis, JENNIFER, tinnitus, neuropathy      Recommendations  Neutropenic isolation   Keep hemoglobin greater than 7 unless more symptomatic than greater than 8   Keep platelets greater than 10,000 unless bleeding   Agree with broad-spectrum antibiotics vancomycin, cefepime  Daily labs for several days, then q.48h   SCDs,   Okay with Lovenox if platelets> 50 and no clinical evidence of bleeding      Discussed with ER MD  If more refractory hypotension consider ICU.    Patient is so far is responding blood pressure with fluids-- but is still tachycardic      Thank you for your consult.     Asher Ybarra MD  Hematology/Oncology  [facility] - Oncology [facility]   "

## 2025-07-18 LAB
ABS NEUT (OLG): 0.04 X10(3)/MCL (ref 2.1–9.2)
ALBUMIN SERPL-MCNC: 2.3 G/DL (ref 3.4–4.8)
ALBUMIN/GLOB SERPL: 0.6 RATIO (ref 1.1–2)
ALP SERPL-CCNC: 118 UNIT/L (ref 40–150)
ALT SERPL-CCNC: 16 UNIT/L (ref 0–55)
ANION GAP SERPL CALC-SCNC: 8 MEQ/L
ANISOCYTOSIS BLD QL SMEAR: ABNORMAL
AST SERPL-CCNC: 10 UNIT/L (ref 11–45)
BASOPHILS NFR BLD MANUAL: 0.03 X10(3)/MCL (ref 0–0.2)
BASOPHILS NFR BLD MANUAL: 6 %
BILIRUB SERPL-MCNC: 0.6 MG/DL
BUN SERPL-MCNC: 15.8 MG/DL (ref 9.8–20.1)
CALCIUM SERPL-MCNC: 8.5 MG/DL (ref 8.4–10.2)
CHLORIDE SERPL-SCNC: 104 MMOL/L (ref 98–107)
CO2 SERPL-SCNC: 24 MMOL/L (ref 23–31)
CREAT SERPL-MCNC: 0.79 MG/DL (ref 0.55–1.02)
CREAT/UREA NIT SERPL: 20
DOHLE BOD BLD QL SMEAR: ABNORMAL
EOSINOPHIL NFR BLD MANUAL: 0.01 X10(3)/MCL (ref 0–0.9)
EOSINOPHIL NFR BLD MANUAL: 2 %
ERYTHROCYTE [DISTWIDTH] IN BLOOD BY AUTOMATED COUNT: 18.2 % (ref 11.5–17)
GFR SERPLBLD CREATININE-BSD FMLA CKD-EPI: >60 ML/MIN/1.73/M2
GIANT PLATELETS: ABNORMAL
GLOBULIN SER-MCNC: 3.6 GM/DL (ref 2.4–3.5)
GLUCOSE SERPL-MCNC: 96 MG/DL (ref 82–115)
HCT VFR BLD AUTO: 26.5 % (ref 37–47)
HGB BLD-MCNC: 8.7 G/DL (ref 12–16)
INSTRUMENT WBC (OLG): 0.54 X10(3)/MCL
LACTATE SERPL-SCNC: 0.8 MMOL/L (ref 0.5–2.2)
LYMPHOCYTES NFR BLD MANUAL: 0.37 X10(3)/MCL (ref 0.6–4.6)
LYMPHOCYTES NFR BLD MANUAL: 69 %
MAGNESIUM SERPL-MCNC: 2.2 MG/DL (ref 1.6–2.6)
MCH RBC QN AUTO: 29.9 PG (ref 27–31)
MCHC RBC AUTO-ENTMCNC: 32.8 G/DL (ref 33–36)
MCV RBC AUTO: 91.1 FL (ref 80–94)
MICROCYTES BLD QL SMEAR: ABNORMAL
MONOCYTES NFR BLD MANUAL: 0.07 X10(3)/MCL (ref 0.1–1.3)
MONOCYTES NFR BLD MANUAL: 13 %
MYELOCYTES NFR BLD MANUAL: 2 %
NEUTROPHILS NFR BLD MANUAL: 8 %
NRBC BLD MANUAL-RTO: 14 %
PLATELET # BLD AUTO: 44 X10(3)/MCL (ref 130–400)
PLATELET # BLD EST: ABNORMAL 10*3/UL
PLATELETS.RETICULATED NFR BLD AUTO: 6.5 % (ref 0.9–11.2)
PMV BLD AUTO: 11.8 FL (ref 7.4–10.4)
POIKILOCYTOSIS BLD QL SMEAR: ABNORMAL
POTASSIUM SERPL-SCNC: 3.2 MMOL/L (ref 3.5–5.1)
PROMYELOCYTES # BLD MANUAL: 2 %
PROT SERPL-MCNC: 5.9 GM/DL (ref 5.8–7.6)
RBC # BLD AUTO: 2.91 X10(6)/MCL (ref 4.2–5.4)
RBC MORPH BLD: ABNORMAL
SODIUM SERPL-SCNC: 136 MMOL/L (ref 136–145)
TOXIC GRANULES BLD QL SMEAR: ABNORMAL
VANCOMYCIN SERPL-MCNC: 12.1 UG/ML (ref 15–20)
WBC # BLD AUTO: 0.54 X10(3)/MCL (ref 4.5–11.5)

## 2025-07-18 PROCEDURE — 97162 PT EVAL MOD COMPLEX 30 MIN: CPT

## 2025-07-18 PROCEDURE — 85025 COMPLETE CBC W/AUTO DIFF WBC: CPT

## 2025-07-18 PROCEDURE — 97535 SELF CARE MNGMENT TRAINING: CPT

## 2025-07-18 PROCEDURE — 97166 OT EVAL MOD COMPLEX 45 MIN: CPT

## 2025-07-18 PROCEDURE — 80053 COMPREHEN METABOLIC PANEL: CPT

## 2025-07-18 PROCEDURE — 83735 ASSAY OF MAGNESIUM: CPT | Performed by: INTERNAL MEDICINE

## 2025-07-18 PROCEDURE — 25000003 PHARM REV CODE 250: Performed by: INTERNAL MEDICINE

## 2025-07-18 PROCEDURE — 63600175 PHARM REV CODE 636 W HCPCS: Performed by: INTERNAL MEDICINE

## 2025-07-18 PROCEDURE — 36415 COLL VENOUS BLD VENIPUNCTURE: CPT | Performed by: INTERNAL MEDICINE

## 2025-07-18 PROCEDURE — 99232 SBSQ HOSP IP/OBS MODERATE 35: CPT | Mod: ,,, | Performed by: INTERNAL MEDICINE

## 2025-07-18 PROCEDURE — 21400001 HC TELEMETRY ROOM

## 2025-07-18 PROCEDURE — 25000003 PHARM REV CODE 250

## 2025-07-18 PROCEDURE — 83605 ASSAY OF LACTIC ACID: CPT | Performed by: INTERNAL MEDICINE

## 2025-07-18 PROCEDURE — 80202 ASSAY OF VANCOMYCIN: CPT | Performed by: INTERNAL MEDICINE

## 2025-07-18 PROCEDURE — 27000207 HC ISOLATION

## 2025-07-18 RX ORDER — CEFEPIME HYDROCHLORIDE 2 G/1
2 INJECTION, POWDER, FOR SOLUTION INTRAVENOUS
Status: DISCONTINUED | OUTPATIENT
Start: 2025-07-18 | End: 2025-07-20 | Stop reason: HOSPADM

## 2025-07-18 RX ORDER — POTASSIUM CHLORIDE 20 MEQ/1
40 TABLET, EXTENDED RELEASE ORAL ONCE
Status: COMPLETED | OUTPATIENT
Start: 2025-07-18 | End: 2025-07-18

## 2025-07-18 RX ADMIN — CEFEPIME 2 G: 2 INJECTION, POWDER, FOR SOLUTION INTRAVENOUS at 09:07

## 2025-07-18 RX ADMIN — MUPIROCIN: 20 OINTMENT TOPICAL at 09:07

## 2025-07-18 RX ADMIN — MUPIROCIN: 20 OINTMENT TOPICAL at 08:07

## 2025-07-18 RX ADMIN — POTASSIUM CHLORIDE 40 MEQ: 1500 TABLET, EXTENDED RELEASE ORAL at 08:07

## 2025-07-18 RX ADMIN — GABAPENTIN 100 MG: 100 CAPSULE ORAL at 02:07

## 2025-07-18 RX ADMIN — CEFEPIME 2 G: 2 INJECTION, POWDER, FOR SOLUTION INTRAVENOUS at 05:07

## 2025-07-18 RX ADMIN — VANCOMYCIN HYDROCHLORIDE 1000 MG: 1 INJECTION, POWDER, LYOPHILIZED, FOR SOLUTION INTRAVENOUS at 10:07

## 2025-07-18 RX ADMIN — PANTOPRAZOLE SODIUM 20 MG: 20 TABLET, DELAYED RELEASE ORAL at 09:07

## 2025-07-18 RX ADMIN — VANCOMYCIN HYDROCHLORIDE 1000 MG: 1 INJECTION, POWDER, LYOPHILIZED, FOR SOLUTION INTRAVENOUS at 11:07

## 2025-07-18 RX ADMIN — ACETAMINOPHEN 650 MG: 325 TABLET ORAL at 09:07

## 2025-07-18 RX ADMIN — GABAPENTIN 100 MG: 100 CAPSULE ORAL at 08:07

## 2025-07-18 RX ADMIN — GABAPENTIN 100 MG: 100 CAPSULE ORAL at 09:07

## 2025-07-18 RX ADMIN — CEFEPIME 2 G: 2 INJECTION, POWDER, FOR SOLUTION INTRAVENOUS at 02:07

## 2025-07-18 NOTE — PLAN OF CARE
Problem: Physical Therapy  Goal: Physical Therapy Goal  Description: Goals to be met by: 25     Patient will increase functional independence with mobility by performin. Sit to stand transfer with Greer  2. Bed to chair transfer with Modified Greer using Rolling Walker  3. Gait  x 150 feet with Modified Greer using Rolling Walker.     Outcome: Progressing

## 2025-07-18 NOTE — PLAN OF CARE
07/18/25 1136   Discharge Assessment   Assessment Type Discharge Planning Assessment   Confirmed/corrected address, phone number and insurance Yes   Confirmed Demographics Correct on Facesheet   Source of Information patient   Communicated CHERELLE with patient/caregiver Date not available/Unable to determine   People in Home spouse;child(atul), adult   Name(s) of People in Home FltonyJustin whitein (Spouse)  [phone] (E   Do you expect to return to your current living situation? Yes   Do you have help at home or someone to help you manage your care at home? Yes   Who are your caregiver(s) and their phone number(s)? FltonyJustin whitein (Spouse)  [phone] (V   Prior to hospitilization cognitive status: Alert/Oriented   Current cognitive status: Alert/Oriented   Walking or Climbing Stairs Difficulty no   Dressing/Bathing Difficulty yes   Dressing/Bathing bathing difficulty, requires equipment   Dressing/Bathing Management sc   Home Accessibility wheelchair accessible   Equipment Currently Used at Home shower chair;blood pressure machine;glucometer   Readmission within 30 days? No   Patient currently being followed by outpatient case management? No   Do you currently have service(s) that help you manage your care at home? No   Do you take prescription medications? Yes   Do you have prescription coverage? Yes   Do you have any problems affording any of your prescribed medications? No   Is the patient taking medications as prescribed? yes   Who is going to help you get home at discharge? Min Disla (Spouse)  [phone] (G   How do you get to doctors appointments? family or friend will provide   Are you on dialysis? No   Do you take coumadin? No   Discharge Plan A Home with family;Home Health   Discharge Plan B Home with family   DME Needed Upon Discharge  other (see comments)  (tbd)   Discharge Plan discussed with: Patient;Spouse/sig other;Adult children   Name(s) and Number(s) FltonyMin white (Spouse)  [phone] (A    Transition of Care Barriers None   Physical Activity   On average, how many days per week do you engage in moderate to strenuous exercise (like a brisk walk)? 0 days   On average, how many minutes do you engage in exercise at this level? 0 min   Financial Resource Strain   How hard is it for you to pay for the very basics like food, housing, medical care, and heating? Not very   Housing Stability   In the last 12 months, was there a time when you were not able to pay the mortgage or rent on time? N   At any time in the past 12 months, were you homeless or living in a shelter (including now)? N   Transportation Needs   In the past 12 months, has lack of transportation kept you from medical appointments or from getting medications? no   In the past 12 months, has lack of transportation kept you from meetings, work, or from getting things needed for daily living? No   Food Insecurity   Within the past 12 months, you worried that your food would run out before you got the money to buy more. Never true   Within the past 12 months, the food you bought just didn't last and you didn't have money to get more. Never true   Stress   Do you feel stress - tense, restless, nervous, or anxious, or unable to sleep at night because your mind is troubled all the time - these days? Not at all   Social Isolation   How often do you feel lonely or isolated from those around you?  Never   Alcohol Use   Q1: How often do you have a drink containing alcohol? Never   Utilities   In the past 12 months has the electric, gas, oil, or water company threatened to shut off services in your home? No   Health Literacy   How often do you need to have someone help you when you read instructions, pamphlets, or other written material from your doctor or pharmacy? Sometimes

## 2025-07-18 NOTE — PROGRESS NOTES
[facility] Hospital Medicine Progress Note        Chief Complaint: Inpatient Follow-up     HPI:   Alma Rosa Disla is a 65 y.o. female who  has a past medical history of recent T2 N0 grade 3 triple negative breast carcinoma who was on treatment with (last therapy was on July 10th.  did receive growth factors; was treated with Adriamycin Cytoxan on July 10th), Hyperlipidemia.      The patient presented to [facility] on 7/17/2025 with a primary complaint of weakness.      Has had worsening feeling weakness and dizziness.  She fell prior to presentation and then she got significantly weak was brought into the emergency room. She denied any fever but was having chills and sweats.  She has had 2 loose stools.  No blood in his stool, globally weak, no more headaches or visual changes, no cough or shortness of breath.  Globally weak no focal weakness has chronic neuropathy has been on gabapentin in the past. No sick contacts at home.     She was found to be tachycardic hypotensive. Was started on IV fluids vancomycin and cefepime     Admitted to  service. Oncology on consult.    Interval Hx:     Tmax 100.3. NAEON. Somewhat feeling better today.    Case was discussed with patient's nurse and  on the floor.    Objective/physical exam:  General: In no acute distress, afebrile  Chest: Clear to auscultation bilaterally  Subclavian MediPort in place, no tenderness or redness   Heart: RRR, +S1, S2, no appreciable murmur  Abdomen: Soft, nontender, BS +  MSK: Warm, no lower extremity edema, no clubbing or cyanosis  Neurologic: Alert and oriented, moving all extremities with good strength    VITAL SIGNS: 24 HRS MIN & MAX LAST   Temp  Min: 98 °F (36.7 °C)  Max: 100.3 °F (37.9 °C) 98.4 °F (36.9 °C)   BP  Min: 99/53  Max: 141/65 124/61   Pulse  Min: 95  Max: 110  95   Resp  Min: 16  Max: 21 18   SpO2  Min: 93 %  Max: 98 % 98 %     I have reviewed the following labs:  Recent Labs   Lab  07/17/25  0933 07/18/25  0532   WBC 0.21  0.21* 0.54  0.54*   RBC 2.05* 2.91*   HGB 6.6* 8.7*   HCT 20.4* 26.5*   MCV 99.5* 91.1   MCH 32.2* 29.9   MCHC 32.4* 32.8*   RDW 17.2* 18.2*   PLT 68* 44*   MPV 11.8* 11.8*     Recent Labs   Lab 07/17/25  0933 07/18/25  0532    136   K 4.2 3.2*    104   CO2 24 24   BUN 22.7* 15.8   CREATININE 1.23* 0.79   * 96   CALCIUM 8.8 8.5   MG 1.40* 2.20   ALBUMIN 2.7* 2.3*   PROT 6.4 5.9   ALKPHOS 131 118   ALT 18 16   AST 12 10*   BILITOT 0.8 0.6     Microbiology Results (last 7 days)       Procedure Component Value Units Date/Time    Blood Culture [[id number]] Collected: 07/17/25 1807    Order Status: Resulted Specimen: Blood Updated: 07/17/25 1948    Blood Culture [[id number]] Collected: 07/17/25 1815    Order Status: Resulted Specimen: Blood Updated: 07/17/25 1904    Stool Culture [[id number]]     Order Status: Sent Specimen: Stool     Clostridium Diff Toxin, A & B, EIA [[id number]]     Order Status: Sent Specimen: Stool              See below for Radiology    Assessment/Plan:    Concern for Sepsis 2/2 below  Neutropenic fever, unclear source  Pancytopenia secondary to chemotherapy   Breast carcinoma      IDC left breast, biopsy 01/27/2025, triple negative, overall grade 3; cT2 cN0 MX, AJCC anatomic stage at least IIA, clinical prognostic stage at least IIB   Acute kidney injury  HypoMg     Hx of Hyperlipidemia, postmenopausal, sensorineural hearing loss, psoriatic arthritis, tinnitus, neuropathy     UA neg, CXR No acute cardiopulmonary process identified.      Plan  Continue Vancomcyin and cefepime  F/u cx until finalized; f/u  cdiff     WBC 0.21>>0.5, plt 62>>44, 2 unit PRBC 7/17/25  G-CSF on July 11th   Oncology  Neutropenic isolation   Keep hemoglobin greater than 7 unless more symptomatic than greater than 8   Keep platelets greater than 10,000 unless bleeding   Okay with Lovenox if platelets> 50 and no clinical evidence of bleeding      Acute kidney  injury, Cr 1.2, baseline 0.8, monitor   Lactate 2.7 >>2.7>>normal, trop x 1 neg  S/p LR 3L per sepsis protocol  HypoMg, replace as needed     Resume home meds as appropriate  Labs am    VTE Prophylaxis: Lovenox if plt > 50/ SCD for DVT prophylaxis and will be advised to be as mobile as possible and sit in a chair as tolerated     Patient condition:  Fair      Anticipated discharge and Disposition:         All diagnosis and differential diagnosis have been reviewed; assessment and plan has been documented; I have personally reviewed the labs and test results that are presently available; I have reviewed the patients medication list; I have reviewed the consulting providers response and recommendations. I have reviewed or attempted to review medical records based upon their availability    All of the patient's questions have been  addressed and answered. Patient's is agreeable to the above stated plan. I will continue to monitor closely and make adjustments to medical management as needed.    Portions of this note dictated using EMR integrated voice recognition software, and may be subject to voice recognition errors not corrected at proofreading. Please contact writer for clarification if needed.   _____________________________________________________________________    Malnutrition Status:  Nutrition consulted. Most recent weight and BMI monitored-     Measurements:  Wt Readings from Last 1 Encounters:   07/18/25 98.9 kg (218 lb 0.6 oz)   Body mass index is 39.88 kg/m².    Patient has been screened and assessed by RD.    Malnutrition Type:  Context:    Level:      Malnutrition Characteristic Summary:       Interventions/Recommendations (treatment strategy):        Scheduled Med:   ceFEPime IV (PEDS and ADULTS)  2 g Intravenous Q8H    DULoxetine  60 mg Oral Daily    gabapentin  100 mg Oral TID    mupirocin   Nasal BID    OLANZapine  5 mg Oral Daily    pantoprazole  20 mg Oral Daily    vancomycin (VANCOCIN) 1,000 mg  in D5W 250 mL IVPB (admixture device)  1,000 mg Intravenous Q12H      Continuous Infusions:     PRN Meds:    Current Facility-Administered Medications:     0.9%  NaCl infusion (for blood administration), , Intravenous, Q24H PRN    acetaminophen, 1,000 mg, Oral, Q6H PRN    acetaminophen, 650 mg, Oral, Q4H PRN    dextrose 50%, 12.5 g, Intravenous, PRN    dextrose 50%, 25 g, Intravenous, PRN    glucagon (human recombinant), 1 mg, Intramuscular, PRN    glucose, 16 g, Oral, PRN    glucose, 24 g, Oral, PRN    naloxone, 0.02 mg, Intravenous, PRN    ondansetron, 4 mg, Intravenous, Q4H PRN    prochlorperazine, 5 mg, Intravenous, Q6H PRN    sodium chloride 0.9%, 10 mL, Intravenous, PRN    vancomycin - pharmacy to dose, , Intravenous, pharmacy to manage frequency     Radiology:  I have personally reviewed the following imaging and agree with the radiologist.     X-Ray Chest PA And Lateral  Narrative: EXAMINATION:  XR CHEST PA AND LATERAL    CLINICAL HISTORY:  Sepsis;    TECHNIQUE:  Two-view    COMPARISON:  March 24, 2025.    FINDINGS:  Cardiopericardial silhouette is within normal limits.  Right chest implanted tunnel tamy catheter terminates within the superior vena cava.  No acute dense focal or segmental consolidation, congestion, pleural effusion or pneumothorax.  Impression: No acute cardiopulmonary process identified.    Electronically signed by: Christo Arteaga  Date:    07/17/2025  Time:    14:22      Ever Joe MD  Department of Hospital Medicine   [facility]   07/18/2025

## 2025-07-18 NOTE — PROGRESS NOTES
[facility] - Oncology Acute  Hematology/Oncology  Consult Note    Patient Name: Alma Rosa Disla  MRN: [id number]  Admission Date: 7/17/2025  Hospital Length of Stay: 1 days  Attending Provider: Ever Joe MD  Consulting Provider: Asher Ybarra MD  Principal Problem:Invasive ductal carcinoma of left breast    Consults  Subjective:     HPI:  This is a 65-year-old female with a recent T2 N0 grade 3 triple negative breast carcinoma who was on treatment with   Her last therapy was on July 10th.  She did receive growth factors as well.    She was treated with Adriamycin Cytoxan on July 10th  Patient reports she slept for a couple of days.  Then she was getting up feeling weak and dizzy.  She fell.  And then she got significantly weak was brought into the emergency room  She denied any fever but was having chills and sweats.  She was found to be tachycardic hypotensive with blood pressures 89/40 and a heart rate of 120.  She would then have acute kidney injury    Was started on IV fluids vancomycin    We are called by the ER.  Because she had growth factors and did not recommend growth factors at this point.    Agree with broad-spectrum antibiotics and sepsis control.    The patient was seen and examined.  She has had 2 loose stools.  No blood in his stool, globally weak.  Rigors and chills, no more headaches or visual changes.  Dry mouth, no cough or shortness of breath.  Globally weak no focal weakness has chronic neuropathy has been on gabapentin in the past    No sick contacts at home.      Today 07/18/2025:   Patient on the floor.    No chills no sweats   ate breakfast this morning.  Feels better.  No pain dry mouth improved.  Starting to urinate.  Positive flatus no bowel movement.    Still with temp 100.3°.  Systolic blood pressure 100/63, O2 sat 94%        Oncology Treatment Plan:   OP BREAST PEMBROLIZUMAB CARBOPLATIN (AUC 5) WITH WEEKLY PACLITAXEL FOLLOWED BY PEMBROLIZUMAB DOXORUBICIN  CYCLOPHOSPHAMIDE FOLLOWED BY PEMBROLIZUMAB 200 MG Q3W    Medications:  Continuous Infusions:    Scheduled Meds:   ceFEPime IV (PEDS and ADULTS)  2 g Intravenous Q12H    DULoxetine  60 mg Oral Daily    gabapentin  100 mg Oral TID    mupirocin   Nasal BID    OLANZapine  5 mg Oral Daily    pantoprazole  20 mg Oral Daily    vancomycin 1,500 mg in D5W 250 mL IVPB (admixture device)  1,500 mg Intravenous Q24H     PRN Meds:  Current Facility-Administered Medications:     0.9%  NaCl infusion (for blood administration), , Intravenous, Q24H PRN    acetaminophen, 1,000 mg, Oral, Q6H PRN    acetaminophen, 650 mg, Oral, Q4H PRN    dextrose 50%, 12.5 g, Intravenous, PRN    dextrose 50%, 25 g, Intravenous, PRN    glucagon (human recombinant), 1 mg, Intramuscular, PRN    glucose, 16 g, Oral, PRN    glucose, 24 g, Oral, PRN    naloxone, 0.02 mg, Intravenous, PRN    ondansetron, 4 mg, Intravenous, Q4H PRN    prochlorperazine, 5 mg, Intravenous, Q6H PRN    sodium chloride 0.9%, 10 mL, Intravenous, PRN    vancomycin - pharmacy to dose, , Intravenous, pharmacy to manage frequency     Review of patient's allergies indicates:  No Known Allergies     Past Medical History:   Diagnosis Date    Breast cancer     left    Hyperlipidemia     Menopause 4/27/20020     Past Surgical History:   Procedure Laterality Date    ADENOIDECTOMY      CHOLECYSTECTOMY  1998    COLONOSCOPY      with biopsy    INSERTION OF TUNNELED CENTRAL VENOUS CATHETER (CVC) WITH SUBCUTANEOUS PORT N/A 3/24/2025    Procedure: [id number];  Surgeon: Vik Lynch Jr., MD;  Location: [facility];  Service: General;  Laterality: N/A;  Likely right    OCCLUSION, FALLOPIAN TUBE, USING DEVICE, BY VAGINAL OR SUPRAPUBIC APPROACH Bilateral     TONSILLECTOMY  1975     Family History       Problem Relation (Age of Onset)    Alzheimer's disease Mother    Arthritis Mother, Brother    Asbestos Paternal Uncle    Diabetes Mother, Sister, Brother, Brother, Maternal Grandmother     Epilepsy Brother    Heart disease Mother    Heart failure Father, Maternal Grandmother    Lung cancer Father    Osteoarthritis Maternal Aunt    Ovarian cancer Sister    Rashes / Skin problems Sister, Brother    Seizures Brother    Stroke Mother, Brother          Tobacco Use    Smoking status: Never     Passive exposure: Never    Smokeless tobacco: Never   Substance and Sexual Activity    Alcohol use: Not Currently     Comment: I ONLY WINE MAYBE TWICE year    Drug use: Never    Sexual activity: Yes     Partners: Male       Review of Systems pertinent positives above in interim history and daily note  Objective:     Vital Signs (Most Recent):  Temp: 100.3 °F (37.9 °C) (07/18/25 0811)  Pulse: 102 (07/18/25 0811)  Resp: 18 (07/17/25 2114)  BP: 100/63 (07/18/25 0811)  SpO2: (!) 94 % (07/18/25 0811) Vital Signs (24h Range):  Temp:  [98 °F (36.7 °C)-100.3 °F (37.9 °C)] 100.3 °F (37.9 °C)  Pulse:  [101-128] 102  Resp:  [11-25] 18  SpO2:  [91 %-99 %] 94 %  BP: ()/(40-83) 100/63     Weight: 98.9 kg (218 lb 0.6 oz)  Body mass index is 39.88 kg/m².  Body surface area is 2.08 meters squared.      Intake/Output Summary (Last 24 hours) at 7/18/2025 0901  Last data filed at 7/17/2025 2040  Gross per 24 hour   Intake 361.25 ml   Output --   Net 361.25 ml       Physical Exam  Vitals reviewed.   Constitutional:       Appearance: She is not ill-appearing.   HENT:      Head: Normocephalic.      Nose: Nose normal.      Mouth/Throat:      Mouth: Mucous membranes are dry.      Pharynx: Oropharynx is clear.   Eyes:      Extraocular Movements: Extraocular movements intact.      Pupils: Pupils are equal, round, and reactive to light.   Cardiovascular:      Rate and Rhythm: Tachycardia present.      Pulses: Normal pulses.   Pulmonary:      Effort: Pulmonary effort is normal.   Chest:      Comments: Subclavian MediPort in place, no tenderness or redness    Abdominal:      General: Abdomen is flat. Bowel sounds are normal.  "  Musculoskeletal:      Cervical back: Neck supple.      Right lower leg: No edema.      Left lower leg: No edema.   Skin:     General: Skin is dry.      Comments: Patient has left lower extremity wound from tea-colored burn on the left lateral thigh.   Neurological:      General: No focal deficit present.      Mental Status: She is alert and oriented to person, place, and time.      Cranial Nerves: No cranial nerve deficit.   Psychiatric:         Mood and Affect: Mood normal.         Behavior: Behavior normal.         Thought Content: Thought content normal.         Judgment: Judgment normal.         Significant Labs:   CBC:   Recent Labs   Lab 07/17/25  0933 07/18/25  0532   WBC 0.21  0.21* 0.54  0.54*   HGB 6.6* 8.7*   HCT 20.4* 26.5*   PLT 68* 44*   , CMP:   Recent Labs   Lab 07/17/25  0933 07/18/25  0532    136   K 4.2 3.2*    104   CO2 24 24   * 96   BUN 22.7* 15.8   CREATININE 1.23* 0.79   CALCIUM 8.8 8.5   PROT 6.4 5.9   ALBUMIN 2.7* 2.3*   BILITOT 0.8 0.6   ALKPHOS 131 118   AST 12 10*   ALT 18 16   , Coagulation: No results for input(s): "PT", "INR", "APTT" in the last 48 hours., LDH: No results for input(s): "LDHCSF", "BFSOURCE" in the last 48 hours., Reticulocytes: No results for input(s): "RETIC" in the last 48 hours., and Uric Acid No results for input(s): "URICACID" in the last 48 hours.    Diagnostic Results:  Pending    Assessment/Plan:   Pancytopenia secondary to chemotherapy   Sepsis        Lactic acidosis   ---resolved          Hypotension--resolving          Tachycardia---still present          Rigors---          Acute kidney injury---resolved  Neutropenic fever---hospital day 2, vancomycin cefepime day 2  Breast carcinoma      IDC left breast, biopsy 01/27/2025, triple negative, overall grade 3; cT2 cN0 MX, AJCC anatomic stage at least IIA, clinical prognostic stage at least IIB   Her skin 1 was present on admission.  She states he has been present for several weeks.  She " spilled T hot tea on her leg in her ankle      Chronic medical problems: Hyperlipidemia, postmenopausal, sensorineural hearing loss, psoriatic arthritis, JENNIFER, tinnitus, neuropathy, skin wound          Recommendations  Neutropenic isolation -continue           She had G-CSF on July 11th her white count is slightly improving.  I do not think she needs anymore at this point. Keep hemoglobin greater than 7 unless more symptomatic than greater than 8   Keep platelets greater than 10,000 unless bleeding   Agree with broad-spectrum antibiotics vancomycin, cefepime  Daily labs for several days, then q.48h   SCDs,   Okay with Lovenox if platelets> 50 and no clinical evidence of bleeding           Platelets 44 today, hold Lovenox      Dr Robledo to see over the weekend    Thank you for your consult.     Asher Ybarra MD  Hematology/Oncology  [facility] - Oncology [facility]

## 2025-07-18 NOTE — PT/OT/SLP EVAL
Occupational Therapy  Evaluation    Name: Alma Rosa Disla  MRN: [id number]  Recent Surgery: * No surgery found *      Recommendations:     Discharge therapy intensity: Low Intensity Therapy   Discharge Equipment Recommendations:  walker, rolling  Barriers to discharge:  Other (Comment) (Ongoing medical needs)    Assessment:     Alma Rosa Disla is a 65 y.o. female with a medical diagnosis of recent fall with weakness, accompanied by dizziness, and T2 N0 grade 3 triple negative breast carcinoma, on chemotherapy.  She presents with the following performance deficits affecting function: weakness, impaired endurance, impaired sensation, impaired functional mobility, impaired coordination, impaired cardiopulmonary response to activity.     Pt doffed/donned socks while seated with SBA and performed toilet t/f with Jade using RW for support. Completed toileting with SBA-CGA. Washed hands standing at sink with CGA using RW. Completed in-room functional mobility (approx. 7x2 ft) with CGA-Jade using RW, with verbal cues for RW management. Noted elevated HR with activity. Recommending low intensity therapy upon d/c with family support at home.     DME Justification:  Alma Rosa's mobility limitation cannot be sufficiently resolved by the use of a cane. Her functional mobility deficit can be sufficiently resolved with the use of a Rolling Walker. Patient's mobility limitation significantly impairs their ability to participate in one of more activities of daily living.  The use of a RW will significantly improve the patient's ability to participate in MRADLS and the patient will use it on regular basis in the home.    Rehab Prognosis: Good; patient would benefit from acute skilled OT services to address these deficits and reach maximum level of function.       Plan:     Patient to be seen 5 x/week to address the above listed problems via self-care/home management, therapeutic activities, therapeutic exercises  Plan of Care Expires:  "08/18/25  Plan of Care Reviewed with: patient    Subjective     Chief Complaint: Numbness in BUE/BLE (LLE>RLE)  Patient/Family Comments/goals: return to PLOF    Occupational Profile:  Living Environment: Lives with  and son in [facility] with 0 PEPE; Tub shower with SC  Previous level of function: Independent ADLs/IADLs   Roles and Routines: mother, wife  Equipment Used at Home: shower chair, rollator  Assistance upon Discharge:     *reports several falls, often bringing  "down with" her    Pain/Comfort:  Pain Rating 1: 0/10    Patients cultural, spiritual, Yazidi conflicts given the current situation: no    Objective:     OT communicated with RN (Jeff) prior to session.      Patient was found up in chair with PureWick, telemetry upon OT entry to room.    General Precautions: Standard, fall, contact, respiratory  Orthopedic Precautions: N/A  Braces: N/A    Vital Signs: Blood Pressure: 104/55 (start -seated), 96/86 (seated after ax), 105/55 (seated with BLEs elevated)  HR: 113 (start - seated), 134 (standing at sink), 122 (seated after ax), 122 (seated with BLEs elevated)      Functional Mobility/Transfers:  Transfers: Sit to Stand: contact guard assistance with rolling walker  Toilet Transfer: minimum assistance with rolling walker using Step Transfer ; required verbal cues for RW management during step transfer  Functional Mobility: In-room (approx. 7x2 ft) with CGA-Jade using RW; Min verbal cues for RW management    Activities of Daily Living:  Grooming: contact guard assistance washing hands standing at sink  Lower Body Dressing: stand by assistance doffing/donning socks seated in chair  Toileting: stand by assistance shell-care seated, CGA for clothing management    AMPAC 6 Click ADL:  AMPAC Total Score: 20    Functional Cognition:  Intact    Visual Perceptual Skills:  Intact    Upper Extremity Function:  Right Upper Extremity:   WFL    Left Upper Extremity:  WFL    Balance:   Dynamic sitting " balance:Good  Static standing balance:Good with BUE support on RW  Dynamic standing balance:Fair+ with BUE supporton RW    Therapeutic Positioning  Risk for acquired pressure injuries is decreased due to ability to get to BSC/toilet with assist.    OT interventions performed during the course of today's session:   Education was provided on benefits of and recommendations for therapeutic positioning    Skin assessment: Visible surfaces   Findings: no redness or breakdown noted , wound present on RLE (wound care assessing upon exit)    OT recommendations for therapeutic positioning throughout hospitalization:   Follow [facility] Pressure Injury Prevention Protocol    Additional Treatment:  Self-care: See ADLs and functional mobility sections above    Co-Treatment: No    Patient Education:  Patient provided with verbal education education regarding OT role/goals/POC, fall prevention, safety awareness, and Discharge/DME recommendations.  Understanding was verbalized.     Patient left up in chair with call button in reach.    GOALS:   Multidisciplinary Problems       Occupational Therapy Goals          Problem: Occupational Therapy    Goal Priority Disciplines Outcome Interventions   Occupational Therapy Goal     OT, PT/OT                         History:     Past Medical History:   Diagnosis Date    Breast cancer     left    Hyperlipidemia     Menopause 4/27/20020         Past Surgical History:   Procedure Laterality Date    ADENOIDECTOMY      CHOLECYSTECTOMY  1998    COLONOSCOPY      with biopsy    INSERTION OF TUNNELED CENTRAL VENOUS CATHETER (CVC) WITH SUBCUTANEOUS PORT N/A 3/24/2025    Procedure: [id number];  Surgeon: Vik Lynch Jr., MD;  Location: [facility];  Service: General;  Laterality: N/A;  Likely right    OCCLUSION, FALLOPIAN TUBE, USING DEVICE, BY VAGINAL OR SUPRAPUBIC APPROACH Bilateral     TONSILLECTOMY  1975       Time Tracking:     OT Date of Treatment: 07/18/25  OT Start Time: 0912  OT Stop  Time: 0938  OT Total Time (min): 26 min    Billable Minutes:Evaluation Moderate complexity - 15 minutes  Self Care/Home Management - 11 minutes    7/18/2025

## 2025-07-18 NOTE — PT/OT/SLP EVAL
Physical Therapy Evaluation    Patient Name:  Alma Rosa Disla   MRN:  [id number]    Recommendations:     Discharge therapy intensity: Low Intensity Therapy   Discharge Equipment Recommendations: walker, rolling   Barriers to discharge: Impaired mobility and Ongoing medical needs    Assessment:     Alma Rosa Disla is a 65 y.o. female admitted with a medical diagnosis of Pancytopenia 2/2 chemotherapy, concern for sepsis/neutropenic fever, hypoMg, generalized weakness with fall at home; hx of L breast CA.  She presents with the following impairments/functional limitations: weakness, impaired endurance, impaired functional mobility, gait instability, impaired balance, decreased lower extremity function, impaired cardiopulmonary response to activity . Pt required standby assist for bed mobility and transfers, CGA for ambulation with RW. Demo's (B) knee buckling L>R with short distance ambulation in room with RW 2/2 LE weakness. Will have assistance from son and  at home. Recommend low intensity therapy and RW use to facilitate return to independent PLOF following this hospitalization.    DME Justification:   Alma Rosa's mobility limitation cannot be sufficiently resolved by the use of a cane. Her functional mobility deficit can be sufficiently resolved with the use of a Rolling Walker. Patient's mobility limitation significantly impairs their ability to participate in one of more activities of daily living.  The use of a RW will significantly improve the patient's ability to participate in MRADLS and the patient will use it on regular basis in the home.    Rehab Prognosis: Good; patient would benefit from acute skilled PT services to address these deficits and reach maximum level of function.    Recent Surgery: * No surgery found *      Plan:     During this hospitalization, patient would benefit from acute PT services 5 x/week to address the identified rehab impairments via gait training, therapeutic activities,  therapeutic exercises, neuromuscular re-education and progress toward the following goals:    Plan of Care Expires:  08/18/25    Subjective     Chief Complaint: weakness  Patient/Family Comments/goals: PLOF  Pain/Comfort:  Pain Rating 1: 0/10    Patients cultural, spiritual, Latter-day conflicts given the current situation: no    Living Environment:  Pt lives with her  and son in a [place] without steps.  Prior to admission, patients level of function was independent.  Equipment used at home: shower chair, rollator.  DME owned (not currently used): none.  Upon discharge, patient will have assistance from spouse and son.    Objective:     Communicated with RN prior to session.  Patient found HOB elevated with PureWick, telemetry  upon PT entry to room.    General Precautions: Standard, fall, contact, neutropenic  Orthopedic Precautions:    Braces:    Respiratory Status: Room air  Blood Pressure: 118/57      Exams:  RLE Strength: 5/5 gross  LLE Strength: 5/5 gross  Skin integrity: skin abrasions L knee, L greater troch, bruising to R knee       Functional Mobility:  Bed Mobility:     Supine to Sit: independence  Transfers:     Sit to Stand:  stand by assistance with rolling walker  Gait: 20ft with RW with CGA, (B) knee buckling L>R, 2 minor LOB requiring Kaity to correct  Balance: static standing balance = SBA with RW      AM-PAC 6 CLICK MOBILITY  Total Score:20     Co-Treatment: No    Treatment & Education:      Patient provided with verbal education education regarding PT role/goals/POC, fall prevention, safety awareness, and discharge/DME recommendations.  Understanding was verbalized.     Patient left up in chair with all lines intact, call button in reach, and RN notified.      GOALS:   Multidisciplinary Problems       Physical Therapy Goals          Problem: Physical Therapy    Goal Priority Disciplines Outcome Interventions   Physical Therapy Goal     PT, PT/OT Progressing    Description: Goals to be met by:  25     Patient will increase functional independence with mobility by performin. Sit to stand transfer with [place]  2. Bed to chair transfer with Modified [place] using Rolling Walker  3. Gait  x 150 feet with Modified [place] using Rolling Walker.                          History:     Past Medical History:   Diagnosis Date    Breast cancer     left    Hyperlipidemia     Menopause        Past Surgical History:   Procedure Laterality Date    ADENOIDECTOMY      CHOLECYSTECTOMY      COLONOSCOPY      with biopsy    INSERTION OF TUNNELED CENTRAL VENOUS CATHETER (CVC) WITH SUBCUTANEOUS PORT N/A 3/24/2025    Procedure: [id number];  Surgeon: Vik Lynch Jr., MD;  Location: [facility];  Service: General;  Laterality: N/A;  Likely right    OCCLUSION, FALLOPIAN TUBE, USING DEVICE, BY VAGINAL OR SUPRAPUBIC APPROACH Bilateral     TONSILLECTOMY  1975       Time Tracking:     PT Received On: 25  PT Start Time: 856     PT Stop Time: 913  PT Total Time (min): 17 min     Billable Minutes: Evaluation MOD      2025

## 2025-07-18 NOTE — PROGRESS NOTES
Pharmacist Renal Dose Adjustment Note    Alma Rosa Disla is a 65 y.o. female being treated with the medication cefepime    Patient Data:    Vital Signs (Most Recent):  Temp: 100.3 °F (37.9 °C) (07/18/25 0811)  Pulse: 102 (07/18/25 0811)  Resp: 18 (07/17/25 2114)  BP: 100/63 (07/18/25 0811)  SpO2: (!) 94 % (07/18/25 0811) Vital Signs (72h Range):  Temp:  [98 °F (36.7 °C)-100.3 °F (37.9 °C)]   Pulse:  [101-128]   Resp:  [11-25]   BP: ()/(40-83)   SpO2:  [91 %-99 %]      Recent Labs   Lab 07/17/25  0933 07/18/25  0532   CREATININE 1.23* 0.79     Serum creatinine: 0.79 mg/dL 07/18/25 0532  Estimated creatinine clearance: 78 mL/min    Medication:cefepime dose: 2g frequency q12h will be changed to medication:cefepime dose:2g frequency:q8h.    Pharmacist's Name: Ángela Echevarria  Pharmacist's Extension: [phone]

## 2025-07-18 NOTE — PLAN OF CARE
Goals to be met by: 08/18/2025     Patient will increase functional independence with ADLs by performing:    UE Dressing with Modified [place].  LE Dressing with Modified [place].  Grooming while standing with Modified [place].  Toileting from toilet with Modified [place] for hygiene and clothing management.   Toilet transfer to toilet with Modified [place].

## 2025-07-18 NOTE — CONSULTS
[facility] 9th Floor Medical Telemetry  Wound Care    Patient Name:  Alma Rosa Disla   MRN:  [id number]  Date: 7/18/2025  Diagnosis: Invasive ductal carcinoma of left breast    History:     Past Medical History:   Diagnosis Date    Breast cancer     left    Hyperlipidemia     Menopause 4/27/20020       Social History[1]    Precautions:     Allergies as of 07/17/2025    (No Known Allergies)       WOC Assessment Details/Treatment        07/18/25 0930   WOCN Assessment   Visit Date 07/18/25   Visit Time 0930   Consult Type New   WO Speciality Wound   Wound other  (burn from hot tea 1 month ago per patient)   Intervention chart review;assessed;applied;orders   Teaching on-going        Wound 07/18/25 0800 Other (comment) Left lateral Thigh   Date First Assessed/Time First Assessed: 07/18/25 0800   Primary Wound Type: (c) Other (comment)  Side: Left  Orientation: lateral  Location: Thigh   Wound Image    Dressing Appearance Open to air   Drainage Amount None   Drainage Characteristics/Odor No odor   Appearance Pink;Red;Dry   Tissue loss description Partial thickness   Black (%), Wound Tissue Color 0 %   Red (%), Wound Tissue Color 100 %   Yellow (%), Wound Tissue Color 0 %   Periwound Area Intact;Redness   Wound Edges Irregular   Wound Length (cm) 4 cm   Wound Width (cm) 4 cm   Wound Depth (cm) 0 cm   Wound Volume (cm^3) 0 cm^3   Wound Surface Area (cm^2) 12.57 cm^2   Supply Surface Area (L x W) 16 sq cm   Care Cleansed with:;Wound cleanser   Dressing Applied  (adaptic, foam)        Wound 07/18/25 0800 Other (comment) Left lateral Ankle   Date First Assessed/Time First Assessed: 07/18/25 0800   Primary Wound Type: (c) Other (comment)  Side: Left  Orientation: lateral  Location: Ankle   Wound Image    Dressing Appearance Open to air   Drainage Amount None   Drainage Characteristics/Odor No odor   Appearance Pink;Red;Dry   Tissue loss description Partial thickness   Black (%), Wound Tissue Color 0 %   Red  (%), Wound Tissue Color 100 %   Yellow (%), Wound Tissue Color 0 %   Periwound Area Dry   Wound Edges Callused   Wound Length (cm) 1.7 cm   Wound Width (cm) 3 cm   Wound Depth (cm) 0.1 cm   Wound Volume (cm^3) 0.267 cm^3   Wound Surface Area (cm^2) 4.01 cm^2   Care Cleansed with:;Wound cleanser   Dressing Applied  (adaptic, foam)     WOCN consulted for left thigh, also assessed pt.'s left lateral ankle. Pt. Sitting in chair, just worked with PT. Pt. Stated these wounds happened from hot tea being spilled a month ago. Pt. Is currently on chemo. Treatment recommendations: left lateral thigh/left lateral ankle: clean w/ NS, dry well, apply adaptic oiled gauze to wound bed and cover with foam. Change daily. Educated pt. On keeping wounds clean and dry. Gave pt. Hibiclens soap to use to wash with at home. Nursing to cont. Tx recs and preventative measures. Will follow up.     07/18/2025         [1]   Social History  Socioeconomic History    Marital status:    Tobacco Use    Smoking status: Never     Passive exposure: Never    Smokeless tobacco: Never   Substance and Sexual Activity    Alcohol use: Not Currently     Comment: I ONLY WINE MAYBE TWICE year    Drug use: Never    Sexual activity: Yes     Partners: Male     Social Drivers of Health     Financial Resource Strain: Low Risk  (7/18/2025)    Overall Financial Resource Strain (CARDIA)     Difficulty of Paying Living Expenses: Not very hard   Food Insecurity: No Food Insecurity (7/18/2025)    Hunger Vital Sign     Worried About Running Out of Food in the Last Year: Never true     Ran Out of Food in the Last Year: Never true   Transportation Needs: No Transportation Needs (7/18/2025)    PRAPARE - Transportation     Lack of Transportation (Medical): No     Lack of Transportation (Non-Medical): No   Physical Activity: Inactive (7/18/2025)    Exercise Vital Sign     Days of Exercise per Week: 0 days     Minutes of Exercise per Session: 0 min   Stress: No Stress  Concern Present (7/18/2025)    [facility] [place] - Occupational Stress Questionnaire     Feeling of Stress : Not at all   Housing Stability: Low Risk  (7/18/2025)    Housing Stability Vital Sign     Unable to Pay for Housing in the Last Year: No     Homeless in the Last Year: No

## 2025-07-18 NOTE — PROGRESS NOTES
Pharmacokinetic Assessment Follow Up: IV Vancomycin    Vancomycin serum concentration assessment(s):    The random level was drawn correctly and can be used to guide therapy at this time. The measurement is below the desired definitive target range of 15 to 20 mcg/mL.    ANGELA resolved: SrC changed from 1.23 mg/dL to 0.79 mg/dL in 24 hours.      Vancomycin Regimen Plan:    Change regimen to Vancomycin 1000 mg IV every 12 hours with next serum trough concentration measured at 0900 prior to 3rd dose on 07/19.    Drug levels (last 3 results):  Recent Labs   Lab Result Units 07/18/25  0532   Vancomycin Random ug/ml 12.1*       Pharmacy will continue to follow and monitor vancomycin.    Please contact pharmacy at extension [phone] for questions regarding this assessment.    Thank you for the consult,   Ángela Echevarria       Patient brief summary:  Alma Rosa Disla is a 65 y.o. female initiated on antimicrobial therapy with IV Vancomycin for treatment of neutropenic fever    The patient's current regimen is pulse does.    Drug Allergies:   Review of patient's allergies indicates:  No Known Allergies    Actual Body Weight:   98.9kg    Renal Function:   Estimated Creatinine Clearance: 78 mL/min (based on SCr of 0.79 mg/dL).,     Dialysis Method (if applicable):  N/A    CBC (last 72 hours):  Recent Labs   Lab Result Units 07/17/25  0933 07/18/25  0532   WBC x10(3)/mcL 0.21  0.21* 0.54  0.54*   Hgb g/dL 6.6* 8.7*   Hct % 20.4* 26.5*   Platelet x10(3)/mcL 68* 44*   Monocytes % % 0 13   Eosinophils % %  --  2   Basophils % %  --  6       Metabolic Panel (last 72 hours):  Recent Labs   Lab Result Units 07/17/25  0933 07/17/25  1357 07/18/25  0532   Sodium mmol/L 136  --  136   Potassium mmol/L 4.2  --  3.2*   Chloride mmol/L 100  --  104   CO2 mmol/L 24  --  24   Glucose mg/dL 148*  --  96   Glucose, UA   --  Normal  --    Blood Urea Nitrogen mg/dL 22.7*  --  15.8   Creatinine mg/dL 1.23*  --  0.79   Albumin g/dL 2.7*  --  2.3*    Bilirubin Total mg/dL 0.8  --  0.6   ALP unit/L 131  --  118   AST unit/L 12  --  10*   ALT unit/L 18  --  16   Magnesium Level mg/dL 1.40*  --  2.20       Vancomycin Administrations:  vancomycin given in the last 96 hours                     vancomycin (VANCOCIN) 1,000 mg in D5W 250 mL IVPB (admixture device) (mg) 1,000 mg New Bag 07/17/25 1337    vancomycin (VANCOCIN) 1,000 mg in D5W 250 mL IVPB (admixture device) (mg) 1,000 mg New Bag 07/17/25 1156                    Microbiologic Results:  Microbiology Results (last 7 days)       Procedure Component Value Units Date/Time    Blood Culture [[id number]] Collected: 07/17/25 1807    Order Status: Resulted Specimen: Blood Updated: 07/17/25 1948    Blood Culture [[id number]] Collected: 07/17/25 1815    Order Status: Resulted Specimen: Blood Updated: 07/17/25 1904    Stool Culture [[id number]]     Order Status: Sent Specimen: Stool     Clostridium Diff Toxin, A & B, EIA [[id number]]     Order Status: Sent Specimen: Stool

## 2025-07-19 LAB
ABS NEUT (OLG): 1.5 X10(3)/MCL (ref 2.1–9.2)
ANION GAP SERPL CALC-SCNC: 6 MEQ/L
ANISOCYTOSIS BLD QL SMEAR: ABNORMAL
B-HCG SERPL QL: 1 %
BASOPHILS NFR BLD MANUAL: 0.05 X10(3)/MCL (ref 0–0.2)
BASOPHILS NFR BLD MANUAL: 2 %
BUN SERPL-MCNC: 13.1 MG/DL (ref 9.8–20.1)
CALCIUM SERPL-MCNC: 8.3 MG/DL (ref 8.4–10.2)
CHLORIDE SERPL-SCNC: 105 MMOL/L (ref 98–107)
CO2 SERPL-SCNC: 23 MMOL/L (ref 23–31)
CREAT SERPL-MCNC: 0.69 MG/DL (ref 0.55–1.02)
CREAT/UREA NIT SERPL: 19
ERYTHROCYTE [DISTWIDTH] IN BLOOD BY AUTOMATED COUNT: 18.6 % (ref 11.5–17)
GFR SERPLBLD CREATININE-BSD FMLA CKD-EPI: >60 ML/MIN/1.73/M2
GLUCOSE SERPL-MCNC: 102 MG/DL (ref 82–115)
HCT VFR BLD AUTO: 25.9 % (ref 37–47)
HGB BLD-MCNC: 8.7 G/DL (ref 12–16)
INSTRUMENT WBC (OLG): 2.58 X10(3)/MCL
LYMPHOCYTES NFR BLD MANUAL: 0.67 X10(3)/MCL (ref 0.6–4.6)
LYMPHOCYTES NFR BLD MANUAL: 26 %
MCH RBC QN AUTO: 30.6 PG (ref 27–31)
MCHC RBC AUTO-ENTMCNC: 33.6 G/DL (ref 33–36)
MCV RBC AUTO: 91.2 FL (ref 80–94)
METAMYELOCYTES NFR BLD MANUAL: 1 %
MONOCYTES NFR BLD MANUAL: 0.28 X10(3)/MCL (ref 0.1–1.3)
MONOCYTES NFR BLD MANUAL: 11 %
NEUTROPHILS NFR BLD MANUAL: 58 %
NRBC BLD MANUAL-RTO: 4 %
PLATELET # BLD AUTO: 54 X10(3)/MCL (ref 130–400)
PLATELET # BLD EST: ABNORMAL 10*3/UL
PMV BLD AUTO: 12.5 FL (ref 7.4–10.4)
POIKILOCYTOSIS BLD QL SMEAR: ABNORMAL
POLYCHROMASIA BLD QL SMEAR: ABNORMAL
POTASSIUM SERPL-SCNC: 3.5 MMOL/L (ref 3.5–5.1)
PROMYELOCYTES # BLD MANUAL: 2 %
RBC # BLD AUTO: 2.84 X10(6)/MCL (ref 4.2–5.4)
RBC MORPH BLD: ABNORMAL
SODIUM SERPL-SCNC: 134 MMOL/L (ref 136–145)
TOXIC GRANULES BLD QL SMEAR: ABNORMAL
VANCOMYCIN TROUGH SERPL-MCNC: 17.7 UG/ML (ref 15–20)
WBC # BLD AUTO: 2.58 X10(3)/MCL (ref 4.5–11.5)

## 2025-07-19 PROCEDURE — 80202 ASSAY OF VANCOMYCIN: CPT | Performed by: INTERNAL MEDICINE

## 2025-07-19 PROCEDURE — 36415 COLL VENOUS BLD VENIPUNCTURE: CPT | Performed by: INTERNAL MEDICINE

## 2025-07-19 PROCEDURE — 27000207 HC ISOLATION

## 2025-07-19 PROCEDURE — 99232 SBSQ HOSP IP/OBS MODERATE 35: CPT | Mod: ,,, | Performed by: INTERNAL MEDICINE

## 2025-07-19 PROCEDURE — 63600175 PHARM REV CODE 636 W HCPCS: Performed by: INTERNAL MEDICINE

## 2025-07-19 PROCEDURE — 85025 COMPLETE CBC W/AUTO DIFF WBC: CPT | Performed by: INTERNAL MEDICINE

## 2025-07-19 PROCEDURE — 21400001 HC TELEMETRY ROOM

## 2025-07-19 PROCEDURE — 80048 BASIC METABOLIC PNL TOTAL CA: CPT | Performed by: INTERNAL MEDICINE

## 2025-07-19 PROCEDURE — 25000003 PHARM REV CODE 250: Performed by: INTERNAL MEDICINE

## 2025-07-19 RX ORDER — ENOXAPARIN SODIUM 100 MG/ML
40 INJECTION SUBCUTANEOUS EVERY 24 HOURS
Status: COMPLETED | OUTPATIENT
Start: 2025-07-19 | End: 2025-07-19

## 2025-07-19 RX ADMIN — PANTOPRAZOLE SODIUM 20 MG: 20 TABLET, DELAYED RELEASE ORAL at 09:07

## 2025-07-19 RX ADMIN — CEFEPIME 2 G: 2 INJECTION, POWDER, FOR SOLUTION INTRAVENOUS at 02:07

## 2025-07-19 RX ADMIN — DULOXETINE 60 MG: 30 CAPSULE, DELAYED RELEASE ORAL at 09:07

## 2025-07-19 RX ADMIN — GABAPENTIN 100 MG: 100 CAPSULE ORAL at 09:07

## 2025-07-19 RX ADMIN — OLANZAPINE 5 MG: 5 TABLET, FILM COATED ORAL at 09:07

## 2025-07-19 RX ADMIN — ENOXAPARIN SODIUM 40 MG: 40 INJECTION SUBCUTANEOUS at 06:07

## 2025-07-19 RX ADMIN — VANCOMYCIN HYDROCHLORIDE 1000 MG: 1 INJECTION, POWDER, LYOPHILIZED, FOR SOLUTION INTRAVENOUS at 10:07

## 2025-07-19 RX ADMIN — MUPIROCIN: 20 OINTMENT TOPICAL at 09:07

## 2025-07-19 RX ADMIN — VANCOMYCIN HYDROCHLORIDE 1000 MG: 1 INJECTION, POWDER, LYOPHILIZED, FOR SOLUTION INTRAVENOUS at 09:07

## 2025-07-19 RX ADMIN — CEFEPIME 2 G: 2 INJECTION, POWDER, FOR SOLUTION INTRAVENOUS at 09:07

## 2025-07-19 RX ADMIN — GABAPENTIN 100 MG: 100 CAPSULE ORAL at 02:07

## 2025-07-19 RX ADMIN — CEFEPIME 2 G: 2 INJECTION, POWDER, FOR SOLUTION INTRAVENOUS at 05:07

## 2025-07-19 NOTE — PROGRESS NOTES
[facility] Hospital Medicine Progress Note        Chief Complaint: Inpatient Follow-up     HPI:   Alma Rosa Disla is a 65 y.o. female who  has a past medical history of recent T2 N0 grade 3 triple negative breast carcinoma who was on treatment with (last therapy was on July 10th.  did receive growth factors; was treated with Adriamycin Cytoxan on July 10th), Hyperlipidemia.      The patient presented to [facility] on 7/17/2025 with a primary complaint of weakness.      Has had worsening feeling weakness and dizziness.  She fell prior to presentation and then she got significantly weak was brought into the emergency room. She denied any fever but was having chills and sweats.  She has had 2 loose stools.  No blood in his stool, globally weak, no more headaches or visual changes, no cough or shortness of breath.  Globally weak no focal weakness has chronic neuropathy has been on gabapentin in the past. No sick contacts at home.     She was found to be tachycardic hypotensive. Was started on IV fluids vancomycin and cefepime     Admitted to  service. Oncology on consult.    7/18 Tmax 100.3. NAEON. Somewhat feeling better today.    Interval Hx:     AF. NAEON. Feeling better. Tentatively plan dc in am if stable.    Case was discussed with patient's nurse and  on the floor.    Objective/physical exam:  General: In no acute distress, afebrile  Chest: Clear to auscultation bilaterally  Subclavian MediPort in place, no tenderness or redness   Heart: RRR, +S1, S2, no appreciable murmur  Abdomen: Soft, nontender, BS +  MSK: Warm, no lower extremity edema, no clubbing or cyanosis  Neurologic: Alert and oriented, moving all extremities with good strength    VITAL SIGNS: 24 HRS MIN & MAX LAST   Temp  Min: 98.2 °F (36.8 °C)  Max: 99.1 °F (37.3 °C) 98.2 °F (36.8 °C)   BP  Min: 99/53  Max: 128/65 128/65   Pulse  Min: 95  Max: 117  106   Resp  Min: 16  Max: 16 16   SpO2  Min: 92 %  Max: 98 %  (!) 94 %     I have reviewed the following labs:  Recent Labs   Lab 07/17/25 0933 07/18/25  0532 07/19/25  0753   WBC 0.21  0.21* 0.54  0.54* 2.58*   RBC 2.05* 2.91* 2.84*   HGB 6.6* 8.7* 8.7*   HCT 20.4* 26.5* 25.9*   MCV 99.5* 91.1 91.2   MCH 32.2* 29.9 30.6   MCHC 32.4* 32.8* 33.6   RDW 17.2* 18.2* 18.6*   PLT 68* 44* 54*   MPV 11.8* 11.8* 12.5*     Recent Labs   Lab 07/17/25 0933 07/18/25  0532 07/19/25  0753    136 134*   K 4.2 3.2* 3.5    104 105   CO2 24 24 23   BUN 22.7* 15.8 13.1   CREATININE 1.23* 0.79 0.69   * 96 102   CALCIUM 8.8 8.5 8.3*   MG 1.40* 2.20  --    ALBUMIN 2.7* 2.3*  --    PROT 6.4 5.9  --    ALKPHOS 131 118  --    ALT 18 16  --    AST 12 10*  --    BILITOT 0.8 0.6  --      Microbiology Results (last 7 days)       Procedure Component Value Units Date/Time    Blood Culture [[id number]]  (Normal) Collected: 07/17/25 1807    Order Status: Completed Specimen: Blood Updated: 07/18/25 2001     Blood Culture No Growth At 24 Hours    Blood Culture [[id number]]  (Normal) Collected: 07/17/25 1815    Order Status: Completed Specimen: Blood Updated: 07/18/25 2001     Blood Culture No Growth At 24 Hours    Stool Culture [[id number]]     Order Status: Sent Specimen: Stool     Clostridium Diff Toxin, A & B, EIA [[id number]]     Order Status: Canceled Specimen: Stool              See below for Radiology    Assessment/Plan:    Concern for Sepsis 2/2 below  Neutropenic fever, unclear source  Pancytopenia secondary to chemotherapy   Breast carcinoma      IDC left breast, biopsy 01/27/2025, triple negative, overall grade 3; cT2 cN0 MX, AJCC anatomic stage at least IIA, clinical prognostic stage at least IIB   Acute kidney injury  HypoMg     Hx of Hyperlipidemia, postmenopausal, sensorineural hearing loss, psoriatic arthritis, tinnitus, neuropathy     UA neg, CXR No acute cardiopulmonary process identified.      Plan  Continue Vancomcyin and cefepime  F/u cx until finalized; f/u  cdiff      WBC 0.21>>0.5>>2.5, plt 62>>44>>54, 2 unit PRBC 7/17/25  G-CSF on July 11th   Oncology  Neutropenic isolation   Keep hemoglobin greater than 7 unless more symptomatic than greater than 8   Keep platelets greater than 10,000 unless bleeding   Okay with Lovenox if platelets> 50 and no clinical evidence of bleeding      Acute kidney injury, Cr 1.2, baseline 0.8, monitor   Lactate 2.7 >>2.7>>normal, trop x 1 neg  S/p LR 3L per sepsis protocol  HypoMg, replace as needed     Resume home meds as appropriate  Labs am    VTE Prophylaxis: Lovenox if plt > 50/ SCD for DVT prophylaxis and will be advised to be as mobile as possible and sit in a chair as tolerated     Patient condition:  Fair      Anticipated discharge and Disposition:         All diagnosis and differential diagnosis have been reviewed; assessment and plan has been documented; I have personally reviewed the labs and test results that are presently available; I have reviewed the patients medication list; I have reviewed the consulting providers response and recommendations. I have reviewed or attempted to review medical records based upon their availability    All of the patient's questions have been  addressed and answered. Patient's is agreeable to the above stated plan. I will continue to monitor closely and make adjustments to medical management as needed.    Portions of this note dictated using EMR integrated voice recognition software, and may be subject to voice recognition errors not corrected at proofreading. Please contact writer for clarification if needed.   _____________________________________________________________________    Malnutrition Status:  Nutrition consulted. Most recent weight and BMI monitored-     Measurements:  Wt Readings from Last 1 Encounters:   07/18/25 98.9 kg (218 lb 0.6 oz)   Body mass index is 39.87 kg/m².    Patient has been screened and assessed by RD.    Malnutrition Type:  Context:    Level:      Malnutrition  Characteristic Summary:       Interventions/Recommendations (treatment strategy):        Scheduled Med:   ceFEPime IV (PEDS and ADULTS)  2 g Intravenous Q8H    DULoxetine  60 mg Oral Daily    gabapentin  100 mg Oral TID    mupirocin   Nasal BID    OLANZapine  5 mg Oral Daily    pantoprazole  20 mg Oral Daily    vancomycin (VANCOCIN) 1,000 mg in D5W 250 mL IVPB (admixture device)  1,000 mg Intravenous Q12H      Continuous Infusions:     PRN Meds:    Current Facility-Administered Medications:     0.9%  NaCl infusion (for blood administration), , Intravenous, Q24H PRN    acetaminophen, 1,000 mg, Oral, Q6H PRN    acetaminophen, 650 mg, Oral, Q4H PRN    dextrose 50%, 12.5 g, Intravenous, PRN    dextrose 50%, 25 g, Intravenous, PRN    glucagon (human recombinant), 1 mg, Intramuscular, PRN    glucose, 16 g, Oral, PRN    glucose, 24 g, Oral, PRN    naloxone, 0.02 mg, Intravenous, PRN    ondansetron, 4 mg, Intravenous, Q4H PRN    prochlorperazine, 5 mg, Intravenous, Q6H PRN    sodium chloride 0.9%, 10 mL, Intravenous, PRN    vancomycin - pharmacy to dose, , Intravenous, pharmacy to manage frequency     Radiology:  I have personally reviewed the following imaging and agree with the radiologist.     X-Ray Chest PA And Lateral  Narrative: EXAMINATION:  XR CHEST PA AND LATERAL    CLINICAL HISTORY:  Sepsis;    TECHNIQUE:  Two-view    COMPARISON:  March 24, 2025.    FINDINGS:  Cardiopericardial silhouette is within normal limits.  Right chest implanted tunnel tamy catheter terminates within the superior vena cava.  No acute dense focal or segmental consolidation, congestion, pleural effusion or pneumothorax.  Impression: No acute cardiopulmonary process identified.    Electronically signed by: Christo Arteaga  Date:    07/17/2025  Time:    14:22      Ever Joe MD  Department of Hospital Medicine   [facility]   07/19/2025

## 2025-07-19 NOTE — PROGRESS NOTES
Pharmacokinetic Assessment Follow Up: IV Vancomycin    Vancomycin serum concentration assessment(s):    The trough level was drawn correctly and can be used to guide therapy at this time. The measurement is within the desired definitive target range of 15 to 20 mcg/mL.    Vancomycin Regimen Plan:    Continue regimen to Vancomycin 1000 mg IV every 12 hours with next serum trough concentration measured at 2100 prior to 2200 dose on 7/20    Scheduled Administration Times        Drug levels (last 3 results):  Recent Labs   Lab Result Units 07/18/25  0532 07/19/25  0753   Vancomycin Random ug/ml 12.1*  --    Vancomycin Trough ug/ml  --  17.7       Vancomycin Administrations:  vancomycin given in the last 96 hours                     vancomycin (VANCOCIN) 1,000 mg in D5W 250 mL IVPB (admixture device) (mg) 1,000 mg New Bag 07/18/25 2208     1,000 mg New Bag  1108    vancomycin (VANCOCIN) 1,000 mg in D5W 250 mL IVPB (admixture device) (mg) 1,000 mg New Bag 07/17/25 1337    vancomycin (VANCOCIN) 1,000 mg in D5W 250 mL IVPB (admixture device) (mg) 1,000 mg New Bag 07/17/25 1156                    Pharmacy will continue to follow and monitor vancomycin.    Please contact pharmacy at extension [phone] for questions regarding this assessment.    Thank you for the consult,   Halima Gallagher       Patient brief summary:  Alma Rosa Disla is a 65 y.o. female initiated on antimicrobial therapy with IV Vancomycin for treatment of neutropenic fever    The patient's current regimen is 1000 mg q12h    Drug Allergies:   Review of patient's allergies indicates:  No Known Allergies    Actual Body Weight:  Wt Readings from Last 1 Encounters:   07/18/25 98.9 kg (218 lb 0.6 oz)       Renal Function:   Estimated Creatinine Clearance: 89.3 mL/min (based on SCr of 0.69 mg/dL).,     Dialysis Method (if applicable):  N/A    CBC (last 72 hours):  Recent Labs   Lab Result Units 07/17/25  0933 07/18/25  0532 07/19/25  0753   WBC x10(3)/mcL 0.21  0.21*  0.54  0.54* 2.58*   Hgb g/dL 6.6* 8.7* 8.7*   Hct % 20.4* 26.5* 25.9*   Platelet x10(3)/mcL 68* 44* 54*   Monocytes % % 0 13  --    Eosinophils % %  --  2  --    Basophils % %  --  6  --        Metabolic Panel (last 72 hours):  Recent Labs   Lab Result Units 07/17/25  0933 07/17/25  1357 07/18/25  0532 07/19/25  0753   Sodium mmol/L 136  --  136 134*   Potassium mmol/L 4.2  --  3.2* 3.5   Chloride mmol/L 100  --  104 105   CO2 mmol/L 24  --  24 23   Glucose mg/dL 148*  --  96 102   Glucose, UA   --  Normal  --   --    Blood Urea Nitrogen mg/dL 22.7*  --  15.8 13.1   Creatinine mg/dL 1.23*  --  0.79 0.69   Albumin g/dL 2.7*  --  2.3*  --    Bilirubin Total mg/dL 0.8  --  0.6  --    ALP unit/L 131  --  118  --    AST unit/L 12  --  10*  --    ALT unit/L 18  --  16  --    Magnesium Level mg/dL 1.40*  --  2.20  --        Microbiologic Results:  Microbiology Results (last 7 days)       Procedure Component Value Units Date/Time    Blood Culture [[id number]]  (Normal) Collected: 07/17/25 1807    Order Status: Completed Specimen: Blood Updated: 07/18/25 2001     Blood Culture No Growth At 24 Hours    Blood Culture [[id number]]  (Normal) Collected: 07/17/25 1815    Order Status: Completed Specimen: Blood Updated: 07/18/25 2001     Blood Culture No Growth At 24 Hours    Stool Culture [[id number]]     Order Status: Sent Specimen: Stool     Clostridium Diff Toxin, A & B, EIA [[id number]]     Order Status: Canceled Specimen: Stool

## 2025-07-19 NOTE — PROGRESS NOTES
[facility] - Oncology Acute  Hematology/Oncology  Consult Note    Patient Name: Alma Rosa Disla  MRN: [id number]  Admission Date: 7/17/2025  Hospital Length of Stay: 2 days  Attending Provider: Ever Joe MD  Consulting Provider: Dorie Smith MD  Principal Problem:Invasive ductal carcinoma of left breast    Consults  Subjective:     HPI:  This is a 65-year-old female with a recent T2 N0 grade 3 triple negative breast carcinoma who was on treatment with   Her last therapy was on July 10th.  She did receive growth factors as well.    She was treated with Adriamycin Cytoxan on July 10th  Patient reports she slept for a couple of days.  Then she was getting up feeling weak and dizzy.  She fell.  And then she got significantly weak was brought into the emergency room  She denied any fever but was having chills and sweats.  She was found to be tachycardic hypotensive with blood pressures 89/40 and a heart rate of 120.  She would then have acute kidney injury    Was started on IV fluids vancomycin    We are called by the ER.  Because she had growth factors and did not recommend growth factors at this point.    Agree with broad-spectrum antibiotics and sepsis control.    The patient was seen and examined.  She has had 2 loose stools.  No blood in his stool, globally weak.  Rigors and chills, no more headaches or visual changes.  Dry mouth, no cough or shortness of breath.  Globally weak no focal weakness has chronic neuropathy has been on gabapentin in the past    No sick contacts at home.      07/18/2025:   Patient on the floor.    No chills no sweats   ate breakfast this morning.  Feels better.  No pain dry mouth improved.  Starting to urinate.  Positive flatus no bowel movement.    Still with temp 100.3°.  Systolic blood pressure 100/63, O2 sat 94%    Today 7/19/2025:  Patient is seen and examined this morning.  She is feeling better and voices no concerns. Leukopenia improving with WBC  2.58 today. Platelets 54,000.  No fever, chills, sweats.  No chest pain or short of breath.    Oncology Treatment Plan:   OP BREAST PEMBROLIZUMAB CARBOPLATIN (AUC 5) WITH WEEKLY PACLITAXEL FOLLOWED BY PEMBROLIZUMAB DOXORUBICIN CYCLOPHOSPHAMIDE FOLLOWED BY PEMBROLIZUMAB 200 MG Q3W    Medications:  Continuous Infusions:    Scheduled Meds:   ceFEPime IV (PEDS and ADULTS)  2 g Intravenous Q8H    DULoxetine  60 mg Oral Daily    gabapentin  100 mg Oral TID    mupirocin   Nasal BID    OLANZapine  5 mg Oral Daily    pantoprazole  20 mg Oral Daily    vancomycin (VANCOCIN) 1,000 mg in D5W 250 mL IVPB (admixture device)  1,000 mg Intravenous Q12H     PRN Meds:  Current Facility-Administered Medications:     0.9%  NaCl infusion (for blood administration), , Intravenous, Q24H PRN    acetaminophen, 1,000 mg, Oral, Q6H PRN    acetaminophen, 650 mg, Oral, Q4H PRN    dextrose 50%, 12.5 g, Intravenous, PRN    dextrose 50%, 25 g, Intravenous, PRN    glucagon (human recombinant), 1 mg, Intramuscular, PRN    glucose, 16 g, Oral, PRN    glucose, 24 g, Oral, PRN    naloxone, 0.02 mg, Intravenous, PRN    ondansetron, 4 mg, Intravenous, Q4H PRN    prochlorperazine, 5 mg, Intravenous, Q6H PRN    sodium chloride 0.9%, 10 mL, Intravenous, PRN    vancomycin - pharmacy to dose, , Intravenous, pharmacy to manage frequency     Review of patient's allergies indicates:  No Known Allergies     Past Medical History:   Diagnosis Date    Breast cancer     left    Hyperlipidemia     Menopause 4/27/20020     Past Surgical History:   Procedure Laterality Date    ADENOIDECTOMY      CHOLECYSTECTOMY  1998    COLONOSCOPY      with biopsy    INSERTION OF TUNNELED CENTRAL VENOUS CATHETER (CVC) WITH SUBCUTANEOUS PORT N/A 3/24/2025    Procedure: [id number];  Surgeon: Vik Lynch Jr., MD;  Location: [facility];  Service: General;  Laterality: N/A;  Likely right    OCCLUSION, FALLOPIAN TUBE, USING DEVICE, BY VAGINAL OR SUPRAPUBIC APPROACH Bilateral      TONSILLECTOMY  1975     Family History       Problem Relation (Age of Onset)    Alzheimer's disease Mother    Arthritis Mother, Brother    Asbestos Paternal Uncle    Diabetes Mother, Sister, Brother, Brother, Maternal Grandmother    Epilepsy Brother    Heart disease Mother    Heart failure Father, Maternal Grandmother    Lung cancer Father    Osteoarthritis Maternal Aunt    Ovarian cancer Sister    Rashes / Skin problems Sister, Brother    Seizures Brother    Stroke Mother, Brother          Tobacco Use    Smoking status: Never     Passive exposure: Never    Smokeless tobacco: Never   Substance and Sexual Activity    Alcohol use: Not Currently     Comment: I ONLY WINE MAYBE TWICE year    Drug use: Never    Sexual activity: Yes     Partners: Male       Review of Systems pertinent positives above in interim history and daily note  Objective:     Vital Signs (Most Recent):  Temp: 98.2 °F (36.8 °C) (07/19/25 0743)  Pulse: 106 (07/19/25 0743)  Resp: 16 (07/18/25 2322)  BP: 128/65 (07/19/25 0743)  SpO2: (!) 94 % (07/19/25 0743) Vital Signs (24h Range):  Temp:  [98.2 °F (36.8 °C)-99.1 °F (37.3 °C)] 98.2 °F (36.8 °C)  Pulse:  [] 106  Resp:  [16] 16  SpO2:  [92 %-98 %] 94 %  BP: ()/(51-65) 128/65     Weight: 98.9 kg (218 lb 0.6 oz)  Body mass index is 39.87 kg/m².  Body surface area is 2.08 meters squared.      Intake/Output Summary (Last 24 hours) at 7/19/2025 0908  Last data filed at 7/19/2025 0425  Gross per 24 hour   Intake --   Output 550 ml   Net -550 ml       Physical Exam  Vitals reviewed.   Constitutional:       Appearance: She is not ill-appearing.   HENT:      Head: Normocephalic.      Nose: Nose normal.      Mouth/Throat:      Mouth: Mucous membranes are dry.      Pharynx: Oropharynx is clear.   Eyes:      Extraocular Movements: Extraocular movements intact.      Pupils: Pupils are equal, round, and reactive to light.   Cardiovascular:      Rate and Rhythm: Tachycardia present.      Pulses: Normal  "pulses.   Pulmonary:      Effort: Pulmonary effort is normal.   Chest:      Comments: Subclavian MediPort in place, no tenderness or redness    Abdominal:      General: Abdomen is flat. Bowel sounds are normal.   Musculoskeletal:      Cervical back: Neck supple.      Right lower leg: No edema.      Left lower leg: No edema.   Skin:     General: Skin is dry.      Comments: Patient has left lower extremity wound from tea-colored burn on the left lateral thigh.   Neurological:      General: No focal deficit present.      Mental Status: She is alert and oriented to person, place, and time.      Cranial Nerves: No cranial nerve deficit.   Psychiatric:         Mood and Affect: Mood normal.         Behavior: Behavior normal.         Thought Content: Thought content normal.         Judgment: Judgment normal.         Significant Labs:   CBC:   Recent Labs   Lab 07/17/25  0933 07/18/25  0532 07/19/25  0753   WBC 0.21  0.21* 0.54  0.54* 2.58*   HGB 6.6* 8.7* 8.7*   HCT 20.4* 26.5* 25.9*   PLT 68* 44* 54*   , CMP:   Recent Labs   Lab 07/17/25 0933 07/18/25  0532 07/19/25  0753    136 134*   K 4.2 3.2* 3.5    104 105   CO2 24 24 23   * 96 102   BUN 22.7* 15.8 13.1   CREATININE 1.23* 0.79 0.69   CALCIUM 8.8 8.5 8.3*   PROT 6.4 5.9  --    ALBUMIN 2.7* 2.3*  --    BILITOT 0.8 0.6  --    ALKPHOS 131 118  --    AST 12 10*  --    ALT 18 16  --    , Coagulation: No results for input(s): "PT", "INR", "APTT" in the last 48 hours., LDH: No results for input(s): "LDHCSF", "BFSOURCE" in the last 48 hours., Reticulocytes: No results for input(s): "RETIC" in the last 48 hours., and Uric Acid No results for input(s): "URICACID" in the last 48 hours.    Diagnostic Results:  Pending    Assessment/Plan:   Pancytopenia secondary to chemotherapy   Sepsis        Lactic acidosis   ---resolved          Hypotension--resolving          Tachycardia---still present          Rigors---          Acute kidney " injury---resolved  Neutropenic fever---hospital day 2, vancomycin cefepime day 2  Breast carcinoma      IDC left breast, biopsy 01/27/2025, triple negative, overall grade 3; cT2 cN0 MX, AJCC anatomic stage at least IIA, clinical prognostic stage at least IIB   Her skin 1 was present on admission.  She states he has been present for several weeks.  She spilled T hot tea on her leg in her ankle      Chronic medical problems: Hyperlipidemia, postmenopausal, sensorineural hearing loss, psoriatic arthritis, JENNIFER, tinnitus, neuropathy, skin wound    WBCs 2.5 8 with a ANC of 1.4964.  Thrombocytopenia improving      Recommendations  Neutropenic isolation -continue           She had G-CSF on July 11th her white count is slightly improving.  I do not think she needs anymore at this point. Keep hemoglobin greater than 7 unless more symptomatic than greater than 8   Keep platelets greater than 10,000 unless bleeding   Agree with broad-spectrum antibiotics vancomycin, cefepime  Daily labs for several days, then q.48h   SCDs,   Okay with Lovenox if platelets> 50 and no clinical evidence of bleeding           Platelets 54 today          Dorie Smith MD  Hematology/Oncology  [facility]  Oncology [facility]

## 2025-07-20 VITALS
TEMPERATURE: 97 F | RESPIRATION RATE: 21 BRPM | BODY MASS INDEX: 40.13 KG/M2 | HEART RATE: 113 BPM | DIASTOLIC BLOOD PRESSURE: 77 MMHG | WEIGHT: 218.06 LBS | SYSTOLIC BLOOD PRESSURE: 137 MMHG | HEIGHT: 62 IN | OXYGEN SATURATION: 92 %

## 2025-07-20 LAB
ABS NEUT (OLG): 7.87 X10(3)/MCL (ref 2.1–9.2)
ANION GAP SERPL CALC-SCNC: 14 MEQ/L
ANISOCYTOSIS BLD QL SMEAR: ABNORMAL
BUN SERPL-MCNC: 10.4 MG/DL (ref 9.8–20.1)
BURR CELLS (OLG): ABNORMAL
CALCIUM SERPL-MCNC: 8.8 MG/DL (ref 8.4–10.2)
CHLORIDE SERPL-SCNC: 103 MMOL/L (ref 98–107)
CO2 SERPL-SCNC: 22 MMOL/L (ref 23–31)
CREAT SERPL-MCNC: 0.66 MG/DL (ref 0.55–1.02)
CREAT/UREA NIT SERPL: 16
ERYTHROCYTE [DISTWIDTH] IN BLOOD BY AUTOMATED COUNT: 18.9 % (ref 11.5–17)
GFR SERPLBLD CREATININE-BSD FMLA CKD-EPI: >60 ML/MIN/1.73/M2
GLUCOSE SERPL-MCNC: 98 MG/DL (ref 82–115)
HCT VFR BLD AUTO: 26.5 % (ref 37–47)
HEMATOLOGIST REVIEW: NORMAL
HGB BLD-MCNC: 8.9 G/DL (ref 12–16)
INSTRUMENT WBC (OLG): 10.09 X10(3)/MCL
LYMPHOCYTES NFR BLD MANUAL: 0.4 X10(3)/MCL (ref 0.6–4.6)
LYMPHOCYTES NFR BLD MANUAL: 4 %
MCH RBC QN AUTO: 30.6 PG (ref 27–31)
MCHC RBC AUTO-ENTMCNC: 33.6 G/DL (ref 33–36)
MCV RBC AUTO: 91.1 FL (ref 80–94)
METAMYELOCYTES NFR BLD MANUAL: 4 %
MONOCYTES NFR BLD MANUAL: 0.91 X10(3)/MCL (ref 0.1–1.3)
MONOCYTES NFR BLD MANUAL: 9 %
MYELOCYTES NFR BLD MANUAL: 4 %
NEUTROPHILS NFR BLD MANUAL: 78 %
NRBC BLD MANUAL-RTO: 4 %
OHS QRS DURATION: 74 MS
OHS QTC CALCULATION: 433 MS
PLATELET # BLD AUTO: 96 X10(3)/MCL (ref 130–400)
PLATELET # BLD EST: ABNORMAL 10*3/UL
PMV BLD AUTO: 12.4 FL (ref 7.4–10.4)
POIKILOCYTOSIS BLD QL SMEAR: ABNORMAL
POTASSIUM SERPL-SCNC: 3.2 MMOL/L (ref 3.5–5.1)
PROMYELOCYTES # BLD MANUAL: 2 %
RBC # BLD AUTO: 2.91 X10(6)/MCL (ref 4.2–5.4)
RBC MORPH BLD: ABNORMAL
SODIUM SERPL-SCNC: 139 MMOL/L (ref 136–145)
STOMATOCYTES (OLG): ABNORMAL
TEAR DROP CELL (OLG): ABNORMAL
WBC # BLD AUTO: 10.09 X10(3)/MCL (ref 4.5–11.5)

## 2025-07-20 PROCEDURE — 63600175 PHARM REV CODE 636 W HCPCS

## 2025-07-20 PROCEDURE — 85025 COMPLETE CBC W/AUTO DIFF WBC: CPT | Performed by: INTERNAL MEDICINE

## 2025-07-20 PROCEDURE — 80048 BASIC METABOLIC PNL TOTAL CA: CPT | Performed by: INTERNAL MEDICINE

## 2025-07-20 PROCEDURE — 99232 SBSQ HOSP IP/OBS MODERATE 35: CPT | Mod: ,,, | Performed by: INTERNAL MEDICINE

## 2025-07-20 PROCEDURE — 25000003 PHARM REV CODE 250: Performed by: INTERNAL MEDICINE

## 2025-07-20 PROCEDURE — 63600175 PHARM REV CODE 636 W HCPCS: Performed by: INTERNAL MEDICINE

## 2025-07-20 PROCEDURE — 36415 COLL VENOUS BLD VENIPUNCTURE: CPT | Performed by: INTERNAL MEDICINE

## 2025-07-20 RX ORDER — CIPROFLOXACIN 500 MG/1
750 TABLET, FILM COATED ORAL EVERY 12 HOURS
Qty: 12 TABLET | Refills: 0 | Status: SHIPPED | OUTPATIENT
Start: 2025-07-20 | End: 2025-07-24

## 2025-07-20 RX ORDER — AMOXICILLIN AND CLAVULANATE POTASSIUM 875; 125 MG/1; MG/1
1 TABLET, FILM COATED ORAL 2 TIMES DAILY
Qty: 8 TABLET | Refills: 0 | Status: SHIPPED | OUTPATIENT
Start: 2025-07-20 | End: 2025-07-24

## 2025-07-20 RX ORDER — POTASSIUM CHLORIDE 20 MEQ/1
40 TABLET, EXTENDED RELEASE ORAL ONCE
Status: COMPLETED | OUTPATIENT
Start: 2025-07-20 | End: 2025-07-20

## 2025-07-20 RX ADMIN — DULOXETINE 60 MG: 30 CAPSULE, DELAYED RELEASE ORAL at 09:07

## 2025-07-20 RX ADMIN — CEFEPIME 2 G: 2 INJECTION, POWDER, FOR SOLUTION INTRAVENOUS at 06:07

## 2025-07-20 RX ADMIN — MUPIROCIN: 20 OINTMENT TOPICAL at 09:07

## 2025-07-20 RX ADMIN — POTASSIUM CHLORIDE EXTENDED-RELEASE 40 MEQ: 1500 TABLET ORAL at 09:07

## 2025-07-20 RX ADMIN — ONDANSETRON 4 MG: 2 INJECTION INTRAMUSCULAR; INTRAVENOUS at 06:07

## 2025-07-20 RX ADMIN — GABAPENTIN 100 MG: 100 CAPSULE ORAL at 09:07

## 2025-07-20 RX ADMIN — OLANZAPINE 5 MG: 5 TABLET, FILM COATED ORAL at 09:07

## 2025-07-20 RX ADMIN — PANTOPRAZOLE SODIUM 20 MG: 20 TABLET, DELAYED RELEASE ORAL at 09:07

## 2025-07-20 RX ADMIN — VANCOMYCIN HYDROCHLORIDE 1000 MG: 1 INJECTION, POWDER, LYOPHILIZED, FOR SOLUTION INTRAVENOUS at 09:07

## 2025-07-20 NOTE — PROGRESS NOTES
[facility] - Oncology Acute  Hematology/Oncology  Consult Note    Patient Name: Alma Rosa Disla  MRN: [id number]  Admission Date: 7/17/2025  Hospital Length of Stay: 3 days  Attending Provider: Ever Joe MD  Consulting Provider: Dorie Smith MD  Principal Problem:Neutropenic fever    Consults  Subjective:     HPI:  This is a 65-year-old female with a recent T2 N0 grade 3 triple negative breast carcinoma who was on treatment with   Her last therapy was on July 10th.  She did receive growth factors as well.    She was treated with Adriamycin Cytoxan on July 10th  Patient reports she slept for a couple of days.  Then she was getting up feeling weak and dizzy.  She fell.  And then she got significantly weak was brought into the emergency room  She denied any fever but was having chills and sweats.  She was found to be tachycardic hypotensive with blood pressures 89/40 and a heart rate of 120.  She would then have acute kidney injury    Was started on IV fluids vancomycin    We are called by the ER.  Because she had growth factors and did not recommend growth factors at this point.    Agree with broad-spectrum antibiotics and sepsis control.    The patient was seen and examined.  She has had 2 loose stools.  No blood in his stool, globally weak.  Rigors and chills, no more headaches or visual changes.  Dry mouth, no cough or shortness of breath.  Globally weak no focal weakness has chronic neuropathy has been on gabapentin in the past    No sick contacts at home.      07/18/2025:   Patient on the floor.    No chills no sweats   ate breakfast this morning.  Feels better.  No pain dry mouth improved.  Starting to urinate.  Positive flatus no bowel movement.    Still with temp 100.3°.  Systolic blood pressure 100/63, O2 sat 94%     7/19/2025:  Patient is seen and examined this morning.  She is feeling better and voices no concerns. Leukopenia improving with WBC 2.58 today. Platelets 54,000.   No fever, chills, sweats.  No chest pain or short of breath.    Today, 7/20/2025:  Examined in rounds.  She is afebrile.  Leukopenia resolved, WBCs 10.09 this morning.  Anemia stable with a hemoglobin of 8.9, thrombocytopenia continues to improve with platelet counts today of 96,000. She feels much better and is asking when she can go home.    Oncology Treatment Plan:   OP BREAST PEMBROLIZUMAB CARBOPLATIN (AUC 5) WITH WEEKLY PACLITAXEL FOLLOWED BY PEMBROLIZUMAB DOXORUBICIN CYCLOPHOSPHAMIDE FOLLOWED BY PEMBROLIZUMAB 200 MG Q3W    Medications:  Continuous Infusions:    Scheduled Meds:   ceFEPime IV (PEDS and ADULTS)  2 g Intravenous Q8H    DULoxetine  60 mg Oral Daily    gabapentin  100 mg Oral TID    mupirocin   Nasal BID    OLANZapine  5 mg Oral Daily    pantoprazole  20 mg Oral Daily    vancomycin (VANCOCIN) 1,000 mg in D5W 250 mL IVPB (admixture device)  1,000 mg Intravenous Q12H     PRN Meds:  Current Facility-Administered Medications:     0.9%  NaCl infusion (for blood administration), , Intravenous, Q24H PRN    acetaminophen, 1,000 mg, Oral, Q6H PRN    acetaminophen, 650 mg, Oral, Q4H PRN    dextrose 50%, 12.5 g, Intravenous, PRN    dextrose 50%, 25 g, Intravenous, PRN    glucagon (human recombinant), 1 mg, Intramuscular, PRN    glucose, 16 g, Oral, PRN    glucose, 24 g, Oral, PRN    naloxone, 0.02 mg, Intravenous, PRN    ondansetron, 4 mg, Intravenous, Q4H PRN    prochlorperazine, 5 mg, Intravenous, Q6H PRN    sodium chloride 0.9%, 10 mL, Intravenous, PRN    vancomycin - pharmacy to dose, , Intravenous, pharmacy to manage frequency     Review of patient's allergies indicates:  No Known Allergies     Past Medical History:   Diagnosis Date    Breast cancer     left    Hyperlipidemia     Menopause 4/27/20020     Past Surgical History:   Procedure Laterality Date    ADENOIDECTOMY      CHOLECYSTECTOMY  1998    COLONOSCOPY      with biopsy    INSERTION OF TUNNELED CENTRAL VENOUS CATHETER (CVC) WITH SUBCUTANEOUS PORT  N/A 3/24/2025    Procedure: [id number];  Surgeon: Vik Lynch Jr., MD;  Location: [facility];  Service: General;  Laterality: N/A;  Likely right    OCCLUSION, FALLOPIAN TUBE, USING DEVICE, BY VAGINAL OR SUPRAPUBIC APPROACH Bilateral     TONSILLECTOMY  1975     Family History       Problem Relation (Age of Onset)    Alzheimer's disease Mother    Arthritis Mother, Brother    Asbestos Paternal Uncle    Diabetes Mother, Sister, Brother, Brother, Maternal Grandmother    Epilepsy Brother    Heart disease Mother    Heart failure Father, Maternal Grandmother    Lung cancer Father    Osteoarthritis Maternal Aunt    Ovarian cancer Sister    Rashes / Skin problems Sister, Brother    Seizures Brother    Stroke Mother, Brother          Tobacco Use    Smoking status: Never     Passive exposure: Never    Smokeless tobacco: Never   Substance and Sexual Activity    Alcohol use: Not Currently     Comment: I ONLY WINE MAYBE TWICE year    Drug use: Never    Sexual activity: Yes     Partners: Male       Review of Systems pertinent positives above in interim history and daily note  Objective:     Vital Signs (Most Recent):  Temp: 98.1 °F (36.7 °C) (07/20/25 0829)  Pulse: (!) 115 (07/20/25 0829)  Resp: (!) 25 (07/20/25 0829)  BP: 122/67 (07/20/25 0829)  SpO2: (!) 93 % (07/20/25 0829) Vital Signs (24h Range):  Temp:  [98.1 °F (36.7 °C)-98.6 °F (37 °C)] 98.1 °F (36.7 °C)  Pulse:  [104-115] 115  Resp:  [25] 25  SpO2:  [93 %-96 %] 93 %  BP: (117-148)/(66-82) 122/67     Weight: 98.9 kg (218 lb 0.6 oz)  Body mass index is 39.87 kg/m².  Body surface area is 2.08 meters squared.    No intake or output data in the 24 hours ending 07/20/25 0934      Physical Exam  Vitals reviewed.   Constitutional:       Appearance: She is not ill-appearing.   HENT:      Head: Normocephalic.      Nose: Nose normal.      Mouth/Throat:      Mouth: Mucous membranes are dry.      Pharynx: Oropharynx is clear.   Eyes:      Extraocular Movements:  "Extraocular movements intact.      Pupils: Pupils are equal, round, and reactive to light.   Cardiovascular:      Rate and Rhythm: Normal rate and regular rhythm.      Pulses: Normal pulses.   Pulmonary:      Effort: Pulmonary effort is normal.   Chest:      Comments: Subclavian MediPort in place, no tenderness or redness    Abdominal:      General: Abdomen is flat. Bowel sounds are normal.   Musculoskeletal:      Cervical back: Neck supple.      Right lower leg: No edema.      Left lower leg: No edema.   Skin:     General: Skin is dry.      Comments: Patient has left lower extremity wound from tea-colored burn on the left lateral thigh.   Neurological:      General: No focal deficit present.      Mental Status: She is alert and oriented to person, place, and time.      Cranial Nerves: No cranial nerve deficit.   Psychiatric:         Mood and Affect: Mood normal.         Behavior: Behavior normal.         Thought Content: Thought content normal.         Judgment: Judgment normal.         Significant Labs:   CBC:   Recent Labs   Lab 07/19/25  0753 07/20/25  0652   WBC 2.58  2.58* 10.09  10.09   HGB 8.7* 8.9*   HCT 25.9* 26.5*   PLT 54* 96*   , CMP:   Recent Labs   Lab 07/19/25  0753 07/20/25  0652   * 139   K 3.5 3.2*    103   CO2 23 22*    98   BUN 13.1 10.4   CREATININE 0.69 0.66   CALCIUM 8.3* 8.8   , Coagulation: No results for input(s): "PT", "INR", "APTT" in the last 48 hours., LDH: No results for input(s): "LDHCSF", "BFSOURCE" in the last 48 hours., Reticulocytes: No results for input(s): "RETIC" in the last 48 hours., and Uric Acid No results for input(s): "URICACID" in the last 48 hours.    Diagnostic Results:  Pending    Assessment/Plan:   Pancytopenia secondary to chemotherapy   Sepsis        Lactic acidosis   ---resolved          Hypotension--resolving          Tachycardia---still present          Rigors---          Acute kidney injury---resolved  Neutropenic fever---hospital day 4, " vancomycin cefepime day 4  Breast carcinoma      IDC left breast, biopsy 01/27/2025, triple negative, overall grade 3; cT2 cN0 MX, AJCC anatomic stage at least IIA, clinical prognostic stage at least IIB   Her skin 1 was present on admission.  She states he has been present for several weeks.  She spilled T hot tea on her leg in her ankle    Chronic medical problems: Hyperlipidemia, postmenopausal, sensorineural hearing loss, psoriatic arthritis, JENNIFER, tinnitus, neuropathy, skin wound    -- American Society of Clinical Oncology and the Infectious Diseases Society of [place] recommend: antibiotics may be discontinued when the patient is clinically stable, afebrile for at least 24 hours, and neutrophil counts have recovered, provided there is no evidence of ongoing infection or new clinical instability       Recommendations  Today:  WBCs 10.09 with a ANC of 7,870.  Thrombocytopenia improving, platelets 96,000, anemia stable with HGB 8.9.    Remains afebrile > 24 hrs -- all cultures negative    Neutropenic resolved-okay to DC neutropenic precautions  Keep Hgb > 7 unless more symptomatic than greater than 8 - no indication at this time  Day 4 broad-spectrum antibiotics vancomycin, cefepime  Daily labs for several days, then q.48h   SCDs,   Okay with Lovenox if platelets> 50 and no clinical evidence of bleeding      Dorie Smith MD  Hematology/Oncology  [facility] - Oncology [facility]

## 2025-07-20 NOTE — PLAN OF CARE
Problem: Adult Inpatient Plan of Care  Goal: Absence of Hospital-Acquired Illness or Injury  Outcome: Progressing  Goal: Optimal Comfort and Wellbeing  Outcome: Progressing     Problem: Wound  Goal: Absence of Infection Signs and Symptoms  Outcome: Progressing  Goal: Optimal Pain Control and Function  Outcome: Progressing

## 2025-07-20 NOTE — DISCHARGE SUMMARY
[facility] Medicine Discharge Summary    Admit Date: 7/17/2025  Discharge Date and Time: 7/20/202512:37 PM  Admitting Physician:  Team  Discharging Physician: Ever Joe MD.  Primary Care Physician: Sweta Lanza FNP  Consults: Hematology/Oncology    Discharge Diagnoses:    Concern for Sepsis 2/2 below  Neutropenic fever, unclear source  - G-CSF on July 11th   Pancytopenia secondary to chemotherapy   Breast carcinoma      IDC left breast, biopsy 01/27/2025, triple negative, overall grade 3; cT2 cN0 MX, AJCC anatomic stage at least IIA, clinical prognostic stage at least IIB   Acute kidney injury, resolved  HypoMg, resolved     Hx of Hyperlipidemia, postmenopausal, sensorineural hearing loss, psoriatic arthritis, tinnitus, neuropathy    Hospital Course:   Alma Rosa Disla is a 65 y.o. female who  has a past medical history of recent T2 N0 grade 3 triple negative breast carcinoma who was on treatment with (last therapy was on July 10th.  did receive growth factors; was treated with Adriamycin Cytoxan on July 10th), Hyperlipidemia. The patient presented to [facility] on 7/17/2025 with a primary complaint of weakness.      Has had worsening feeling weakness and dizziness.  She fell prior to presentation and then she got significantly weak was brought into the emergency room. She denied any fever but was having chills and sweats.  She has had 2 loose stools.  No blood in his stool, globally weak, no more headaches or visual changes, no cough or shortness of breath.  Globally weak no focal weakness has chronic neuropathy has been on gabapentin in the past. No sick contacts at home. She was found to be tachycardic hypotensive. Was started on IV fluids vancomycin and cefepime  Admitted to  service. Oncology on consult. UA neg, CXR No acute cardiopulmonary process identified. 2 unit PRBC 7/17/25. 7/18 Tmax 100.3. Continued to improve while in the hospital and neutropenia  resolved. Now fever free >24 hrs. Oncology cleared for discharge.     Patient being discharged with medications and follow up instructions as below.    Discussed regarding ER precautions and when to return to ER if needed.  Labs in 1 week following discharge with primary care physician.    Pt was seen and examined on the day of discharge  Vitals:  VITAL SIGNS: 24 HRS MIN & MAX LAST   Temp  Min: 98.1 °F (36.7 °C)  Max: 98.6 °F (37 °C) 98.1 °F (36.7 °C)   BP  Min: 117/82  Max: 148/81 122/67   Pulse  Min: 104  Max: 115  (!) 115   Resp  Min: 25  Max: 25 (!) 25   SpO2  Min: 93 %  Max: 95 % (!) 93 %       Physical Exam:  General: In no acute distress, afebrile  Chest: Clear to auscultation bilaterally  Subclavian MediPort in place, no tenderness or redness   Heart: RRR, +S1, S2, no appreciable murmur  Abdomen: Soft, nontender, BS +  MSK: Warm, no lower extremity edema, no clubbing or cyanosis  Neurologic: Alert and oriented, moving all extremities with good strength    Procedures Performed: No admission procedures for hospital encounter.     Significant Diagnostic Studies: See Full reports for all details    Recent Labs   Lab 07/18/25  0532 07/19/25  0753 07/20/25  0652   WBC 0.54  0.54* 2.58  2.58* 10.09  10.09   RBC 2.91* 2.84* 2.91*   HGB 8.7* 8.7* 8.9*   HCT 26.5* 25.9* 26.5*   MCV 91.1 91.2 91.1   MCH 29.9 30.6 30.6   MCHC 32.8* 33.6 33.6   RDW 18.2* 18.6* 18.9*   PLT 44* 54* 96*   MPV 11.8* 12.5* 12.4*       Recent Labs   Lab 07/17/25  0933 07/18/25  0532 07/19/25  0753 07/20/25  0652    136 134* 139   K 4.2 3.2* 3.5 3.2*    104 105 103   CO2 24 24 23 22*   BUN 22.7* 15.8 13.1 10.4   CREATININE 1.23* 0.79 0.69 0.66   * 96 102 98   CALCIUM 8.8 8.5 8.3* 8.8   MG 1.40* 2.20  --   --    ALBUMIN 2.7* 2.3*  --   --    PROT 6.4 5.9  --   --    ALKPHOS 131 118  --   --    ALT 18 16  --   --    AST 12 10*  --   --    BILITOT 0.8 0.6  --   --         Microbiology Results (last 7 days)       Procedure  Component Value Units Date/Time    Blood Culture [[id number]]  (Normal) Collected: 07/17/25 1807    Order Status: Completed Specimen: Blood Updated: 07/19/25 2001     Blood Culture No Growth At 48 Hours    Blood Culture [[id number]]  (Normal) Collected: 07/17/25 1815    Order Status: Completed Specimen: Blood Updated: 07/19/25 2001     Blood Culture No Growth At 48 Hours    Stool Culture [[id number]]     Order Status: Sent Specimen: Stool     Clostridium Diff Toxin, A & B, EIA [[id number]]     Order Status: Canceled Specimen: Stool              X-Ray Chest PA And Lateral  Narrative: EXAMINATION:  XR CHEST PA AND LATERAL    CLINICAL HISTORY:  Sepsis;    TECHNIQUE:  Two-view    COMPARISON:  March 24, 2025.    FINDINGS:  Cardiopericardial silhouette is within normal limits.  Right chest implanted tunnel tamy catheter terminates within the superior vena cava.  No acute dense focal or segmental consolidation, congestion, pleural effusion or pneumothorax.  Impression: No acute cardiopulmonary process identified.    Electronically signed by: Christo Arteaga  Date:    07/17/2025  Time:    14:22         Medication List        START taking these medications      amoxicillin-clavulanate 875-125mg 875-125 mg per tablet  Commonly known as: AUGMENTIN  Take 1 tablet by mouth 2 (two) times daily. for 4 days     ciprofloxacin HCl 500 MG tablet  Commonly known as: CIPRO  Take 1.5 tablets (750 mg total) by mouth every 12 (twelve) hours. for 4 days            CONTINUE taking these medications      dexAMETHasone 4 MG Tab  Commonly known as: DECADRON  TAKE 2 TABLETS BY MOUTH ONCE DAILY ON DAYS 2, 3, AND 4 FOLLOWING CHEMOTHERAPY     DULoxetine 30 MG capsule  Commonly known as: CYMBALTA  Take 1 capsule (30 mg total) by mouth once daily for 7 days, THEN 2 capsules (60 mg total) once daily for 23 days.  Start taking on: May 29, 2025     gabapentin 100 MG capsule  Commonly known as: NEURONTIN  Take 1 capsule (100 mg total) by mouth 3 (three)  times daily.     magnesium oxide 400 mg (241.3 mg magnesium) tablet  Commonly known as: MAG-OX  Take 1 tablet (400 mg total) by mouth 2 (two) times daily. for 14 days     multivitamin per tablet  Commonly known as: THERAGRAN     OLANZapine 5 MG tablet  Commonly known as: ZyPREXA     omega-3 acid ethyl esters 1 gram capsule  Commonly known as: LOVAZA     pantoprazole 20 MG tablet  Commonly known as: PROTONIX  Take 1 tablet (20 mg total) by mouth once daily.     triamcinolone acetonide 0.1% 0.1 % cream  Commonly known as: KENALOG  Apply topically 3 (three) times daily. for 10 days     vitamin D 1000 units Tab  Commonly known as: VITAMIN D3               Where to Get Your Medications        These medications were sent to [id number] - [place] [address] [facility]  [address] [place] [address]      Phone: [phone]   amoxicillin-clavulanate 875-125mg 875-125 mg per tablet  ciprofloxacin HCl 500 MG tablet          Explained in detail to the patient about the discharge plan, medications, and follow-up visits. Pt understands and agrees with the treatment plan  Discharge Disposition: Home or Self Care   Discharged Condition: stable  Diet-   Dietary Orders (From admission, onward)       Start     Ordered    07/17/25 1355  Diet Adult Regular Isolation Tray - Styrofoam  Diet effective now        Question:  Tray type:  Answer:  Isolation Tray - Styrofoam    07/17/25 1354                   Medications Per DC med rec  Activities as tolerated   Follow-up Information       Sweta Lanza, KETANP Follow up in 1 week(s).    Specialty: Internal Medicine  Why: with CBC and CMP labs  Contact information:  [address] [address] [phone]               Oncology Follow up.    Why: as scheduled                         For further questions contact hospitalist office    Discharge time 33 minutes    For worsening symptoms,  chest pain, shortness of breath, increased abdominal pain, high grade fever, stroke or stroke like symptoms, immediately go to the nearest Emergency Room or call 911 as soon as possible.      Ever Mayer M.D, on 7/20/2025. at 12:37 PM.

## 2025-07-21 ENCOUNTER — PATIENT OUTREACH (OUTPATIENT)
Dept: ADMINISTRATIVE | Facility: CLINIC | Age: 65
End: 2025-07-21
Payer: MEDICARE

## 2025-07-21 NOTE — PROGRESS NOTES
TCC call not required; pt not applicable. Patient is currently receiving chemotherapy treatments.

## 2025-07-22 LAB
BACTERIA BLD CULT: NORMAL
BACTERIA BLD CULT: NORMAL

## 2025-07-24 ENCOUNTER — LAB VISIT (OUTPATIENT)
Dept: HEMATOLOGY/ONCOLOGY | Facility: CLINIC | Age: 65
End: 2025-07-24
Payer: MEDICARE

## 2025-07-24 DIAGNOSIS — E83.42 HYPOMAGNESEMIA: ICD-10-CM

## 2025-07-24 DIAGNOSIS — E87.6 HYPOKALEMIA: ICD-10-CM

## 2025-07-24 DIAGNOSIS — C50.919 TRIPLE NEGATIVE BREAST CANCER: ICD-10-CM

## 2025-07-24 DIAGNOSIS — E87.6 HYPOKALEMIA: Primary | ICD-10-CM

## 2025-07-24 DIAGNOSIS — Z17.421 TRIPLE NEGATIVE BREAST CANCER: ICD-10-CM

## 2025-07-24 DIAGNOSIS — C50.912 INVASIVE DUCTAL CARCINOMA OF LEFT BREAST: ICD-10-CM

## 2025-07-24 DIAGNOSIS — C50.912 INVASIVE DUCTAL CARCINOMA OF BREAST, FEMALE, LEFT: ICD-10-CM

## 2025-07-24 LAB
ABS NEUT CALC (OHS): 7.99 X10(3)/MCL (ref 2.1–9.2)
ALBUMIN SERPL-MCNC: 2.6 G/DL (ref 3.4–4.8)
ALBUMIN/GLOB SERPL: 0.7 RATIO (ref 1.1–2)
ALP SERPL-CCNC: 167 UNIT/L (ref 40–150)
ALT SERPL-CCNC: 22 UNIT/L (ref 0–55)
ANION GAP SERPL CALC-SCNC: 9 MEQ/L
ANISOCYTOSIS BLD QL SMEAR: ABNORMAL
AST SERPL-CCNC: 19 UNIT/L (ref 11–45)
BILIRUB SERPL-MCNC: 0.3 MG/DL
BUN SERPL-MCNC: 9.6 MG/DL (ref 9.8–20.1)
CALCIUM SERPL-MCNC: 9 MG/DL (ref 8.4–10.2)
CHLORIDE SERPL-SCNC: 104 MMOL/L (ref 98–107)
CO2 SERPL-SCNC: 30 MMOL/L (ref 23–31)
CREAT SERPL-MCNC: 0.93 MG/DL (ref 0.55–1.02)
CREAT/UREA NIT SERPL: 10
EOSINOPHIL NFR BLD MANUAL: 0.11 X10(3)/MCL (ref 0–0.9)
EOSINOPHIL NFR BLD MANUAL: 1 % (ref 0–8)
ERYTHROCYTE [DISTWIDTH] IN BLOOD BY AUTOMATED COUNT: 19.2 % (ref 11.5–17)
GFR SERPLBLD CREATININE-BSD FMLA CKD-EPI: >60 ML/MIN/1.73/M2
GLOBULIN SER-MCNC: 3.8 GM/DL (ref 2.4–3.5)
GLUCOSE SERPL-MCNC: 136 MG/DL (ref 82–115)
HCT VFR BLD AUTO: 29.2 % (ref 37–47)
HGB BLD-MCNC: 9.4 G/DL (ref 12–16)
LYMPHOCYTES NFR BLD MANUAL: 1.44 X10(3)/MCL (ref 0.6–4.6)
LYMPHOCYTES NFR BLD MANUAL: 13 % (ref 13–40)
MACROCYTES BLD QL SMEAR: ABNORMAL
MAGNESIUM SERPL-MCNC: 1.4 MG/DL (ref 1.6–2.6)
MCH RBC QN AUTO: 30.5 PG (ref 27–31)
MCHC RBC AUTO-ENTMCNC: 32.2 G/DL (ref 33–36)
MCV RBC AUTO: 94.8 FL (ref 80–94)
METAMYELOCYTES NFR BLD MANUAL: 4 %
MONOCYTES NFR BLD MANUAL: 1 X10(3)/MCL (ref 0.1–1.3)
MONOCYTES NFR BLD MANUAL: 9 % (ref 2–11)
NEUTROPHILS NFR BLD MANUAL: 72 % (ref 47–80)
NRBC BLD AUTO-RTO: 2.2 %
NRBC BLD MANUAL-RTO: 1 %
PLATELET # BLD AUTO: 220 X10(3)/MCL (ref 130–400)
PLATELET # BLD EST: NORMAL 10*3/UL
PMV BLD AUTO: 11 FL (ref 7.4–10.4)
POLYCHROMASIA BLD QL SMEAR: SLIGHT
POTASSIUM SERPL-SCNC: 3.2 MMOL/L (ref 3.5–5.1)
PROMYELOCYTES # BLD MANUAL: 1 %
PROT SERPL-MCNC: 6.4 GM/DL (ref 5.8–7.6)
RBC # BLD AUTO: 3.08 X10(6)/MCL (ref 4.2–5.4)
RBC MORPH BLD: NORMAL
SODIUM SERPL-SCNC: 143 MMOL/L (ref 136–145)
WBC # BLD AUTO: 11.1 X10(3)/MCL (ref 4.5–11.5)

## 2025-07-24 PROCEDURE — 83735 ASSAY OF MAGNESIUM: CPT

## 2025-07-24 PROCEDURE — 85007 BL SMEAR W/DIFF WBC COUNT: CPT

## 2025-07-24 PROCEDURE — 80053 COMPREHEN METABOLIC PANEL: CPT

## 2025-07-24 RX ORDER — POTASSIUM CHLORIDE 20 MEQ/1
20 TABLET, EXTENDED RELEASE ORAL 2 TIMES DAILY
Qty: 14 TABLET | Refills: 0 | Status: SHIPPED | OUTPATIENT
Start: 2025-07-24 | End: 2025-07-31

## 2025-07-24 RX ORDER — LANOLIN ALCOHOL/MO/W.PET/CERES
400 CREAM (GRAM) TOPICAL 2 TIMES DAILY
Qty: 14 TABLET | Refills: 0 | Status: SHIPPED | OUTPATIENT
Start: 2025-07-24 | End: 2025-07-31

## 2025-07-24 RX ORDER — POTASSIUM CHLORIDE 20 MEQ/1
20 TABLET, EXTENDED RELEASE ORAL 2 TIMES DAILY
Qty: 14 TABLET | Refills: 0 | Status: SHIPPED | OUTPATIENT
Start: 2025-07-24 | End: 2025-07-24 | Stop reason: SDUPTHER

## 2025-07-24 RX ORDER — LANOLIN ALCOHOL/MO/W.PET/CERES
400 CREAM (GRAM) TOPICAL 2 TIMES DAILY
Qty: 14 TABLET | Refills: 0 | Status: SHIPPED | OUTPATIENT
Start: 2025-07-24 | End: 2025-07-24 | Stop reason: SDUPTHER

## 2025-07-24 NOTE — PROGRESS NOTES
Potassium 3.2  Start potassium chloride 20 meq BID x 7 days    Magnesium 1.4  Continue magnesium oxide 400 mg BID for 7 days

## 2025-07-29 ENCOUNTER — HOSPITAL ENCOUNTER (OUTPATIENT)
Dept: CARDIOLOGY | Facility: HOSPITAL | Age: 65
Discharge: HOME | End: 2025-07-29
Attending: INTERNAL MEDICINE
Payer: MEDICARE

## 2025-07-29 DIAGNOSIS — C50.919 TRIPLE NEGATIVE BREAST CANCER: ICD-10-CM

## 2025-07-29 DIAGNOSIS — Z51.11 CHEMOTHERAPY MANAGEMENT, ENCOUNTER FOR: ICD-10-CM

## 2025-07-29 DIAGNOSIS — C50.912 INVASIVE DUCTAL CARCINOMA OF LEFT BREAST: ICD-10-CM

## 2025-07-29 DIAGNOSIS — Z17.421 TRIPLE NEGATIVE BREAST CANCER: ICD-10-CM

## 2025-07-29 LAB
APICAL FOUR CHAMBER EJECTION FRACTION: 47 %
APICAL TWO CHAMBER EJECTION FRACTION: 36 %
AV INDEX (PROSTH): 0.69
AV MEAN GRADIENT: 3 MMHG
AV PEAK GRADIENT: 6 MMHG
AV VALVE AREA BY VELOCITY RATIO: 2.1 CM²
AV VALVE AREA: 2.2 CM²
AV VELOCITY RATIO: 0.67
CV ECHO LV RWT: 0.65 CM
DOP CALC AO PEAK VEL: 1.2 M/S
DOP CALC AO VTI: 15.6 CM
DOP CALC LVOT AREA: 3.1 CM2
DOP CALC LVOT DIAMETER: 2 CM
DOP CALC LVOT PEAK VEL: 0.8 M/S
DOP CALC MV VTI: 10.9 CM
DOP CALCLVOT PEAK VEL VTI: 10.7 CM
E WAVE DECELERATION TIME: 99 MSEC
E/A RATIO: 0.59
E/E' RATIO: 6 M/S
ECHO LV POSTERIOR WALL: 1.1 CM (ref 0.6–1.1)
FRACTIONAL SHORTENING: 26.5 % (ref 28–44)
GLOBAL LONGITUIDAL STRAIN: 11 %
HR MV ECHO: 119 BPM
INTERVENTRICULAR SEPTUM: 1.1 CM (ref 0.6–1.1)
LEFT ATRIUM AREA SYSTOLIC (APICAL 2 CHAMBER): 13.2 CM2
LEFT ATRIUM AREA SYSTOLIC (APICAL 4 CHAMBER): 12.1 CM2
LEFT ATRIUM SIZE: 2.7 CM
LEFT INTERNAL DIMENSION IN SYSTOLE: 2.5 CM (ref 2.1–4)
LEFT VENTRICLE DIASTOLIC VOLUME: 47 ML
LEFT VENTRICLE END DIASTOLIC VOLUME APICAL 2 CHAMBER: 35.6 ML
LEFT VENTRICLE END DIASTOLIC VOLUME APICAL 4 CHAMBER: 39.6 ML
LEFT VENTRICLE END SYSTOLIC VOLUME APICAL 2 CHAMBER: 34.4 ML
LEFT VENTRICLE END SYSTOLIC VOLUME APICAL 4 CHAMBER: 21.8 ML
LEFT VENTRICLE SYSTOLIC VOLUME: 22 ML
LEFT VENTRICULAR INTERNAL DIMENSION IN DIASTOLE: 3.4 CM (ref 3.5–6)
LEFT VENTRICULAR MASS: 114 G
LV LATERAL E/E' RATIO: 5.4 M/S
LV SEPTAL E/E' RATIO: 6.1 M/S
LVED V (TEICH): 47.4 ML
LVES V (TEICH): 22.3 ML
LVOT MG: 1 MMHG
LVOT MV: 0.49 CM/S
MV MEAN GRADIENT: 3 MMHG
MV PEAK A VEL: 0.83 M/S
MV PEAK E VEL: 0.49 M/S
MV PEAK GRADIENT: 5 MMHG
MV VALVE AREA BY CONTINUITY EQUATION: 3.08 CM2
OHS LV EJECTION FRACTION SIMPSONS BIPLANE MOD: 43 %
RA PRESSURE ESTIMATED: 3 MMHG
SINUS: 3 CM
TDI LATERAL: 0.09 M/S
TDI SEPTAL: 0.08 M/S
TDI: 0.09 M/S
TRICUSPID ANNULAR PLANE SYSTOLIC EXCURSION: 1.7 CM

## 2025-07-29 PROCEDURE — 93306 TTE W/DOPPLER COMPLETE: CPT | Mod: 26,,, | Performed by: INTERNAL MEDICINE

## 2025-07-29 PROCEDURE — 93356 MYOCRD STRAIN IMG SPCKL TRCK: CPT

## 2025-07-29 PROCEDURE — 93356 MYOCRD STRAIN IMG SPCKL TRCK: CPT | Mod: ,,, | Performed by: INTERNAL MEDICINE

## 2025-07-30 RX ORDER — HEPARIN 100 UNIT/ML
500 SYRINGE INTRAVENOUS
OUTPATIENT
Start: 2025-07-31

## 2025-07-30 RX ORDER — DOXORUBICIN HYDROCHLORIDE 2 MG/ML
60 INJECTION, SOLUTION INTRAVENOUS
OUTPATIENT
Start: 2025-07-31

## 2025-07-30 RX ORDER — SODIUM CHLORIDE 0.9 % (FLUSH) 0.9 %
10 SYRINGE (ML) INJECTION
OUTPATIENT
Start: 2025-07-31

## 2025-07-30 RX ORDER — DIPHENHYDRAMINE HYDROCHLORIDE 50 MG/ML
50 INJECTION, SOLUTION INTRAMUSCULAR; INTRAVENOUS ONCE AS NEEDED
OUTPATIENT
Start: 2025-07-31

## 2025-07-30 RX ORDER — EPINEPHRINE 0.3 MG/.3ML
0.3 INJECTION SUBCUTANEOUS ONCE AS NEEDED
OUTPATIENT
Start: 2025-07-31

## 2025-07-31 ENCOUNTER — LAB VISIT (OUTPATIENT)
Dept: HEMATOLOGY/ONCOLOGY | Facility: CLINIC | Age: 65
End: 2025-07-31
Attending: INTERNAL MEDICINE
Payer: MEDICARE

## 2025-07-31 ENCOUNTER — INFUSION (OUTPATIENT)
Dept: INFUSION THERAPY | Facility: HOSPITAL | Age: 65
End: 2025-07-31
Attending: INTERNAL MEDICINE
Payer: MEDICARE

## 2025-07-31 VITALS
DIASTOLIC BLOOD PRESSURE: 67 MMHG | BODY MASS INDEX: 38.98 KG/M2 | RESPIRATION RATE: 16 BRPM | WEIGHT: 211.81 LBS | TEMPERATURE: 97 F | HEART RATE: 124 BPM | SYSTOLIC BLOOD PRESSURE: 107 MMHG | HEIGHT: 62 IN | OXYGEN SATURATION: 97 %

## 2025-07-31 DIAGNOSIS — D84.821 IMMUNODEFICIENCY DUE TO CHEMOTHERAPY: ICD-10-CM

## 2025-07-31 DIAGNOSIS — C50.912 INVASIVE DUCTAL CARCINOMA OF LEFT BREAST: ICD-10-CM

## 2025-07-31 DIAGNOSIS — T45.1X5A IMMUNOSUPPRESSED DUE TO CHEMOTHERAPY: ICD-10-CM

## 2025-07-31 DIAGNOSIS — Z17.421 TRIPLE NEGATIVE BREAST CANCER: ICD-10-CM

## 2025-07-31 DIAGNOSIS — Z80.1 FAMILY HISTORY OF LUNG CANCER: ICD-10-CM

## 2025-07-31 DIAGNOSIS — Z79.69 IMMUNOSUPPRESSED DUE TO CHEMOTHERAPY: ICD-10-CM

## 2025-07-31 DIAGNOSIS — R76.8 POSITIVE ANA (ANTINUCLEAR ANTIBODY): Chronic | ICD-10-CM

## 2025-07-31 DIAGNOSIS — Z51.11 ENCOUNTER FOR CHEMOTHERAPY MANAGEMENT: ICD-10-CM

## 2025-07-31 DIAGNOSIS — R92.8 ABNORMAL MAMMOGRAM OF LEFT BREAST: ICD-10-CM

## 2025-07-31 DIAGNOSIS — Z29.89 IMMUNOTHERAPY ENCOUNTER: ICD-10-CM

## 2025-07-31 DIAGNOSIS — G62.0 CHEMOTHERAPY-INDUCED PERIPHERAL NEUROPATHY: Primary | ICD-10-CM

## 2025-07-31 DIAGNOSIS — T45.1X5A CHEMOTHERAPY-INDUCED PERIPHERAL NEUROPATHY: Primary | ICD-10-CM

## 2025-07-31 DIAGNOSIS — Z78.0 POSTMENOPAUSAL: ICD-10-CM

## 2025-07-31 DIAGNOSIS — T45.1X5A IMMUNODEFICIENCY DUE TO CHEMOTHERAPY: ICD-10-CM

## 2025-07-31 DIAGNOSIS — D64.81 ANEMIA DUE TO CHEMOTHERAPY: ICD-10-CM

## 2025-07-31 DIAGNOSIS — E46 PROTEIN-CALORIE MALNUTRITION, UNSPECIFIED SEVERITY: ICD-10-CM

## 2025-07-31 DIAGNOSIS — C50.912 INVASIVE DUCTAL CARCINOMA OF BREAST, FEMALE, LEFT: ICD-10-CM

## 2025-07-31 DIAGNOSIS — L27.0 DERMATITIS, DRUG-INDUCED: ICD-10-CM

## 2025-07-31 DIAGNOSIS — E03.8 SUBCLINICAL HYPOTHYROIDISM: ICD-10-CM

## 2025-07-31 DIAGNOSIS — Z51.11 CHEMOTHERAPY MANAGEMENT, ENCOUNTER FOR: ICD-10-CM

## 2025-07-31 DIAGNOSIS — C50.919 TRIPLE NEGATIVE BREAST CANCER: ICD-10-CM

## 2025-07-31 DIAGNOSIS — Z79.69 IMMUNODEFICIENCY DUE TO CHEMOTHERAPY: ICD-10-CM

## 2025-07-31 DIAGNOSIS — D84.821 IMMUNOSUPPRESSED DUE TO CHEMOTHERAPY: ICD-10-CM

## 2025-07-31 DIAGNOSIS — R53.83 OTHER FATIGUE: ICD-10-CM

## 2025-07-31 DIAGNOSIS — T45.1X5A ANEMIA DUE TO CHEMOTHERAPY: ICD-10-CM

## 2025-07-31 DIAGNOSIS — H93.13 TINNITUS OF BOTH EARS: ICD-10-CM

## 2025-07-31 DIAGNOSIS — H91.93 BILATERAL HEARING LOSS, UNSPECIFIED HEARING LOSS TYPE: ICD-10-CM

## 2025-07-31 DIAGNOSIS — T45.1X5S CHEMOTHERAPY ADVERSE REACTION, SEQUELA: ICD-10-CM

## 2025-07-31 DIAGNOSIS — Z17.421 TRIPLE NEGATIVE BREAST CANCER: Primary | ICD-10-CM

## 2025-07-31 DIAGNOSIS — C50.919 TRIPLE NEGATIVE BREAST CANCER: Primary | ICD-10-CM

## 2025-07-31 DIAGNOSIS — Z80.41 FAMILY HISTORY OF OVARIAN CANCER: ICD-10-CM

## 2025-07-31 LAB
ALBUMIN SERPL-MCNC: 3 G/DL (ref 3.4–4.8)
ALBUMIN/GLOB SERPL: 0.7 RATIO (ref 1.1–2)
ALP SERPL-CCNC: 140 UNIT/L (ref 40–150)
ALT SERPL-CCNC: 23 UNIT/L (ref 0–55)
ANION GAP SERPL CALC-SCNC: 9 MEQ/L
AST SERPL-CCNC: 26 UNIT/L (ref 11–45)
BASOPHILS # BLD AUTO: 0.12 X10(3)/MCL
BASOPHILS NFR BLD AUTO: 2.5 %
BILIRUB SERPL-MCNC: 0.3 MG/DL
BUN SERPL-MCNC: 10.7 MG/DL (ref 9.8–20.1)
CALCIUM SERPL-MCNC: 9.8 MG/DL (ref 8.4–10.2)
CHLORIDE SERPL-SCNC: 104 MMOL/L (ref 98–107)
CO2 SERPL-SCNC: 26 MMOL/L (ref 23–31)
CREAT SERPL-MCNC: 0.81 MG/DL (ref 0.55–1.02)
CREAT/UREA NIT SERPL: 13
EOSINOPHIL # BLD AUTO: 0.01 X10(3)/MCL (ref 0–0.9)
EOSINOPHIL NFR BLD AUTO: 0.2 %
ERYTHROCYTE [DISTWIDTH] IN BLOOD BY AUTOMATED COUNT: 18.1 % (ref 11.5–17)
FERRITIN SERPL-MCNC: 2056.77 NG/ML (ref 4.63–204)
GFR SERPLBLD CREATININE-BSD FMLA CKD-EPI: >60 ML/MIN/1.73/M2
GLOBULIN SER-MCNC: 4.5 GM/DL (ref 2.4–3.5)
GLUCOSE SERPL-MCNC: 99 MG/DL (ref 82–115)
HCT VFR BLD AUTO: 32.6 % (ref 37–47)
HGB BLD-MCNC: 10.3 G/DL (ref 12–16)
IMM GRANULOCYTES # BLD AUTO: 0.03 X10(3)/MCL (ref 0–0.04)
IMM GRANULOCYTES NFR BLD AUTO: 0.6 %
IRON SATN MFR SERPL: 32 % (ref 20–50)
IRON SERPL-MCNC: 74 UG/DL (ref 50–170)
LYMPHOCYTES # BLD AUTO: 1.39 X10(3)/MCL (ref 0.6–4.6)
LYMPHOCYTES NFR BLD AUTO: 29.3 %
MAGNESIUM SERPL-MCNC: 1.9 MG/DL (ref 1.6–2.6)
MCH RBC QN AUTO: 30.6 PG (ref 27–31)
MCHC RBC AUTO-ENTMCNC: 31.6 G/DL (ref 33–36)
MCV RBC AUTO: 96.7 FL (ref 80–94)
MONOCYTES # BLD AUTO: 0.63 X10(3)/MCL (ref 0.1–1.3)
MONOCYTES NFR BLD AUTO: 13.3 %
NEUTROPHILS # BLD AUTO: 2.57 X10(3)/MCL (ref 2.1–9.2)
NEUTROPHILS NFR BLD AUTO: 54.1 %
NRBC BLD AUTO-RTO: 0 %
PLATELET # BLD AUTO: 394 X10(3)/MCL (ref 130–400)
PMV BLD AUTO: 10.3 FL (ref 7.4–10.4)
POTASSIUM SERPL-SCNC: 4.4 MMOL/L (ref 3.5–5.1)
PROT SERPL-MCNC: 7.5 GM/DL (ref 5.8–7.6)
RBC # BLD AUTO: 3.37 X10(6)/MCL (ref 4.2–5.4)
RET# (OHS): 0.08 X10E6/UL (ref 0.02–0.08)
RETICULOCYTE COUNT AUTOMATED (OLG): 2.5 % (ref 1.1–2.1)
SODIUM SERPL-SCNC: 139 MMOL/L (ref 136–145)
T4 FREE SERPL-MCNC: 0.98 NG/DL (ref 0.7–1.48)
TIBC SERPL-MCNC: 160 UG/DL (ref 70–310)
TIBC SERPL-MCNC: 234 UG/DL (ref 250–450)
TRANSFERRIN SERPL-MCNC: 193 MG/DL (ref 173–360)
TSH SERPL-ACNC: 4.05 UIU/ML (ref 0.35–4.94)
WBC # BLD AUTO: 4.75 X10(3)/MCL (ref 4.5–11.5)

## 2025-07-31 PROCEDURE — 85025 COMPLETE CBC W/AUTO DIFF WBC: CPT

## 2025-07-31 PROCEDURE — 83735 ASSAY OF MAGNESIUM: CPT

## 2025-07-31 PROCEDURE — 84439 ASSAY OF FREE THYROXINE: CPT

## 2025-07-31 PROCEDURE — 99214 OFFICE O/P EST MOD 30 MIN: CPT | Mod: PBBFAC | Performed by: INTERNAL MEDICINE

## 2025-07-31 PROCEDURE — 80053 COMPREHEN METABOLIC PANEL: CPT

## 2025-07-31 PROCEDURE — 84443 ASSAY THYROID STIM HORMONE: CPT

## 2025-07-31 PROCEDURE — 99215 OFFICE O/P EST HI 40 MIN: CPT | Mod: S$PBB,,, | Performed by: INTERNAL MEDICINE

## 2025-07-31 NOTE — Clinical Note
Updated orders for today: Addendum: -follow-up with NP in 2 weeks to discuss the results of brain MRI -discontinue chemotherapy; patient is apprehensive to continue chemotherapy in view of recent hospitalization post cycle 5 of chemotherapy -at this time, refer back to surgery for lumpectomy/mastectomy -I will see her back for follow-up in 1 month, after breast surgery

## 2025-07-31 NOTE — PROGRESS NOTES
History:  Past Medical History:   Diagnosis Date    Breast cancer     left    Hyperlipidemia     Menopause 4/27/20020     Past Surgical History:   Procedure Laterality Date    ADENOIDECTOMY      CHOLECYSTECTOMY  1998    COLONOSCOPY      with biopsy    INSERTION OF TUNNELED CENTRAL VENOUS CATHETER (CVC) WITH SUBCUTANEOUS PORT N/A 3/24/2025    Procedure: [id number]-O-CATH;  Surgeon: Vik Lynch Jr., MD;  Location: [facility];  Service: General;  Laterality: N/A;  Likely right    OCCLUSION, FALLOPIAN TUBE, USING DEVICE, BY VAGINAL OR SUPRAPUBIC APPROACH Bilateral     TONSILLECTOMY  1975      Social History     Socioeconomic History    Marital status:    Tobacco Use    Smoking status: Never     Passive exposure: Never    Smokeless tobacco: Never   Substance and Sexual Activity    Alcohol use: Not Currently     Comment: I ONLY WINE MAYBE TWICE year    Drug use: Never    Sexual activity: Yes     Partners: Male     Social Drivers of Health     Financial Resource Strain: Low Risk  (7/18/2025)    Overall Financial Resource Strain (CARDIA)     Difficulty of Paying Living Expenses: Not very hard   Food Insecurity: No Food Insecurity (7/18/2025)    Hunger Vital Sign     Worried About Running Out of Food in the Last Year: Never true     Ran Out of Food in the Last Year: Never true   Transportation Needs: No Transportation Needs (7/18/2025)    PRAPARE - Transportation     Lack of Transportation (Medical): No     Lack of Transportation (Non-Medical): No   Physical Activity: Inactive (7/18/2025)    Exercise Vital Sign     Days of Exercise per Week: 0 days     Minutes of Exercise per Session: 0 min   Stress: No Stress Concern Present (7/18/2025)    [employer] [place] [employer] - Occupational Stress Questionnaire     Feeling of Stress : Not at all   Housing Stability: Low Risk  (7/18/2025)    Housing Stability Vital Sign     Unable to Pay for Housing in the Last Year: No     Homeless in the Last Year: No       Family History   Problem Relation Name Age of Onset    Heart disease Mother Zari Vinay     Diabetes Mother Zari Vinay     Alzheimer's disease Mother Zari Vinay     Stroke Mother Zari Vinay     Arthritis Mother Zari Vinay     Lung cancer Father LoboEarjacob     Heart failure Father LoboEarjacob     Ovarian cancer Sister      Diabetes Sister      Rashes / Skin problems Sister      Diabetes Brother Rakesh SLETTY     Epilepsy Brother Rakesh SLETTY     Diabetes Brother DOREEN FRAUSTO     Rashes / Skin problems Brother DOREEN FRAUSTO     Seizures Brother DOREEN FRAUSTO     Stroke Brother DOREEN FRAUSTO     Arthritis Brother Brando Frausto     Heart failure Maternal Grandmother Mary     Diabetes Maternal Grandmother Mary     Osteoarthritis Maternal Aunt Valentina     Asbestos Paternal Uncle        Reason for Follow-up:  -IDC left breast, biopsy 01/27/2025, triple negative, overall grade 3; cT2 cN0 MX, AJCC anatomic stage at least IIA, clinical prognostic stage at least IIB  -chemotherapy-induced peripheral neuropathy  -postmenopausal  -father experienced lung cancer; sister experienced ovarian cancer  -obesity    History of Present Illness:   invasive ductal carcinoma of lefty breast      Oncologic/Hematologic History:  Oncology History   Invasive ductal carcinoma of left breast   1/27/2025 Cancer Staged    Staging form: Breast, AJCC 8th Edition  - Clinical stage from 1/27/2025: Stage IIB (cT2, cN0, cM0, G3, ER-, MT-, HER2-)     2/22/2025 Initial Diagnosis    Invasive ductal carcinoma of left breast     4/17/2025 -  Chemotherapy    Treatment Summary   Plan Name: OP BREAST PEMBROLIZUMAB CARBOPLATIN (AUC 5) WITH WEEKLY PACLITAXEL FOLLOWED BY PEMBROLIZUMAB DOXORUBICIN CYCLOPHOSPHAMIDE FOLLOWED BY PEMBROLIZUMAB 200 MG Q3W  Treatment Goal: Control  Status: Active  Start Date: 4/17/2025  End Date: 3/19/2026 (Planned)  Provider: Maximo Fox MD  Chemotherapy: DOXOrubicin chemo injection 128 mg, 60 mg/m2 = 128 mg, Intravenous,  Clinic/HOD 1 time, 1 of 4 cycles  Administration: 128 mg (7/10/2025)  CARBOplatin (PARAPLATIN) 750 mg in 0.9% NaCl 285 mL chemo infusion, 750 mg (108.3 % of original dose 692.5 mg), Intravenous, Clinic/HOD 1 time, 4 of 4 cycles  Dose modification:   (original dose 692.5 mg, Cycle 1)  Administration: 750 mg (2025), 705 mg (2025), 715 mg (2025), 705 mg (2025)  cycloPHOSphamide 600 mg/m2 = 1,280 mg in 0.9% NaCl 291.4 mL chemo infusion, 600 mg/m2 = 1,280 mg, Intravenous, Clinic/HOD 1 time, 1 of 4 cycles  Administration: 1,280 mg (7/10/2025)  PACLitaxeL (TAXOL) 80 mg/m2 = 168 mg in 0.9% NaCl 250 mL chemo infusion, 80 mg/m2 = 168 mg, Intravenous, Clinic/HOD 1 time, 4 of 4 cycles  Administration: 168 mg (2025), 168 mg (2025), 168 mg (2025), 168 mg (2025), 168 mg (5/15/2025), 168 mg (2025), 168 mg (2025), 168 mg (2025), 168 mg (2025), 168 mg (2025), 168 mg (2025), 168 mg (7/3/2025)     Past medical history: Dyslipidemia; postmenopausal; sensorineural hearing loss; obesity; psoriatic arthritis; JENNIFER positive; bilateral tinnitus  Procedure/surgical history: Adenoidectomy; cholecystectomy; colonoscopy; tonsillectomy    -2025:  Says that she underwent colonoscopy with [facility] 3 years ago, and that it showed 1 polyp; apparently, the recommended repeat colonoscopy in 5 years  Social history:  .  Lives in [place].  Has 1 child.  A 41-year-old son  from complications of diabetes mellitus.  She cleans houses.  She smoked <1 ppd x2 years; quit 40 years ago.  No alcohol or illicit drug abuse.  Family history: Father experienced lung cancer at age 84 (was a smoker); sister experienced ovarian cancer at age 54; maternal aunt experienced unknown kind of cancer in her 60s  Health maintenance:   -2023:  Thin prep cervical Pap smear:  NILM, high-risk HPV negative  -2025:  Says that she underwent colonoscopy with [facility] 3 years ago, and that it showed 1 polyp; apparently, the recommended repeat colonoscopy in 5 years  Menstrual/Ob gyn history: Menarche at age 13; LMP at age 60; no menopausal symptoms except for irritability x3-4 years; never took hormone replacement therapy; last OCP use at age 36 (used OCPs for 3 years); for pregnancies; 2 live births; 2 miscarriages;  her 2nd child for 6 months; 1st child at age 22      64-year-old lady, referred from [facility], with triple negative invasive ductal carcinoma.  Please refer to assessment and plan section for details.    02/24/2025:    Pleasant, healthy-appearing lady who presents for initial medical oncology consultation, accompanied by .  In no acute discomfort.    Overall, feels well and healthy.  Great appetite.  Never noticed any breast lumps, breast pain, nipple discharge, skin or nipple changes.  Never noticed regional lymphadenopathy.    No anorexia, unintentional weight loss, weakness, fatigue, unusual headaches, focal neurological symptoms, chest pain, cough, dyspnea, abdominal pain, nausea, vomiting, GI bleeding, change in bowel habits, bone pains, any urinary problems, postmenopausal spotting, etc..  ECOG 0.    Interval History:  [No matching plan found]   OP BREAST PEMBROLIZUMAB CARBOPLATIN (AUC 5) WITH WEEKLY PACLITAXEL FOLLOWED BY PEMBROLIZUMAB DOXORUBICIN CYCLOPHOSPHAMIDE FOLLOWED BY PEMBROLIZUMAB 200 MG Q3W     07/31/2025:   -neoadjuvant chemotherapy # 5 on 07/10/2025 (Keytruda/Adriamycin/Cytoxan cycle 1)  -07/10/2025: Serum iron low, TIBC low, ferritin 1629 elevated, transferrin saturation 17% low, B12 level 338 normal, folate 11.0 normal, retic count 2.79%, haptoglobin 409 elevated;  elevated  -07/11/2025: Restaging bilateral breast MRI with and without contrast (comparison 03/12/2025 breast MRI; S/P neoadjuvant chemotherapy x4 cycles):  Left breast mass 1.3 x 1.5 x 1.4 cm (significantly decreased in size and  degree of enhancement, consistent with partial response to neoadjuvant chemotherapy) (tumor was 2.4 x 2.2 x 2.9 cm cm on MRI 03/13/2025)  -hospitalized 07/17/2025-07/20/2025 ([facility]):  Generalized weakness; fell prior to presentation; denied fever but had experienced chills and sweats; concern for sepsis secondary to neutropenia; neutropenic fever; pancytopenia secondary to chemotherapy; ANGELA, resolved; hypomagnesemia, resolved  -hemoglobin: 9.4 on 07/24/2025, 8.7 on 07/19/2025, 6.6 on 07/17/2025, 9.0 on 07/10/2025, etc. (MCV normal/slightly elevated)  -ANC dropped to 0.0105 on 07/17/2025 (7 days post Adriamycin/Cytoxan/Adriamycin cycle 1)  -07/29/2025:  TTE: LVEF 45-50%, diastolic dysfunction (LVEF was 55-60% on TTE 02/24/2025)  -07/31/2025: WBC and differential normal, hemoglobin 10.3 stable, platelets 394 K normal, CMP reviewed; TSH and free T4 normal  Presents for a follow-up visit.  Accompanied by her .  In no acute discomfort.  She is apprehensive to continue chemotherapy after recent hospitalization with neutropenic fever/sepsis.  I think this is reasonable.  She has residual weakness, fatigue, chills, dyspnea, heartburn, nausea, numbness, tingling, and no appetite.  The symptoms are mild.  Actually, she has recovered very well and endorses as such.  No unusual headaches, focal neurological symptoms, or any new lumps or lymphadenopathy.  Keytruda induced erythematous rash over both arms which is immune dermatitis, has resolved.   No other immune related adverse events with Keytruda in the form of immune pneumonitis, myocarditis, hepatitis, nephritis, colitis, endocrinopathies, ophthalmitis, cerebritis, etc..    ECOG 0-1.      Immunization History   Administered Date(s) Administered    COVID-19, MRNA, LN-S, PF (Pfizer) (Purple Cap) 07/17/2021, 08/07/2021    Influenza (FLUBLOK) - Quadrivalent - Recombinant - PF *Preferred* (egg allergy) 11/29/2020    Influenza - Quadrivalent - MDCK - PF 01/03/2022,  11/30/2023    Influenza - Quadrivalent - PF *Preferred* (6 months and older) 11/04/2017, 09/23/2022    Influenza - Trivalent - Afluria, Fluzone MDV 11/07/2011    Influenza - Trivalent - Fluarix, Flulaval, Fluzone, Afluria - PF 11/07/2011, 12/15/2014, 11/16/2016, 11/04/2017, 11/29/2020, 01/03/2022    Pneumococcal Conjugate - 20 Valent 02/24/2025    Tdap 08/24/2023    Zoster Recombinant 06/17/2022, 09/23/2022     Allergies as of 07/31/2025    (No Known Allergies)     Medications:  Medications Ordered Prior to Encounter[1]    Review of Systems:   All systems reviewed and found to be negative except for the symptoms detailed above    Physical Examination:   VITAL SIGNS:   Vitals:    07/31/25 0846   BP: 107/67   Pulse: (!) 124   Resp: 16   Temp: 97.4 °F (36.3 °C)     GENERAL:  In no apparent distress.    HEAD:  No signs of head trauma.  EYES:  Pupils are equal.  Extraocular motions intact.    EARS:  Hearing grossly intact.  MOUTH:  Oropharynx is normal.   NECK:  No adenopathy, no JVD.     CHEST:  Chest with clear breath sounds bilaterally.  No wheezes, rales, rhonchi.    CARDIAC:  Regular rate and rhythm.  S1 and S2, without murmurs, gallops, rubs.  VASCULAR:  No Edema.  Peripheral pulses normal and equal in all extremities.  ABDOMEN:  Soft, without detectable tenderness.  No sign of distention.  No   rebound or guarding, and no masses palpated.   Bowel Sounds normal.  MUSCULOSKELETAL:  Good range of motion of all major joints. Extremities without clubbing, cyanosis or edema.    NEUROLOGIC EXAM:  Alert and oriented x 3.  No focal sensory or strength deficits.   Speech normal.  Follows commands.  PSYCHIATRIC:  Mood normal.    Assessment:  Problem List Items Addressed This Visit       Positive JENNIFER (antinuclear antibody) (Chronic)    Abnormal mammogram of left breast    Tinnitus of both ears    Bilateral hearing loss    Triple negative breast cancer    Invasive ductal carcinoma of left breast    Postmenopausal    Family  history of ovarian cancer    Family history of lung cancer    Subclinical hypothyroidism    Immunodeficiency due to chemotherapy    Chemotherapy-induced peripheral neuropathy - Primary    Encounter for chemotherapy management    Immunotherapy encounter    Dermatitis, drug-induced    Anemia due to chemotherapy    Chemotherapy management, encounter for    Immunosuppressed due to chemotherapy     Other Visit Diagnoses         Chemotherapy adverse reaction, sequela                Orders for 07/31/2025:   Genetic testing   -follow-up with NP in 2 weeks to discuss the results of brain MRI  -discontinue chemotherapy; patient is apprehensive to continue chemotherapy in view of recent hospitalization post cycle 5 of chemotherapy  -at this time, refer back to surgery for lumpectomy/mastectomy  -I will see her back for follow-up in 1 month, after breast surgery  Brain MRI with contrast is pending  Check retic count, serum iron, TIBC, ferritin now  Continue Cymbalta 60 mg p.o. q.h.s. for neuropathy  Follow-up with NP in 2 weeks with brain MRI   Follow-up with me in 1 month, after breast surgery    Above discussed with the patient.  All questions answered.    Discussed labs and scans and gave her copies of relevant results.  She understands and agrees with this plan.  ====================================    #IDC left breast, triple negative:  -screening mammogram 12/13/2023: BI-RADS 0  -diagnostic mammogram and ultrasound 01/27/2025:  BI-RADS: 5  -core biopsy 01/27/2025:  IDC, overall grade 3, triple negative  -ER negative (0%), WV negative (0%), HER2 negative (score 0), Ki-67 high proliferation (65%)  -radiologically, per mammogram and ultrasound 01/27/2025:  23 x 17 x 22 mm mass; no lymphadenopathy  -02/24/2025: Initial consultation: ECOG 0  -pre chemotherapy TTE 03/05/2025:  LVEF 55-60%  -staging CTs C/A/P and bone scan 03/06/2025: No distant metastases  -breast MRI 03/13/2025:  Left breast lesion 2.4 x 2.2 x 2.9 cm  -left IJ  MediPort placed 03/24/2025  -left breast tumor 23 mm per mammogram and ultrasound, 2.9 cm per MRI, triple negative; G3  -cT2 cN0 MX, AJCC anatomic stage at least IIA, clinical prognostic stage at least IIB  -S/P neoadjuvant carboplatin/Taxol/Keytruda every 3 weeks x4 cycles:  04/17/2025-07/03/2025)  -Keytruda held C4 D1 due to dermatologic toxicity  -for peripheral neuropathy, Cymbalta started 05/29/2025  -07/08/2025: Restaging breast MRI with and without contrast: Report pending  -no severe cytopenias with chemotherapy except for progressive drop in hemoglobin:  13.8 on 04/17/2025, down to 8.9 on 07/03/2025 (secondary to myelosuppression with chemotherapy)  -neoadjuvant chemotherapy # 5 on 07/10/2025 (Keytruda/Adriamycin/Cytoxan cycle 1)  -restaging breast MRI 07/11/2025: S/p neoadjuvant x4 cycles:  Significant response (left breast mass 1.3 x 1.5 x 1.4 cm, previously 2.4 x 2.2 x 2.9 cm before starting neoadjuvant chemotherapy)  =========================================  -hospitalized 07/17/2025-07/20/2025 ([facility]):  Generalized weakness; fell prior to presentation; denied fever but had experienced chills and sweats; concern for sepsis secondary to neutropenia; neutropenic fever; pancytopenia secondary to chemotherapy; ANGELA, resolved; hypomagnesemia, resolved  -hemoglobin: 9.4 on 07/24/2025, 8.7 on 07/19/2025, 6.6 on 07/17/2025, 9.0 on 07/10/2025, etc. (MCV normal/slightly elevated)  -ANC dropped to 0.0105 on 07/17/2025 (7 days post Adriamycin/Cytoxan/Adriamycin cycle 1)  ==========================================  -07/29/2025:  TTE: LVEF 45-50%, diastolic dysfunction (LVEF was 55-60% on TTE 02/24/2025)  >>>  Plan:  Postmenopausal  Triple negative breast cancers; ordered genetic testing and counseling 02/24/2025  Also, family history of ovarian cancer in sister; ordered genetic testing  By definition, operable breast cancer  For TNBC, cT2, preoperative systemic therapy is preferred  In any case, long-term  outcomes are the same when patients are given chemotherapy preoperatively compared with postoperatively  -experienced significant response to 4 cycles of neoadjuvant chemotherapy  -brief hospitalization with neutropenic fever post cycle 5 of chemotherapy (Keytruda/Adriamycin/Cytoxan)  -no indication of chemotherapy dose reduction at this time  Continue neoadjuvant Keytruda/Adriamycin/Cytoxan every 3 weeks x4 cycles  -check CBC and CMP weekly; check TSH and free T4 every 3 weeks to monitor for the possibility of immune thyroiditis with the Keytruda  -re-stage with breast MRI with and without contrast after completion of 8 cycles of neoadjuvant chemotherapy    -07/10/2025: Occasional dizziness; vertigo x1 episode; brain MRI with contrast rule out brain metastases    -07/10/2025:  Pruritic erythematous rash over both arms and to some extent, over knees; not acutely symptomatic; this is Keytruda induced immune dermatitis; advised to apply calamine local induration or any other soothing lotions; no need to start steroids at this time; if rash worsens, then, we will need to start systemic steroids    Discussion:  Clinical prognostic stage at least IIB, therefore, plan:  preoperative chemotherapy followed by adjuvant chemotherapy:    Preoperative pembrolizumab/carboplatin/Taxol, followed by   preoperative pembrolizumab/Cyclophosphamide/doxorubicin or epirubicin, followed by  adjuvant pembrolizumab (category 1 recommendation)  (see below)  -If residual disease after preoperative therapy with taxane/alkylator/anthracycline based chemotherapy, then, capecitabine (patients in the Giacomo trial did not receive capecitabine, therefore, there are no data on sequencing or to guide selection of one agent over the other, i.e., capecitabine versus olaparib if BRCA1/2 mutation positive)  -If germline BRCA1/2 mutation positive, then, adjuvant olaparib if residual disease after neoadjuvant chemotherapy  (patients in the Giacomo trial  did not receive capecitabine, therefore, there are no data on sequencing or to guide selection of one agent over the other)    Preoperative systemic therapy  Pembrolizumab 200 mg IV day 1  Paclitaxel 80 mg/m² IV days 1, 8, 15  Carboplatin AUC 5 IV day 1  (Cycle every 21 days x 4 cycles) (cycles 1-4)  >>>  Followed by:  -Pembrolizumab 200 mg IV day 1  -Doxorubicin 60 mg/m² IV day 1  -Cyclophosphamide 600 mg/m² IV day 1  (Cycle every 21 days for 4 cycles (cycles 5-8)  Followed by:  Adjuvant:  Pembrolizumab 200 mg IV day 1  Cycle every 21 days x 9 cycles    Response assessment to preoperative systemic therapy:  Physical examination, mammogram, and/or breast ultrasound and/or breast MRI  MRI is more accurate than mammography for assessing tumor response to preoperative therapy    After preoperative systemic therapy, if BCS and axillary staging, then:  Adjuvant systemic therapy +whole breast radiotherapy    After preoperative systemic therapy, if mastectomy and axillary staging, then:  1. Adjuvant systemic therapy +postmastectomy radiotherapy  -if any ypN+: PMRT to chest wall +comprehensive RNI with inclusion of any portion of the undetected axilla at risk  -if cN0, ypN0, then:  If axilla was assessed by SLNB or axillary dissection, then, no PMRT    Adjuvant systemic therapy after preoperative systemic therapy:  -consider adjuvant bisphosphonate therapy for risk reduction of distant metastases for 3-5 years in postmenopausal patients with a high-risk node-negative or node positive tumors; we will consider in this patient's; she will need dental evaluation and preventive Dentistry prior, in order to prevent/minimize likelihood of osteonecrosis of the jaw  1. If ypT0N0 or PCR, then:  Adjuvant pembrolizumab (if pembrolizumab containing regimen was given preoperatively)    2. If ypT1-4, N0 or ypN=/>1, then:  -adjuvant pembrolizumab; and/or  -adjuvant capecitabine (6-8 cycles); and/or  -adjuvant olaparib x1 year if germline  BRCA1/2 mutation positive (category 1 recommendation)    # Anemia due to chemotherapy-induced myelosuppression:  -no severe cytopenias with chemotherapy accept progressive drop in hemoglobin:  13.8 on 04/17/2025, down to 8.9 on 07/03/2025 (secondary to myelosuppression with chemotherapy)  -07/10/2025: WBC 3.7, hemoglobin 9.0, MCV 98.2, platelets normal, differential count normal, ANC 2.38; CMP reviewed; magnesium 1.3 low, potassium 4.5 normal  -07/10/2025:  Labs indicate anemia of chronic disease/functional iron-deficiency  -hemoglobin: 9.4 on 07/24/2025, 8.7 on 07/19/2025, 6.6 on 07/17/2025, 9.0 on 07/10/2025, etc. (MCV normal/slightly elevated)  >>>  Plan:  -labs 07/10/2025:  Anemia of chronic disease/malignancy/functional iron-deficiency  -recheck CBC, serum iron, TIBC, ferritin now; if functional iron-deficiency persists, then, we will proceed with empiric trial of intravenous iron therapy    # Chemotherapy-induced peripheral neuropathy (Taxol):  -for peripheral neuropathy, Cymbalta started 05/29/2025  >>>  -07/10/2025: discontinue gabapentin; resume Cymbalta 60 mg p.o. q.h.s. for neuropathy    #Family history of lung cancer and ovarian cancer:  Father experienced lung cancer at age 84 (was a smoker); sister experienced ovarian cancer at age 54; maternal aunt experienced unknown kind of cancer in her 60s  >>>  -have ordered genetic testing and counseling  -had genetics consultation done on 07/07/2025    #Obesity:  -12/12/2024: BMI 37.2  -02/13/2025: BMI 39.4    History of dyslipidemia, bilateral tinnitus, sensorineural hearing loss, psoriatic arthritis       Follow-up:  No follow-ups on file.  Answers submitted by the patient for this visit:  Review of Systems Questionnaire (Submitted on 7/28/2025)  appetite change : No  unexpected weight change: No  mouth sores: No  visual disturbance: No  cough: No  shortness of breath: No  chest pain: No  abdominal pain: No  diarrhea: Yes  frequency: Yes  back pain: No  rash:  Yes  headaches: No  adenopathy: No  nervous/ anxious: No         [1]   Current Outpatient Medications on File Prior to Visit   Medication Sig Dispense Refill    dexAMETHasone (DECADRON) 4 MG Tab TAKE 2 TABLETS BY MOUTH ONCE DAILY ON DAYS 2, 3, AND 4 FOLLOWING CHEMOTHERAPY 24 tablet 2    gabapentin (NEURONTIN) 100 MG capsule Take 1 capsule (100 mg total) by mouth 3 (three) times daily. 90 each 11    magnesium oxide (MAG-OX) 400 mg (241.3 mg magnesium) tablet Take 1 tablet (400 mg total) by mouth 2 (two) times daily. for 7 days 14 tablet 0    multivitamin (THERAGRAN) per tablet Take 1 tablet by mouth 3 (three) times daily.      OLANZapine (ZYPREXA) 5 MG tablet Take 5 mg by mouth once daily.      omega-3 acid ethyl esters (LOVAZA) 1 gram capsule Take 2 g by mouth 3 (three) times daily.      pantoprazole (PROTONIX) 20 MG tablet Take 1 tablet (20 mg total) by mouth once daily. 30 tablet 1    potassium chloride SA (K-DUR,KLOR-CON) 20 MEQ tablet Take 1 tablet (20 mEq total) by mouth 2 (two) times daily. for 7 days 14 tablet 0    vitamin D (VITAMIN D3) 1000 units Tab Take 1,000 Units by mouth 2 (two) times a day.      DULoxetine (CYMBALTA) 30 MG capsule Take 1 capsule (30 mg total) by mouth once daily for 7 days, THEN 2 capsules (60 mg total) once daily for 23 days. 53 capsule 0    triamcinolone acetonide 0.1% (KENALOG) 0.1 % cream Apply topically 3 (three) times daily. for 10 days 80 g 1     No current facility-administered medications on file prior to visit.

## 2025-07-31 NOTE — Clinical Note
Orders for 07/31/2025:  Genetic testing  Continue chemotherapy  CBC and CMP weekly  TSH and free T4 every 3 weeks  After completion of 8 cycles of chemotherapy, re-stage with breast MRI with and without contrast Brain MRI with contrast is pending Check retic count, serum iron, TIBC, ferritin now Continue Cymbalta 60 mg p.o. q.h.s. for neuropathy Follow-up with NP in 3 weeks

## 2025-08-04 PROBLEM — R21 RASH: Status: RESOLVED | Noted: 2025-06-19 | Resolved: 2025-08-04

## 2025-08-11 ENCOUNTER — HOSPITAL ENCOUNTER (OUTPATIENT)
Dept: RADIOLOGY | Facility: HOSPITAL | Age: 65
Discharge: HOME | End: 2025-08-11
Attending: INTERNAL MEDICINE
Payer: MEDICARE

## 2025-08-11 ENCOUNTER — OFFICE VISIT (OUTPATIENT)
Dept: HEMATOLOGY/ONCOLOGY | Facility: CLINIC | Age: 65
End: 2025-08-11
Payer: MEDICARE

## 2025-08-11 DIAGNOSIS — C50.912 INVASIVE DUCTAL CARCINOMA OF LEFT BREAST: ICD-10-CM

## 2025-08-11 DIAGNOSIS — R42 DIZZINESS AND GIDDINESS: ICD-10-CM

## 2025-08-11 DIAGNOSIS — Z17.421 TRIPLE NEGATIVE BREAST CANCER: ICD-10-CM

## 2025-08-11 DIAGNOSIS — C50.919 TRIPLE NEGATIVE BREAST CANCER: ICD-10-CM

## 2025-08-11 DIAGNOSIS — Z15.89 CHEK2 GENE MUTATION POSITIVE: Primary | ICD-10-CM

## 2025-08-11 PROCEDURE — 70553 MRI BRAIN STEM W/O & W/DYE: CPT | Mod: TC

## 2025-08-11 PROCEDURE — 25500020 PHARM REV CODE 255

## 2025-08-11 PROCEDURE — A9577 INJ MULTIHANCE: HCPCS

## 2025-08-11 RX ADMIN — GADOBENATE DIMEGLUMINE 20 ML: 529 INJECTION, SOLUTION INTRAVENOUS at 02:08

## 2025-08-13 ENCOUNTER — TELEPHONE (OUTPATIENT)
Dept: HEMATOLOGY/ONCOLOGY | Facility: CLINIC | Age: 65
End: 2025-08-13
Payer: MEDICARE

## 2025-08-14 ENCOUNTER — OFFICE VISIT (OUTPATIENT)
Dept: HEMATOLOGY/ONCOLOGY | Facility: CLINIC | Age: 65
End: 2025-08-14
Payer: MEDICARE

## 2025-08-14 ENCOUNTER — LAB VISIT (OUTPATIENT)
Dept: HEMATOLOGY/ONCOLOGY | Facility: CLINIC | Age: 65
End: 2025-08-14
Payer: MEDICARE

## 2025-08-14 ENCOUNTER — OFFICE VISIT (OUTPATIENT)
Dept: SURGERY | Facility: CLINIC | Age: 65
End: 2025-08-14
Attending: INTERNAL MEDICINE
Payer: MEDICARE

## 2025-08-14 VITALS
WEIGHT: 207 LBS | OXYGEN SATURATION: 97 % | HEIGHT: 64 IN | TEMPERATURE: 98 F | HEART RATE: 119 BPM | SYSTOLIC BLOOD PRESSURE: 101 MMHG | BODY MASS INDEX: 35.34 KG/M2 | DIASTOLIC BLOOD PRESSURE: 71 MMHG

## 2025-08-14 DIAGNOSIS — C50.912 INVASIVE DUCTAL CARCINOMA OF LEFT BREAST: Primary | ICD-10-CM

## 2025-08-14 DIAGNOSIS — D64.81 ANEMIA DUE TO CHEMOTHERAPY: ICD-10-CM

## 2025-08-14 DIAGNOSIS — R11.0 NAUSEA: ICD-10-CM

## 2025-08-14 DIAGNOSIS — T45.1X5A CHEMOTHERAPY-INDUCED PERIPHERAL NEUROPATHY: ICD-10-CM

## 2025-08-14 DIAGNOSIS — C50.912 INVASIVE DUCTAL CARCINOMA OF LEFT BREAST: ICD-10-CM

## 2025-08-14 DIAGNOSIS — G62.0 CHEMOTHERAPY-INDUCED PERIPHERAL NEUROPATHY: ICD-10-CM

## 2025-08-14 DIAGNOSIS — Z17.421 TRIPLE NEGATIVE BREAST CANCER: ICD-10-CM

## 2025-08-14 DIAGNOSIS — C50.919 TRIPLE NEGATIVE BREAST CANCER: ICD-10-CM

## 2025-08-14 DIAGNOSIS — R53.1 WEAKNESS: ICD-10-CM

## 2025-08-14 DIAGNOSIS — C50.912 INVASIVE DUCTAL CARCINOMA OF BREAST, FEMALE, LEFT: ICD-10-CM

## 2025-08-14 DIAGNOSIS — T45.1X5A ANEMIA DUE TO CHEMOTHERAPY: ICD-10-CM

## 2025-08-14 DIAGNOSIS — Z51.11 CHEMOTHERAPY MANAGEMENT, ENCOUNTER FOR: ICD-10-CM

## 2025-08-14 DIAGNOSIS — E87.6 HYPOKALEMIA: ICD-10-CM

## 2025-08-14 LAB
ALBUMIN SERPL-MCNC: 3.1 G/DL (ref 3.4–4.8)
ALBUMIN/GLOB SERPL: 0.7 RATIO (ref 1.1–2)
ALP SERPL-CCNC: 241 UNIT/L (ref 40–150)
ALT SERPL-CCNC: 60 UNIT/L (ref 0–55)
ANION GAP SERPL CALC-SCNC: 10 MEQ/L
AST SERPL-CCNC: 84 UNIT/L (ref 11–45)
BASOPHILS # BLD AUTO: 0.06 X10(3)/MCL
BASOPHILS NFR BLD AUTO: 1 %
BILIRUB SERPL-MCNC: 1 MG/DL
BUN SERPL-MCNC: 9 MG/DL (ref 9.8–20.1)
CALCIUM SERPL-MCNC: 9.1 MG/DL (ref 8.4–10.2)
CHLORIDE SERPL-SCNC: 102 MMOL/L (ref 98–107)
CO2 SERPL-SCNC: 25 MMOL/L (ref 23–31)
CREAT SERPL-MCNC: 1.01 MG/DL (ref 0.55–1.02)
CREAT/UREA NIT SERPL: 9
EOSINOPHIL # BLD AUTO: 0.39 X10(3)/MCL (ref 0–0.9)
EOSINOPHIL NFR BLD AUTO: 6.2 %
ERYTHROCYTE [DISTWIDTH] IN BLOOD BY AUTOMATED COUNT: 16.7 % (ref 11.5–17)
GFR SERPLBLD CREATININE-BSD FMLA CKD-EPI: >60 ML/MIN/1.73/M2
GLOBULIN SER-MCNC: 4.2 GM/DL (ref 2.4–3.5)
GLUCOSE SERPL-MCNC: 143 MG/DL (ref 82–115)
HCT VFR BLD AUTO: 33.7 % (ref 37–47)
HGB BLD-MCNC: 10.4 G/DL (ref 12–16)
IMM GRANULOCYTES # BLD AUTO: 0.02 X10(3)/MCL (ref 0–0.04)
IMM GRANULOCYTES NFR BLD AUTO: 0.3 %
LYMPHOCYTES # BLD AUTO: 0.78 X10(3)/MCL (ref 0.6–4.6)
LYMPHOCYTES NFR BLD AUTO: 12.4 %
MAGNESIUM SERPL-MCNC: 2.2 MG/DL (ref 1.6–2.6)
MCH RBC QN AUTO: 30 PG (ref 27–31)
MCHC RBC AUTO-ENTMCNC: 30.9 G/DL (ref 33–36)
MCV RBC AUTO: 97.1 FL (ref 80–94)
MONOCYTES # BLD AUTO: 0.46 X10(3)/MCL (ref 0.1–1.3)
MONOCYTES NFR BLD AUTO: 7.3 %
NEUTROPHILS # BLD AUTO: 4.56 X10(3)/MCL (ref 2.1–9.2)
NEUTROPHILS NFR BLD AUTO: 72.8 %
NRBC BLD AUTO-RTO: 0 %
PLATELET # BLD AUTO: 265 X10(3)/MCL (ref 130–400)
PMV BLD AUTO: 10 FL (ref 7.4–10.4)
POTASSIUM SERPL-SCNC: 3.3 MMOL/L (ref 3.5–5.1)
PROT SERPL-MCNC: 7.3 GM/DL (ref 5.8–7.6)
RBC # BLD AUTO: 3.47 X10(6)/MCL (ref 4.2–5.4)
SODIUM SERPL-SCNC: 137 MMOL/L (ref 136–145)
WBC # BLD AUTO: 6.27 X10(3)/MCL (ref 4.5–11.5)

## 2025-08-14 PROCEDURE — 85025 COMPLETE CBC W/AUTO DIFF WBC: CPT

## 2025-08-14 PROCEDURE — 99215 OFFICE O/P EST HI 40 MIN: CPT | Mod: PBBFAC,27

## 2025-08-14 PROCEDURE — 83735 ASSAY OF MAGNESIUM: CPT

## 2025-08-14 PROCEDURE — 80053 COMPREHEN METABOLIC PANEL: CPT

## 2025-08-14 PROCEDURE — 99215 OFFICE O/P EST HI 40 MIN: CPT | Mod: PBBFAC

## 2025-08-14 RX ORDER — POTASSIUM CHLORIDE 20 MEQ/1
20 TABLET, EXTENDED RELEASE ORAL DAILY
Qty: 30 TABLET | Refills: 11 | Status: SHIPPED | OUTPATIENT
Start: 2025-08-14 | End: 2026-08-14

## 2025-08-14 RX ORDER — PROCHLORPERAZINE MALEATE 10 MG
10 TABLET ORAL 4 TIMES DAILY PRN
Qty: 30 TABLET | Refills: 1 | Status: SHIPPED | OUTPATIENT
Start: 2025-08-14 | End: 2025-10-13

## 2025-08-14 RX ORDER — ONDANSETRON 8 MG/1
8 TABLET, FILM COATED ORAL EVERY 8 HOURS PRN
Qty: 30 TABLET | Refills: 3 | Status: SHIPPED | OUTPATIENT
Start: 2025-08-14 | End: 2025-09-23

## 2025-08-21 ENCOUNTER — TELEPHONE (OUTPATIENT)
Dept: HEMATOLOGY/ONCOLOGY | Facility: CLINIC | Age: 65
End: 2025-08-21
Payer: MEDICARE

## 2025-08-21 RX ORDER — OLANZAPINE 5 MG/1
5 TABLET, FILM COATED ORAL DAILY
OUTPATIENT
Start: 2025-08-21

## 2025-08-21 RX ORDER — OMEGA-3-ACID ETHYL ESTERS 1 G/1
2 CAPSULE, LIQUID FILLED ORAL 3 TIMES DAILY
Qty: 540 CAPSULE | OUTPATIENT
Start: 2025-08-21

## 2025-08-25 ENCOUNTER — TELEPHONE (OUTPATIENT)
Dept: NEUROLOGY | Facility: CLINIC | Age: 65
End: 2025-08-25
Payer: MEDICARE

## 2025-08-25 PROBLEM — D84.821 IMMUNOSUPPRESSED DUE TO CHEMOTHERAPY: Status: RESOLVED | Noted: 2025-07-10 | Resolved: 2025-08-25

## 2025-08-25 PROBLEM — T45.1X5A IMMUNOSUPPRESSED DUE TO CHEMOTHERAPY: Status: RESOLVED | Noted: 2025-07-10 | Resolved: 2025-08-25

## 2025-08-25 PROBLEM — Z79.69 IMMUNOSUPPRESSED DUE TO CHEMOTHERAPY: Status: RESOLVED | Noted: 2025-07-10 | Resolved: 2025-08-25

## 2025-08-25 PROBLEM — T45.1X5A ANEMIA DUE TO CHEMOTHERAPY: Status: RESOLVED | Noted: 2025-07-09 | Resolved: 2025-08-25

## 2025-08-25 PROBLEM — R42 VERTIGO: Status: RESOLVED | Noted: 2025-07-10 | Resolved: 2025-08-25

## 2025-08-25 PROBLEM — D64.81 ANEMIA DUE TO CHEMOTHERAPY: Status: RESOLVED | Noted: 2025-07-09 | Resolved: 2025-08-25

## 2025-08-28 ENCOUNTER — LAB VISIT (OUTPATIENT)
Dept: HEMATOLOGY/ONCOLOGY | Facility: CLINIC | Age: 65
End: 2025-08-28
Payer: MEDICARE

## 2025-08-28 DIAGNOSIS — D64.81 ANEMIA DUE TO CHEMOTHERAPY: ICD-10-CM

## 2025-08-28 DIAGNOSIS — T45.1X5A ANEMIA DUE TO CHEMOTHERAPY: ICD-10-CM

## 2025-08-28 DIAGNOSIS — T45.1X5A CHEMOTHERAPY-INDUCED PERIPHERAL NEUROPATHY: ICD-10-CM

## 2025-08-28 DIAGNOSIS — G62.0 CHEMOTHERAPY-INDUCED PERIPHERAL NEUROPATHY: ICD-10-CM

## 2025-08-28 DIAGNOSIS — Z51.11 CHEMOTHERAPY MANAGEMENT, ENCOUNTER FOR: ICD-10-CM

## 2025-08-28 LAB
ALBUMIN SERPL-MCNC: 3 G/DL (ref 3.4–4.8)
ALBUMIN/GLOB SERPL: 0.8 RATIO (ref 1.1–2)
ALP SERPL-CCNC: 136 UNIT/L (ref 40–150)
ALT SERPL-CCNC: 28 UNIT/L (ref 0–55)
ANION GAP SERPL CALC-SCNC: 6 MEQ/L
AST SERPL-CCNC: 34 UNIT/L (ref 11–45)
BILIRUB SERPL-MCNC: 0.4 MG/DL
BUN SERPL-MCNC: 9.3 MG/DL (ref 9.8–20.1)
CALCIUM SERPL-MCNC: 9.1 MG/DL (ref 8.4–10.2)
CHLORIDE SERPL-SCNC: 105 MMOL/L (ref 98–107)
CO2 SERPL-SCNC: 26 MMOL/L (ref 23–31)
CREAT SERPL-MCNC: 0.85 MG/DL (ref 0.55–1.02)
CREAT/UREA NIT SERPL: 11
GFR SERPLBLD CREATININE-BSD FMLA CKD-EPI: >60 ML/MIN/1.73/M2
GLOBULIN SER-MCNC: 4 GM/DL (ref 2.4–3.5)
GLUCOSE SERPL-MCNC: 109 MG/DL (ref 82–115)
POTASSIUM SERPL-SCNC: 4.4 MMOL/L (ref 3.5–5.1)
PROT SERPL-MCNC: 7 GM/DL (ref 5.8–7.6)
SODIUM SERPL-SCNC: 137 MMOL/L (ref 136–145)

## 2025-08-28 PROCEDURE — 80053 COMPREHEN METABOLIC PANEL: CPT

## 2025-08-29 ENCOUNTER — CLINICAL SUPPORT (OUTPATIENT)
Dept: OPHTHALMOLOGY | Facility: CLINIC | Age: 65
End: 2025-08-29
Payer: MEDICARE

## 2025-08-29 DIAGNOSIS — H52.4 MYOPIA OF BOTH EYES WITH ASTIGMATISM AND PRESBYOPIA: Primary | ICD-10-CM

## 2025-08-29 DIAGNOSIS — H52.203 MYOPIA OF BOTH EYES WITH ASTIGMATISM AND PRESBYOPIA: Primary | ICD-10-CM

## 2025-08-29 DIAGNOSIS — H52.13 MYOPIA OF BOTH EYES WITH ASTIGMATISM AND PRESBYOPIA: Primary | ICD-10-CM

## 2025-08-29 PROCEDURE — 99211 OFF/OP EST MAY X REQ PHY/QHP: CPT | Mod: PBBFAC,PN

## 2025-08-31 PROBLEM — E66.812 CLASS 2 OBESITY IN ADULT: Status: ACTIVE | Noted: 2025-08-31

## 2025-08-31 PROBLEM — Z15.89 CHEK2 GENE MUTATION POSITIVE: Status: ACTIVE | Noted: 2025-08-31

## 2025-08-31 PROBLEM — D63.8 ANEMIA OF CHRONIC DISEASE: Status: ACTIVE | Noted: 2025-08-31

## 2025-09-01 PROBLEM — Z79.69 IMMUNODEFICIENCY DUE TO CHEMOTHERAPY: Status: RESOLVED | Noted: 2025-04-24 | Resolved: 2025-09-01

## 2025-09-01 PROBLEM — T45.1X5A CHEMOTHERAPY-INDUCED PERIPHERAL NEUROPATHY: Status: RESOLVED | Noted: 2025-05-29 | Resolved: 2025-09-01

## 2025-09-01 PROBLEM — D84.821 IMMUNODEFICIENCY DUE TO CHEMOTHERAPY: Status: RESOLVED | Noted: 2025-04-24 | Resolved: 2025-09-01

## 2025-09-01 PROBLEM — T45.1X5A IMMUNODEFICIENCY DUE TO CHEMOTHERAPY: Status: RESOLVED | Noted: 2025-04-24 | Resolved: 2025-09-01

## 2025-09-01 PROBLEM — D70.9 NEUTROPENIC FEVER: Status: RESOLVED | Noted: 2025-07-17 | Resolved: 2025-09-01

## 2025-09-01 PROBLEM — G62.0 CHEMOTHERAPY-INDUCED PERIPHERAL NEUROPATHY: Status: RESOLVED | Noted: 2025-05-29 | Resolved: 2025-09-01

## 2025-09-01 PROBLEM — R50.81 NEUTROPENIC FEVER: Status: RESOLVED | Noted: 2025-07-17 | Resolved: 2025-09-01

## 2025-09-02 ENCOUNTER — OFFICE VISIT (OUTPATIENT)
Dept: SURGERY | Facility: CLINIC | Age: 65
End: 2025-09-02
Payer: MEDICARE

## 2025-09-02 VITALS
HEART RATE: 121 BPM | BODY MASS INDEX: 34.49 KG/M2 | OXYGEN SATURATION: 93 % | HEIGHT: 64 IN | TEMPERATURE: 98 F | WEIGHT: 202 LBS | SYSTOLIC BLOOD PRESSURE: 81 MMHG | DIASTOLIC BLOOD PRESSURE: 60 MMHG

## 2025-09-02 DIAGNOSIS — Z17.421 TRIPLE NEGATIVE BREAST CANCER: Primary | ICD-10-CM

## 2025-09-02 DIAGNOSIS — C50.919 TRIPLE NEGATIVE BREAST CANCER: Primary | ICD-10-CM

## 2025-09-02 PROBLEM — E87.8 ELECTROLYTE ABNORMALITY: Status: ACTIVE | Noted: 2025-09-02

## 2025-09-02 PROCEDURE — 99999 PR PBB SHADOW E&M-EST. PATIENT-LVL III: CPT | Mod: PBBFAC,,, | Performed by: SURGERY

## 2025-09-02 PROCEDURE — 99213 OFFICE O/P EST LOW 20 MIN: CPT | Mod: PBBFAC | Performed by: SURGERY

## (undated) DEVICE — GOWN POLY REINF BRTH SLV XL

## (undated) DEVICE — PAD ELECTROSURGICAL SPL W/CORD

## (undated) DEVICE — KIT SURGICAL TURNOVER

## (undated) DEVICE — SYR DISP LL 5CC

## (undated) DEVICE — APPLICATOR CHLORAPREP ORN 26ML

## (undated) DEVICE — SUT MCRYL PLUS 4-0 PS2 27IN

## (undated) DEVICE — BLADE SURG STAINLESS STEEL #11

## (undated) DEVICE — DECANTER FLUID TRNSF WHITE 9IN

## (undated) DEVICE — SUT VICRYL 3-0 27 SH

## (undated) DEVICE — GLOVE SENSICARE PI GRN 8.5

## (undated) DEVICE — GLOVE SIGNATURE MICRO LTX 7

## (undated) DEVICE — SYR 10CC LUER LOCK

## (undated) DEVICE — SUT PROLENE 2-0 SH 36IN BLU

## (undated) DEVICE — CONTAINER SPECIMEN OR STER 4OZ

## (undated) DEVICE — COVER SITE-RITE PROBE 96IN

## (undated) DEVICE — GEL AQUASONIC 100 STERILE20GM

## (undated) DEVICE — ADHESIVE DERMABOND ADVANCED

## (undated) DEVICE — GLOVE SENSICARE PI GRN 7

## (undated) DEVICE — NDL HYPO REG 25G X 1 1/2

## (undated) DEVICE — DRAPE C-ARM COVER EZ 36X28IN

## (undated) DEVICE — GLOVE SIGNATURE MICRO LTX 8

## (undated) DEVICE — Device